# Patient Record
Sex: MALE | Race: WHITE | NOT HISPANIC OR LATINO | Employment: FULL TIME | URBAN - METROPOLITAN AREA
[De-identification: names, ages, dates, MRNs, and addresses within clinical notes are randomized per-mention and may not be internally consistent; named-entity substitution may affect disease eponyms.]

---

## 2017-07-01 ENCOUNTER — HOSPITAL ENCOUNTER (EMERGENCY)
Facility: HOSPITAL | Age: 42
Discharge: HOME | End: 2017-07-01
Attending: EMERGENCY MEDICINE | Admitting: EMERGENCY MEDICINE
Payer: COMMERCIAL

## 2017-07-01 VITALS
WEIGHT: 247.6 LBS | SYSTOLIC BLOOD PRESSURE: 155 MMHG | OXYGEN SATURATION: 97 % | BODY MASS INDEX: 33.54 KG/M2 | HEART RATE: 74 BPM | DIASTOLIC BLOOD PRESSURE: 92 MMHG | RESPIRATION RATE: 18 BRPM | TEMPERATURE: 97.8 F | HEIGHT: 72 IN

## 2017-07-01 DIAGNOSIS — M43.6 TORTICOLLIS: Primary | ICD-10-CM

## 2017-07-01 PROCEDURE — 99283 EMERGENCY DEPT VISIT LOW MDM: CPT

## 2017-07-01 RX ORDER — NAPROXEN 500 MG/1
500 TABLET ORAL 2 TIMES DAILY WITH MEALS
Qty: 20 TABLET | Refills: 0 | Status: SHIPPED | OUTPATIENT
Start: 2017-07-01 | End: 2019-09-11

## 2017-07-01 RX ORDER — DIAZEPAM 5 MG/1
5 TABLET ORAL EVERY 6 HOURS PRN
Qty: 20 TABLET | Refills: 0 | Status: SHIPPED | OUTPATIENT
Start: 2017-07-01 | End: 2019-09-11

## 2019-03-13 ENCOUNTER — HOSPITAL ENCOUNTER (EMERGENCY)
Facility: HOSPITAL | Age: 44
Discharge: HOME | End: 2019-03-13
Attending: EMERGENCY MEDICINE | Admitting: EMERGENCY MEDICINE
Payer: COMMERCIAL

## 2019-03-13 ENCOUNTER — APPOINTMENT (EMERGENCY)
Dept: RADIOLOGY | Facility: HOSPITAL | Age: 44
End: 2019-03-13
Payer: COMMERCIAL

## 2019-03-13 VITALS
BODY MASS INDEX: 33.22 KG/M2 | HEART RATE: 75 BPM | RESPIRATION RATE: 16 BRPM | OXYGEN SATURATION: 98 % | WEIGHT: 244.93 LBS | TEMPERATURE: 98 F | DIASTOLIC BLOOD PRESSURE: 102 MMHG | SYSTOLIC BLOOD PRESSURE: 142 MMHG

## 2019-03-13 DIAGNOSIS — M25.511 RIGHT SHOULDER PAIN: Primary | ICD-10-CM

## 2019-03-13 PROCEDURE — 99283 EMERGENCY DEPT VISIT LOW MDM: CPT

## 2019-03-13 PROCEDURE — 73030 X-RAY EXAM OF SHOULDER: CPT

## 2019-03-13 RX ORDER — NAPROXEN 250 MG/1
500 TABLET ORAL ONCE
Status: COMPLETED | OUTPATIENT
Start: 2019-03-13 | End: 2019-03-13

## 2019-03-13 RX ORDER — DIAZEPAM 5 MG/1
5 TABLET ORAL EVERY 12 HOURS PRN
Qty: 12 TABLET | Refills: 0 | Status: SHIPPED | OUTPATIENT
Start: 2019-03-13 | End: 2019-04-30

## 2019-03-13 RX ORDER — NAPROXEN 500 MG/1
500 TABLET ORAL 2 TIMES DAILY WITH MEALS
Qty: 20 TABLET | Refills: 0 | Status: SHIPPED | OUTPATIENT
Start: 2019-03-13 | End: 2019-04-30

## 2019-03-13 RX ORDER — PREDNISONE 20 MG/1
40 TABLET ORAL DAILY
Qty: 10 TABLET | Refills: 0 | Status: SHIPPED | OUTPATIENT
Start: 2019-03-13 | End: 2019-03-18

## 2019-03-13 RX ORDER — PREDNISONE 20 MG/1
40 TABLET ORAL ONCE
Status: COMPLETED | OUTPATIENT
Start: 2019-03-13 | End: 2019-03-13

## 2019-03-13 RX ADMIN — PREDNISONE 40 MG: 20 TABLET ORAL at 21:49

## 2019-03-13 RX ADMIN — NAPROXEN 500 MG: 250 TABLET ORAL at 21:49

## 2019-03-14 NOTE — DISCHARGE INSTRUCTIONS
Please return if you worsening or other concerning symptoms otherwise follow up with Orthopedics for re-evaluation as instructed as discussed

## 2019-03-14 NOTE — ED PROVIDER NOTES
History  Chief Complaint   Patient presents with    Shoulder Pain     Patient complaining of pain in right shoulder  Patient reports that he notices it more at night and with certain movements  26-year-old male right-hand dominant out past medical history here for evaluation of right shoulder pain  The patient states that for least the last 6 months without definite inciting incident or injury he has had pain in his right anterior lateral shoulder, it has been persistent since that time although getting progressively worse now to the point where he has difficulty sleeping its most severe in the morning  It is localized to the right anterior lateral shoulder it is worse with lying on that side movement specifically trying to lift his arm above his head and reach out words is better held in a position of comfort not moving it he has not taken anything for this she denies radiation into his chest neck or back or down his arm he has no exertional symptoms he has no weakness paresthesias or anesthesia no fevers chills warmth swelling other joint involved or other associated symptoms he has never previously been evaluated for it  No definite inciting factors or precipitating event  Complete review systems otherwise negative as noted          Prior to Admission Medications   Prescriptions Last Dose Informant Patient Reported? Taking?   diazepam (VALIUM) 5 mg tablet   No No   Sig: Take 1 tablet by mouth every 6 (six) hours as needed for muscle spasms   naproxen (NAPROSYN) 500 mg tablet   No No   Sig: Take 1 tablet by mouth 2 (two) times a day with meals Prn pain      Facility-Administered Medications: None       Past Medical History:   Diagnosis Date    GERD (gastroesophageal reflux disease)     Hypertension        Past Surgical History:   Procedure Laterality Date    KNEE SURGERY      torn ligament        History reviewed  No pertinent family history    I have reviewed and agree with the history as documented  Social History     Tobacco Use    Smoking status: Former Smoker    Smokeless tobacco: Never Used    Tobacco comment: quit almost 3 years ago  Substance Use Topics    Alcohol use: Yes     Comment: occasional    Drug use: No        Review of Systems   Constitutional: Negative for activity change, appetite change, chills and fever  Respiratory: Negative for cough and shortness of breath  Cardiovascular: Negative for chest pain and palpitations  Gastrointestinal: Negative for abdominal pain, nausea and vomiting  Musculoskeletal: Negative for arthralgias, back pain, joint swelling, myalgias, neck pain and neck stiffness  Right shoulder pain   Skin: Negative for color change, rash and wound  Neurological: Negative for dizziness, weakness, light-headedness, numbness and headaches  Hematological: Negative for adenopathy  Does not bruise/bleed easily  Psychiatric/Behavioral: Negative for agitation and behavioral problems  All other systems reviewed and are negative  Physical Exam  Physical Exam   Constitutional: He is oriented to person, place, and time  He appears well-developed and well-nourished  No distress  Very well pleasant no acute distress   HENT:   Head: Normocephalic and atraumatic  Eyes: Pupils are equal, round, and reactive to light  EOM are normal    Neck: Normal range of motion  Neck supple  No tracheal deviation present  No midline cervical or thoracic tenderness   Cardiovascular: Normal rate, regular rhythm and normal heart sounds  Exam reveals no gallop and no friction rub  No murmur heard  Pulmonary/Chest: Effort normal and breath sounds normal  He has no wheezes  He has no rales  Abdominal: Soft  Bowel sounds are normal  He exhibits no distension  There is no tenderness  There is no rebound and no guarding     Musculoskeletal:   Patient localizes discomfort to his right anterior lateral shoulder he is mildly tender in this area but there is no discrete bony tenderness of the scapula clavicle AC joint humeral head or proximal humerus he has no bony tenderness of the elbow forearm hand or wrist distal arms neurovascularly intact globally including ain PI and wrist flexion and extension he has 2+ symmetric pulses sensations intact in all 3 distributions or hands warm and well perfused and muscle strength is 5/5 globally distally there is no overlying warmth swelling or erythema forced external rotation and abduction reproduces symptoms exactly he has difficulty abducting his arm secondary to the pain   Neurological: He is alert and oriented to person, place, and time  No cranial nerve deficit  He exhibits normal muscle tone  Coordination normal    Skin: Skin is warm and dry  No rash noted  Psychiatric: He has a normal mood and affect  His behavior is normal    Nursing note and vitals reviewed        Vital Signs  ED Triage Vitals [03/13/19 1939]   Temperature Pulse Respirations Blood Pressure SpO2   98 °F (36 7 °C) 65 18 135/88 99 %      Temp Source Heart Rate Source Patient Position - Orthostatic VS BP Location FiO2 (%)   Oral Monitor Sitting Left arm --      Pain Score       9           Vitals:    03/13/19 1939 03/13/19 2226   BP: 135/88 (!) 142/102   Pulse: 65 75   Patient Position - Orthostatic VS: Sitting Sitting       qSOFA     Row Name 03/13/19 2226 03/13/19 2127 03/13/19 1939          Altered mental status GCS < 15  --  0  --      Respiratory Rate > / =22  0  --  0      Systolic BP < / =116  0  --  0      Q Sofa Score  0  0  0            Visual Acuity      ED Medications  Medications   naproxen (NAPROSYN) tablet 500 mg (500 mg Oral Given 3/13/19 2149)   predniSONE tablet 40 mg (40 mg Oral Given 3/13/19 2149)       Diagnostic Studies  Results Reviewed     None                 XR shoulder 2+ views RIGHT   ED Interpretation by Kasi Panchal DO (03/13 2229)   No acute abnormality      Final Result by Amy Brock MD (03/14 1004)      No acute osseous abnormality  Workstation performed: [id number]                    Procedures  Procedures       Phone Contacts  ED Phone Contact    ED Course                               MDM  Number of Diagnoses or Management Options  Right shoulder pain:   Diagnosis management comments: 26-year-old male right-hand dominant out past medical history with 6 months of atraumatic right shoulder pain progressive difficulty sleeping on that side does not radiate into the neck back chest or down his arm it is not exertional is strictly present with position changes and particularly external rotation and abduction comfortable in position of comfort no weakness paresthesias or anesthesia on exam here he is afebrile normal vital signs clinically well-appearing his exam is otherwise unremarkable x-ray reviewed without pathologic bony lesion calcific tendinitis significant arthritis or other acute findings history and physical exam is most consistent with rotator cuff tendinitis/pathology, although will treat symptomatically, refer Sports Medicine/Orthopedics with appropriate return precautions, patient agreeable plan      Disposition  Final diagnoses:   Right shoulder pain     Time reflects when diagnosis was documented in both MDM as applicable and the Disposition within this note     Time User Action Codes Description Comment    3/13/2019 10:29 PM Adrian Peralta Add [[id number] [id number]] Right shoulder pain       ED Disposition     ED Disposition Condition Date/Time Comment    Discharge Stable Wed Mar 13, 2019 10:29 PM [facility] discharge to home/self care              Follow-up Information     Follow up With Specialties Details Why Contact Info Additional Information    [facility] Emergency Department Emergency Medicine  If symptoms worsen [facility] [address] ED, [address] [place], [place], [address] [place] [address], [address] [place] [address] In 1 week  [address] [facility]  [phone]       [facility] Orthopedic Surgery In 1 week  [address] [place] [facility] [place], [address] [address] [place], [place], [address]          Discharge Medication List as of 3/13/2019 10:46 PM      START taking these medications    Details   !! diazepam (VALIUM) 5 mg tablet Take 1 tablet (5 mg total) by mouth every 12 (twelve) hours as needed for sleep or muscle spasms for up to 6 days, Starting Wed 3/13/2019, Until Tue 3/19/2019, Print      !! naproxen (NAPROSYN) 500 mg tablet Take 1 tablet (500 mg total) by mouth 2 (two) times a day with meals for 10 days, Starting Wed 3/13/2019, Until Sat 3/23/2019, Print      predniSONE 20 mg tablet Take 2 tablets (40 mg total) by mouth daily for 5 days, Starting Wed 3/13/2019, Until Mon 3/18/2019, Print       !! - Potential duplicate medications found  Please discuss with provider  CONTINUE these medications which have NOT CHANGED    Details   !! diazepam (VALIUM) 5 mg tablet Take 1 tablet by mouth every 6 (six) hours as needed for muscle spasms, Starting Sat 7/1/2017, Print      !! naproxen (NAPROSYN) 500 mg tablet Take 1 tablet by mouth 2 (two) times a day with meals Prn pain, Starting Sat 7/1/2017, Print       !! - Potential duplicate medications found  Please discuss with provider  No discharge procedures on file      ED Provider  Electronically Signed by           Kasi Panchal DO  03/15/19 0558

## 2019-04-30 VITALS
BODY MASS INDEX: 33.1 KG/M2 | WEIGHT: 244.4 LBS | HEART RATE: 80 BPM | HEIGHT: 72 IN | DIASTOLIC BLOOD PRESSURE: 82 MMHG | SYSTOLIC BLOOD PRESSURE: 127 MMHG

## 2019-04-30 DIAGNOSIS — M54.12 CERVICAL RADICULOPATHY AT C7: Primary | ICD-10-CM

## 2019-04-30 PROCEDURE — 99203 OFFICE O/P NEW LOW 30 MIN: CPT | Performed by: ORTHOPAEDIC SURGERY

## 2019-04-30 RX ORDER — OMEPRAZOLE 40 MG/1
40 CAPSULE, DELAYED RELEASE ORAL DAILY
COMMUNITY
Start: 2019-03-13

## 2019-04-30 RX ORDER — MELOXICAM 15 MG/1
15 TABLET ORAL DAILY
Qty: 30 TABLET | Refills: 0 | Status: SHIPPED | OUTPATIENT
Start: 2019-04-30 | End: 2019-09-11

## 2019-05-15 ENCOUNTER — HOSPITAL ENCOUNTER (OUTPATIENT)
Dept: MRI IMAGING | Facility: HOSPITAL | Age: 44
Discharge: HOME | End: 2019-05-15
Attending: ORTHOPAEDIC SURGERY
Payer: COMMERCIAL

## 2019-05-15 DIAGNOSIS — M54.12 CERVICAL RADICULOPATHY AT C7: ICD-10-CM

## 2019-05-15 PROCEDURE — 72141 MRI NECK SPINE W/O DYE: CPT

## 2019-05-29 ENCOUNTER — OFFICE VISIT (OUTPATIENT)
Dept: OBGYN CLINIC | Facility: CLINIC | Age: 44
End: 2019-05-29
Payer: COMMERCIAL

## 2019-05-29 VITALS
WEIGHT: 248.6 LBS | SYSTOLIC BLOOD PRESSURE: 139 MMHG | HEIGHT: 72 IN | HEART RATE: 82 BPM | BODY MASS INDEX: 33.67 KG/M2 | DIASTOLIC BLOOD PRESSURE: 81 MMHG

## 2019-05-29 DIAGNOSIS — M77.8 TENDINITIS OF RIGHT SHOULDER: Primary | ICD-10-CM

## 2019-05-29 PROCEDURE — 99213 OFFICE O/P EST LOW 20 MIN: CPT | Performed by: ORTHOPAEDIC SURGERY

## 2019-05-29 PROCEDURE — 20610 DRAIN/INJ JOINT/BURSA W/O US: CPT | Performed by: ORTHOPAEDIC SURGERY

## 2019-05-29 RX ADMIN — BUPIVACAINE HYDROCHLORIDE 2.5 ML: 7.5 INJECTION, SOLUTION EPIDURAL; RETROBULBAR at 18:15

## 2019-05-29 RX ADMIN — LIDOCAINE HYDROCHLORIDE 2 ML: 10 INJECTION, SOLUTION INFILTRATION; PERINEURAL at 18:15

## 2019-05-30 RX ORDER — BUPIVACAINE HYDROCHLORIDE 7.5 MG/ML
2.5 INJECTION, SOLUTION EPIDURAL; RETROBULBAR
Status: COMPLETED | OUTPATIENT
Start: 2019-05-29 | End: 2019-05-29

## 2019-05-30 RX ORDER — LIDOCAINE HYDROCHLORIDE 10 MG/ML
2 INJECTION, SOLUTION INFILTRATION; PERINEURAL
Status: COMPLETED | OUTPATIENT
Start: 2019-05-29 | End: 2019-05-29

## 2019-07-24 ENCOUNTER — OFFICE VISIT (OUTPATIENT)
Dept: OBGYN CLINIC | Facility: CLINIC | Age: 44
End: 2019-07-24
Payer: COMMERCIAL

## 2019-07-24 VITALS
HEART RATE: 69 BPM | DIASTOLIC BLOOD PRESSURE: 90 MMHG | WEIGHT: 247.6 LBS | HEIGHT: 72 IN | BODY MASS INDEX: 33.54 KG/M2 | SYSTOLIC BLOOD PRESSURE: 145 MMHG

## 2019-07-24 DIAGNOSIS — M77.8 TENDINITIS OF RIGHT SHOULDER: Primary | ICD-10-CM

## 2019-07-24 PROCEDURE — 99213 OFFICE O/P EST LOW 20 MIN: CPT | Performed by: PHYSICIAN ASSISTANT

## 2019-07-24 NOTE — PROGRESS NOTES
Chief Complaint   Patient presents with    Right Shoulder - Follow-up         Subjective   Patient here for follow-up right shoulder  Patient had cortisone injection last visit which lasted for about 48 hr   Pain returned to the right shoulder  Patient has been doing home exercise program with the   Shoulder feels better while doing the exercises but soon after pain returned  He has full range of motion but has difficulty with overhead activities  Pain with lifting items  Also the pain is over the anterior and posterior aspect of the shoulder  ROS:  Review of systems form reviewed July 24, 2019   General: no fever, no chills  Respiratory:  No coughing, shortness of breath or wheezing  Cardiovascular:  No chest pain, no palpitations  Musculoskeletal: see HPI and PE  SKIN:  No skin rash, no dry skin  Neurological:  No headaches, no confusion  Psychiatric:  No suicide thoughts, no anxiety, no depression  Review of all other systems is negative    Past Medical History:   Diagnosis Date    GERD (gastroesophageal reflux disease)     Hypertension        Current Outpatient Medications on File Prior to Visit   Medication Sig Dispense Refill    diazepam (VALIUM) 5 mg tablet Take 1 tablet by mouth every 6 (six) hours as needed for muscle spasms 20 tablet 0    meloxicam (MOBIC) 15 mg tablet Take 1 tablet (15 mg total) by mouth daily 30 tablet 0    naproxen (NAPROSYN) 500 mg tablet Take 1 tablet by mouth 2 (two) times a day with meals Prn pain 20 tablet 0    omeprazole (PRILOSEC) 40 MG capsule Take 40 mg by mouth daily       No current facility-administered medications on file prior to visit          No Known Allergies      Physical Exam:    Vitals:    07/24/19 1653   BP: 145/90   Pulse: 69   Weight: 112 kg (247 lb 9 6 oz)   Height: 6' (1 829 m)       General Appearance:  Alert, cooperative, no distress, appears stated age   Lungs:   respirations unlabored   Heart:  Normal heart rate noted   Abdomen: Soft, non-tender,  no masses   Extremities: Extremities normal, atraumatic, no cyanosis or edema   Pulses: 2+ and symmetric   Skin: Skin color, texture, turgor normal, no rashes or lesions   Neurologic: Normal         Ortho Exam  Examination of right shoulder skin intact with no visible deformities  Patient has full active range of motion all planes  Pain with resisted elevation  Positive empty can, positive John impingement sign  Negative speed's, negative drop-arm  Sensation is intact light touch    ASSESSMENT:    Hemantor Stephan was seen today for follow-up  Diagnoses and all orders for this visit:    Tendinitis of right shoulder  -     MRI shoulder right wo contrast; Future          PLAN:  Patient with clinical signs of right shoulder supraspinatus tear  He has failed conservative treatment of therapy, medications and injections  We will get MRI of the right shoulder to rule out rotator cuff tear  Patient will follow up after MRI to discuss MRI findings and treatment options based on those findings    MRI necessary for possible surgical planning

## 2019-08-01 ENCOUNTER — HOSPITAL ENCOUNTER (OUTPATIENT)
Dept: MRI IMAGING | Facility: HOSPITAL | Age: 44
Discharge: HOME | End: 2019-08-01
Payer: COMMERCIAL

## 2019-08-01 DIAGNOSIS — M77.8 TENDINITIS OF RIGHT SHOULDER: ICD-10-CM

## 2019-08-01 PROCEDURE — 73221 MRI JOINT UPR EXTREM W/O DYE: CPT

## 2019-09-11 ENCOUNTER — OFFICE VISIT (OUTPATIENT)
Dept: OBGYN CLINIC | Facility: CLINIC | Age: 44
End: 2019-09-11
Payer: COMMERCIAL

## 2019-09-11 VITALS
SYSTOLIC BLOOD PRESSURE: 127 MMHG | HEART RATE: 68 BPM | WEIGHT: 242.4 LBS | BODY MASS INDEX: 32.83 KG/M2 | DIASTOLIC BLOOD PRESSURE: 86 MMHG | HEIGHT: 72 IN

## 2019-09-11 DIAGNOSIS — M75.111 NONTRAUMATIC INCOMPLETE TEAR OF RIGHT ROTATOR CUFF: Primary | ICD-10-CM

## 2019-09-11 PROCEDURE — 99213 OFFICE O/P EST LOW 20 MIN: CPT | Performed by: ORTHOPAEDIC SURGERY

## 2019-09-11 NOTE — PROGRESS NOTES
SUBJECTIVE  Pt is a 37year old make here for a follow up to review MRI of the right shoulder  Pt has been having pain greater than 6 months that was only temporarily relieved by a CSI administered  Pt denies right shoulder injury  Pt states that he continues to have pain in his right shoulder that is worse in the morning and at bed time  Pt states that he is not currently taking anything for pain  ROS:   General: No fever, no chills, no weight loss, no weight gain  HEENT:  No loss of hearing, no nose bleeds, no sore throat  Eyes:  No eye pain, no red eyes, no visual disturbance  Respiratory:  No cough, no shortness of breath, no wheezing  Cardiovascular:  No chest pain, no palpitations, no edema  GI: No abdominal pain, no nausea, no vomiting  Endocrine: No frequent urination, no excessive thirst  Urinary:  No dysuria, no hematuria, no incontinence  Musculoskeletal: see HPI and PE  Skin:  No rash, no wounds  Neurological:  No dizziness, no headache, no numbness  Psychiatric:  No difficulty concentrating, no depression, no suicide thoughts, no anxiety  Review of all other systems is negative    PMH:  Past Medical History:   Diagnosis Date    GERD (gastroesophageal reflux disease)     Hypertension        PSH:  Past Surgical History:   Procedure Laterality Date    KNEE SURGERY Bilateral     torn ligament        Medications:  Current Outpatient Medications   Medication Sig Dispense Refill    omeprazole (PRILOSEC) 40 MG capsule Take 40 mg by mouth daily      meloxicam (MOBIC) 15 mg tablet Take 1 tablet (15 mg total) by mouth daily 30 tablet 0     No current facility-administered medications for this visit          Allergies:  No Known Allergies    Family History:  Family History   Problem Relation Age of Onset    No Known Problems Mother     SELINA disease Father        Social History:  Social History     Occupational History    Not on file   Tobacco Use    Smoking status: Former Smoker    Smokeless tobacco: Never Used    Tobacco comment: quit almost 3 years ago  Substance and Sexual Activity    Alcohol use: Yes     Comment: occasional    Drug use: No    Sexual activity: Not on file       Physical Exam:  General :  Alert, cooperative, no distress, appears stated age  Blood pressure 127/86, pulse 68, height 6' (1 829 m), weight 110 kg (242 lb 6 4 oz)  Head:  Normocephalic, without obvious abnormality, atraumatic   Eyes:  Conjunctiva/corneas clear, EOM's intact,   Ears: Both ears normal appearance, no hearing deficits  Nose: Nares normal, septum midline, no drainage    Neck: Supple,  trachea midline, no adenopathy, no tenderness, no mass   Back:   Symmetric, no curvature, ROM normal, no tenderness   Lungs:   Respirations unlabored   Chest Wall:  No tenderness or deformity   Extremities: Extremities normal, atraumatic, no cyanosis or edema      Pulses: 2+ and symmetric   Skin: Skin color, texture, turgor normal, no rashes or lesions      Neurologic: Normal           Right Shoulder Exam     Range of Motion   The patient has normal right shoulder ROM  Muscle Strength   Right shoulder normal muscle strength: Pain with resisted abduction  Abduction: 4/5   Internal rotation: 5/5   External rotation: 5/5     Tests   John test: positive  Impingement: negative    Comments:  5/5 wrist strength against resistance             Imaging Studies: The following imaging studies were reviewed in office today  My findings are noted  MRI of the right shoulder performed 8/1/19 shows partial thickness tear of the anterior leading edge in the supraspinatus, no tendon retraction or atrophy    Assessment  Encounter Diagnosis   Name Primary?  Nontraumatic incomplete tear of right rotator cuff Yes         Plan:  MRI of the right shoulder was discussed at length with the patient  Order was placed for physical therapy  Pt was prescribed 15 mg Mobic   Pt will follow up in the office in 6 weeks after finishing formal therapy for reevaluation         Scribe Attestation    I,:   Casimiro Nicole am acting as a scribe while in the presence of the attending physician :        I,:   Thomas rAevalo MD personally performed the services described in this documentation    as scribed in my presence :

## 2019-09-12 RX ORDER — MELOXICAM 15 MG/1
15 TABLET ORAL DAILY
Qty: 30 TABLET | Refills: 0 | Status: SHIPPED | OUTPATIENT
Start: 2019-09-12 | End: 2022-03-14

## 2019-09-23 ENCOUNTER — EVALUATION (OUTPATIENT)
Dept: PHYSICAL THERAPY | Facility: MEDICAL CENTER | Age: 44
End: 2019-09-23
Payer: COMMERCIAL

## 2019-09-23 DIAGNOSIS — M75.111 NONTRAUMATIC INCOMPLETE TEAR OF RIGHT ROTATOR CUFF: Primary | ICD-10-CM

## 2019-09-23 PROCEDURE — 97161 PT EVAL LOW COMPLEX 20 MIN: CPT | Performed by: PHYSICAL THERAPIST

## 2019-09-23 PROCEDURE — 97112 NEUROMUSCULAR REEDUCATION: CPT | Performed by: PHYSICAL THERAPIST

## 2019-09-23 PROCEDURE — 97110 THERAPEUTIC EXERCISES: CPT | Performed by: PHYSICAL THERAPIST

## 2019-09-23 PROCEDURE — G8990 OTHER PT/OT CURRENT STATUS: HCPCS | Performed by: PHYSICAL THERAPIST

## 2019-09-23 PROCEDURE — G8991 OTHER PT/OT GOAL STATUS: HCPCS | Performed by: PHYSICAL THERAPIST

## 2019-09-23 NOTE — PROGRESS NOTES
PT Evaluation     Today's date: 2019  Patient name: Cesar Melton  : 1975  MRN: [id number]  Referring provider: Ariella Blackman MD  Dx:   Encounter Diagnosis     ICD-10-CM    1  Nontraumatic incomplete tear of right rotator cuff M75 111 Ambulatory referral to Physical Therapy                  Assessment  Assessment details: Pt is an alert and oriented 38 yo male, referred to out-pt PT with dx of supraspinatus tear  Pt states onset of shoulder pain starting approx 4-6 mo ago with insidious onset when he got OOB  Pt states pain is in R post shoulder, and also sup shoulder  Pt has x-rays and MRI conformed supraspinatus tear  Pt has injection in post shoulder which only gave him 2-3 days of relief  Pt states he works out at [employer] and feels pain initially but the more he works out the better he feels  Then he states he wakes up the next day and pain has returned  Upon examination, pt demonstrates (+) pain and tissue tenderness, decreased R shoulder strength, (+) subscap and supraspinatus testing, and postural deficits  Pt will benefit from skilled PT to address the deficits listed above  Thank you for your referral   Impairments: abnormal or restricted ROM, activity intolerance, impaired physical strength, lacks appropriate home exercise program, pain with function and poor posture   Understanding of Dx/Px/POC: good   Prognosis: good    Goals  ST: Decrease pain by 25% in 4 weeks with all functional activities  2: Improved ROM by 25% in 4 weeks to assist with all functional activities  3: Improve strength by 1/2 grade in 4 weeks to assist with all functional activities  LT:  Decrease pain to 0-1/10 with all functional activities in 4-6 weeks  2: Improved ROM to WFL's in 4-6 weeks to assist with all functional activities  3:  Increase strength to WFL's in 4-6 weeks to assist with all functional activities    Plan  Patient would benefit from: skilled physical therapy  Planned modality interventions: cryotherapy  Planned therapy interventions: manual therapy, joint mobilization, neuromuscular re-education, patient education, postural training, therapeutic exercise, therapeutic activities, stretching, strengthening, therapeutic training and home exercise program  Frequency: 2x week  Duration in visits: 12  Duration in weeks: 8  Plan of Care beginning date: 2019  Plan of Care expiration date: 2019  Treatment plan discussed with: patient        Subjective Evaluation    History of Present Illness  Mechanism of injury: Function:  Pt works out at [facility], and states he has pain initially but subsides with upper body wt lifting  Pt states most pain with reaching behind his back, and resolves with reaching his arm overhead  Pt works as a salesman, 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  Pain  Current pain ratin  At best pain ratin  At worst pain rating: 10  Quality: dull ache, tight and squeezing  Relieving factors: heat and medications    Hand dominance: right      Diagnostic Tests  X-ray: normal  MRI studies: abnormal  Treatments  Current treatment: physical therapy  Patient Goals  Patient goals for therapy: decreased pain, return to sport/leisure activities, increased strength and increased motion          Objective     Observations     Additional Observation Details  Posture/observation:  Mild forward head posture, decreased cervical lordosis, b/l scap protraction in sitting    Palpation   Left   No palpable tenderness to the biceps, infraspinatus, supraspinatus, teres major and triceps  Tenderness of the rhomboids and serratus anterior       Neurological Testing     Sensation     Shoulder   Left Shoulder   Intact: light touch    Right Shoulder   Intact: light touch    Reflexes     Right   Biceps (C5/C6): normal (2+)  Brachioradialis (C6): normal (2+)  Triceps (C7): trace (1+)    Additional Neurological Details  Pt does report intermittent numbness into R tricep area and into R hand (all digits)  Active Range of Motion   Left Shoulder   Normal active range of motion    Strength/Myotome Testing     Left Shoulder     Planes of Motion   Flexion: 3+   Abduction: 3+   External rotation at 0°: 4-   Internal rotation at 0°: 4+     Isolated Muscles   Biceps: 5     Tests     Left Shoulder   Positive empty can and Hawkin's  Negative lift-off         Flowsheet Rows      Most Recent Value   PT/OT G-Codes   Current Score  57   Projected Score  75             Precautions: GERD, HTN      Manual  9/23            MFR R periscap region nv                                                                    Exercise Diary  9/23            Postural retraining BG            TB ext Black 5" x20            TB retract Black 5" x20            UBE retro nv                                                                                                                                                                                                                                Modalities  9/23            cp prn

## 2019-09-30 ENCOUNTER — OFFICE VISIT (OUTPATIENT)
Dept: PHYSICAL THERAPY | Facility: MEDICAL CENTER | Age: 44
End: 2019-09-30
Payer: COMMERCIAL

## 2019-09-30 DIAGNOSIS — M75.111 NONTRAUMATIC INCOMPLETE TEAR OF RIGHT ROTATOR CUFF: Primary | ICD-10-CM

## 2019-09-30 PROCEDURE — 97110 THERAPEUTIC EXERCISES: CPT | Performed by: PHYSICAL THERAPIST

## 2019-09-30 PROCEDURE — 97140 MANUAL THERAPY 1/> REGIONS: CPT | Performed by: PHYSICAL THERAPIST

## 2019-09-30 PROCEDURE — 97112 NEUROMUSCULAR REEDUCATION: CPT | Performed by: PHYSICAL THERAPIST

## 2019-09-30 NOTE — PROGRESS NOTES
Daily Note     Today's date: 2019  Patient name: Manas Garland  : 1975  MRN: [id number]  Referring provider: Santos Carvajal MD  Dx:   Encounter Diagnosis     ICD-10-CM    1  Nontraumatic incomplete tear of right rotator cuff M75 111                   Subjective: Pt states he was able to perform HEP 2x since IE without any increase in shoulder pain  Objective: See treatment diary below      Assessment: Tolerated treatment well  Patient exhibited good technique with therapeutic exercises and would benefit from continued PT  Pt reported feeling a good hurt with MFR to R shoulder blade region  Progressed TE as charted  Plan: Continue per plan of care        Precautions: GERD, HTN      Manual             MFR R periscap region nv x10'                                                                   Exercise Diary             Postural retraining BG            TB ext Black 5" x20 Black 5"x20           TB retract Black 5" x20 Black 5"x20           UBE retro nv x5'           Prone ext  2#           Probe abd  10"x10           S l ER  2#5"x20           S/l abd  2#                                                                                                                                                                           Modalities             cp prn            MHP  x10'

## 2019-10-08 ENCOUNTER — OFFICE VISIT (OUTPATIENT)
Dept: PHYSICAL THERAPY | Facility: MEDICAL CENTER | Age: 44
End: 2019-10-08
Payer: COMMERCIAL

## 2019-10-08 DIAGNOSIS — M75.111 NONTRAUMATIC INCOMPLETE TEAR OF RIGHT ROTATOR CUFF: Primary | ICD-10-CM

## 2019-10-08 PROCEDURE — 97140 MANUAL THERAPY 1/> REGIONS: CPT

## 2019-10-08 PROCEDURE — 97112 NEUROMUSCULAR REEDUCATION: CPT

## 2019-10-08 NOTE — PROGRESS NOTES
Daily Note     Today's date: 10/8/2019  Patient name: Priti Jones  : 1975  MRN: [id number]  Referring provider: Moe Sheth MD  Dx:   Encounter Diagnosis     ICD-10-CM    1  Nontraumatic incomplete tear of right rotator cuff M75 111         1:1 with PTA CR 6:00- 6:30  MH 6:30-6:40  Subjective: States that scapular pain has resolved since last session however is now experiencing right UT/LS discomfort  Objective: See treatment diary below      Assessment: Tolerated treatment well  Patient exhibited good technique with therapeutic exercises and would benefit from continued PT  Significant UT compensation requiring cues for improved scap depression with retraction  Challenged with proper recruitment of mid and low traps with progressed scap stabilization  STM to UT/LS today with improved mobility and decreased pain post        Plan: Continue per plan of care        Precautions: GERD, HTN      Manual   10/8          MFR R periscap region nv x10' STM  R UT/LS  CR  10 mins                                        Exercise Diary   10/8          Postural retraining BG            TB ext Black 5" x20 Black 5"x20 Black  5"x20          TB retract Black 5" x20 Black 5"x20 Black  5"x20          UBE retro nv x5' 5 mins          Prone ext  2# 2#  2x10          Prone abd  10"x10 10"x10          S l ER  2#5"x20 2#  5" 2x10          S/l abd  2# 2#  2x10                                                                                                                                                                          Modalities   10/8          cp prn            MHP  x10' 10 mins

## 2019-10-14 ENCOUNTER — OFFICE VISIT (OUTPATIENT)
Dept: PHYSICAL THERAPY | Facility: MEDICAL CENTER | Age: 44
End: 2019-10-14
Payer: COMMERCIAL

## 2019-10-14 DIAGNOSIS — M75.111 NONTRAUMATIC INCOMPLETE TEAR OF RIGHT ROTATOR CUFF: Primary | ICD-10-CM

## 2019-10-14 PROCEDURE — 97140 MANUAL THERAPY 1/> REGIONS: CPT | Performed by: PHYSICAL THERAPIST

## 2019-10-14 PROCEDURE — 97110 THERAPEUTIC EXERCISES: CPT | Performed by: PHYSICAL THERAPIST

## 2019-10-14 PROCEDURE — 97112 NEUROMUSCULAR REEDUCATION: CPT | Performed by: PHYSICAL THERAPIST

## 2019-10-14 NOTE — PROGRESS NOTES
Daily Note     Today's date: 10/14/2019  Patient name: Timothy Martins  : 1975  MRN: [id number]  Referring provider: Carrie Baca MD  Dx:   Encounter Diagnosis     ICD-10-CM    1  Nontraumatic incomplete tear of right rotator cuff M75 111                  Subjective: States that he feels relief of pain for a couple days post tx, however states pain returns  Pt states deep tissue work cont to offer biggest relief of pain at this time  Objective: See treatment diary below      Assessment: Tolerated treatment well  Patient exhibited good technique with therapeutic exercises and would benefit from continued PT  Initiated thoracic CPA mobs, without any reproduction of scapular pain sx's  Pt reports reproduction of pain with addition of snow angels on foam roll  Pt also states relief of pain with levator stretch  Plan: Continue per plan of care        Precautions: GERD, HTN      Manual  9/23 9/30 10/8 10/14         MFR R periscap region nv x10' STM  R UT/LS  CR  10 mins x12'                                       Exercise Diary  9/23 9/30 10/8 10/14         Postural retraining BG            TB ext Black 5" x20 Black 5"x20 Black  5"x20 Black 5"x20         TB retract Black 5" x20 Black 5"x20 Black  5"x20 Black 5"x20         UBE retro nv x5' 5 mins x5'         Prone ext  2# 2#  2x10 2#2x10         Prone abd  10"x10 10"x10 10"x10         S l ER  2#5"x20 2#  5" 2x10          S/l abd  2# 2#  2x10          Levator stretch    20"x4         Foam roll series    x10ea                                                                                                                                               Modalities  9/23 9/30 10/8 10/14         cp prn            MHP  x10' 10 mins x10'

## 2019-10-21 ENCOUNTER — OFFICE VISIT (OUTPATIENT)
Dept: PHYSICAL THERAPY | Facility: MEDICAL CENTER | Age: 44
End: 2019-10-21
Payer: COMMERCIAL

## 2019-10-21 DIAGNOSIS — M75.111 NONTRAUMATIC INCOMPLETE TEAR OF RIGHT ROTATOR CUFF: Primary | ICD-10-CM

## 2019-10-21 PROCEDURE — G8984 CARRY CURRENT STATUS: HCPCS | Performed by: PHYSICAL THERAPIST

## 2019-10-21 PROCEDURE — 97110 THERAPEUTIC EXERCISES: CPT | Performed by: PHYSICAL THERAPIST

## 2019-10-21 PROCEDURE — 97140 MANUAL THERAPY 1/> REGIONS: CPT | Performed by: PHYSICAL THERAPIST

## 2019-10-21 PROCEDURE — G8985 CARRY GOAL STATUS: HCPCS | Performed by: PHYSICAL THERAPIST

## 2019-10-21 PROCEDURE — 97112 NEUROMUSCULAR REEDUCATION: CPT | Performed by: PHYSICAL THERAPIST

## 2019-10-21 NOTE — PROGRESS NOTES
Daily Note     Today's date: 10/21/2019  Patient name: Anh Dias  : 1975  MRN: [id number]  Referring provider: Марина Juarez MD  Dx:   Encounter Diagnosis     ICD-10-CM    1  Nontraumatic incomplete tear of right rotator cuff M75 111                  Subjective: States that he cont to feel sup shoulder pain that radiated into R lat upper arm  Pt states pain mostly reproduced by abd ROM  Objective: See treatment diary below      Assessment: Tolerated treatment well  Patient exhibited good technique with therapeutic exercises and would benefit from continued PT  Plan: Continue per plan of care        Precautions: GERD, HTN      Manual  9/23 9/30 10/8 10/14 10/21        MFR R periscap region nv x10' STM  R UT/LS  CR  10 mins x12' x2'        Thoracic cpa mobs Grade III     x4'        R tammi rib mobs     x4'            Exercise Diary  9/23 9/30 10/8 10/14 10/21        Postural retraining BG            TB ext Black 5" x20 Black 5"x20 Black  5"x20 Black 5"x20 BTB 5"x20        TB retract Black 5" x20 Black 5"x20 Black  5"x20 Black 5"x20 BTB 5"x20        UBE retro nv x5' 5 mins x5' x4'        Prone ext  2# 2#  2x10 2#2x10 2# x20        Prone abd  10"x10 10"x10 10"x10 NP        S l ER  2#5"x20 2#  5" 2x10  2# 5"x20        S/l abd  2# 2#  2x10  2# x20        Levator stretch    20"x4         Foam roll series    x10ea x10ea                                                                                                                                              Modalities  9/23 9/30 10/8 10/14 10/21        cp prn            MHP  x10' 10 mins x10' x10'

## 2019-10-28 ENCOUNTER — EVALUATION (OUTPATIENT)
Dept: PHYSICAL THERAPY | Facility: MEDICAL CENTER | Age: 44
End: 2019-10-28
Payer: COMMERCIAL

## 2019-10-28 DIAGNOSIS — M75.111 NONTRAUMATIC INCOMPLETE TEAR OF RIGHT ROTATOR CUFF: Primary | ICD-10-CM

## 2019-10-28 PROCEDURE — 97140 MANUAL THERAPY 1/> REGIONS: CPT | Performed by: PHYSICAL THERAPIST

## 2019-10-28 PROCEDURE — 97110 THERAPEUTIC EXERCISES: CPT | Performed by: PHYSICAL THERAPIST

## 2019-10-28 NOTE — PROGRESS NOTES
PT Re-Evaluation     Today's date: 10/28/2019  Patient name: Jacklyn Vargas  : 1975  MRN: [id number]  Referring provider: Jeannette Brennan MD  Dx:   Encounter Diagnosis     ICD-10-CM    1  Nontraumatic incomplete tear of right rotator cuff M75 111                   Assessment  Assessment details: Pt has attended 6 visits of skilled PT over the past 5 weeks, and states his shoulder feels "miserable"  Pt states he has most pain upon waking in the morning, and states any lifting or reaching tasks  Pt states he lifted a can of tomato paste and had sig pain in his shoulder and dropped the can  Upon reassessment, pt demonstrates (+) pain and tissue tenderness, decreased R shoulder strength, (+) subscap and supraspinatus testing, and postural deficits  Please advise as to further POC of this pt  Thank you for your referral   Impairments: abnormal or restricted ROM, activity intolerance, impaired physical strength, pain with function and poor posture   Understanding of Dx/Px/POC: good   Prognosis: good    Goals  ST: Decrease pain by 25% in 4 weeks with all functional activities-not met as of 10/28  2: Improved ROM by 25% in 4 weeks to assist with all functional activities- not met as of 10/28  3: Improve strength by 1/2 grade in 4 weeks to assist with all functional activities-not met as of 10/28  LT:  Decrease pain to 0-1/10 with all functional activities in 4-6 weeks  2: Improved ROM to WFL's in 4-6 weeks to assist with all functional activities  3:  Increase strength to WFL's in 4-6 weeks to assist with all functional activities    Plan  Patient would benefit from: skilled physical therapy  Planned modality interventions: cryotherapy  Planned therapy interventions: manual therapy, joint mobilization, neuromuscular re-education, patient education, postural training, therapeutic exercise, therapeutic activities, stretching, strengthening, therapeutic training and home exercise program  Frequency: 2x week  Duration in visits: 12  Duration in weeks: 8  Plan of Care beginning date: 2019  Plan of Care expiration date: 2019  Treatment plan discussed with: patient        Subjective Evaluation    History of Present Illness  Mechanism of injury: Function:  Pt works out at [employer], and states he has pain initially but subsides with upper body wt lifting  Pt states most pain with reaching behind his back, and resolves with reaching his arm overhead  Pt works as a salesman, 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  Pain  Current pain ratin  At best pain ratin  At worst pain ratin  Quality: dull ache, tight and squeezing  Relieving factors: heat and medications    Hand dominance: right      Diagnostic Tests  X-ray: normal  MRI studies: abnormal  Treatments  Current treatment: physical therapy  Patient Goals  Patient goals for therapy: decreased pain, return to sport/leisure activities, increased strength and increased motion          Objective     Observations     Additional Observation Details  Posture/observation:  Mild forward head posture, decreased cervical lordosis, b/l scap protraction in sitting    Palpation   Left   No palpable tenderness to the biceps, infraspinatus, teres major and triceps  Tenderness of the rhomboids, serratus anterior and supraspinatus       Neurological Testing     Sensation     Shoulder   Left Shoulder   Intact: light touch    Right Shoulder   Intact: light touch    Reflexes     Right   Biceps (C5/C6): normal (2+)  Brachioradialis (C6): normal (2+)  Triceps (C7): trace (1+)    Additional Neurological Details  Pt does report intermittent sharp pain into R lat upper arm    Active Range of Motion   Left Shoulder   Normal active range of motion    Right Shoulder   Flexion: 150 degrees   Extension: 60 degrees   Abduction: 150 degrees     Strength/Myotome Testing     Left Shoulder     Planes of Motion   Internal rotation at 0°: 4+     Right Shoulder     Planes of Motion Flexion: 3+   Abduction: 3+   External rotation at 0°: 4-   Internal rotation at 0°: 4+     Tests     Left Shoulder   Negative lift-off  Right Shoulder   Positive empty can and Hawkin's  Negative lift-off and Speed's                Precautions: GERD, HTN      Manual  9/23 9/30 10/8 10/14 10/21             MFR R periscap region nv x10' STM  R UT/LS  CR  10 mins x12' x2'             Thoracic cpa mobs Grade III         x4'             R tammi rib mobs         x4'                   Exercise Diary  9/23 9/30 10/8 10/14 10/21  10/28           Postural retraining BG                     TB ext Black 5" x20 Black 5"x20 Black  5"x20 Black 5"x20 BTB 5"x20  BTB 5"x20           TB retract Black 5" x20 Black 5"x20 Black  5"x20 Black 5"x20 BTB 5"x20             UBE retro nv x5' 5 mins x5' x4'  x5'           Prone ext   2# 2#  2x10 2#2x10 2# x20             Prone abd   10"x10 10"x10 10"x10 NP             S l ER   2#5"x20 2#  5" 2x10   2# 5"x20             S/l abd   2# 2#  2x10   2# x20             Levator stretch       20"x4               Foam roll series       x10ea x10ea                                                                                                                                                                                                                                                                   Modalities  9/23 9/30 10/8 10/14 10/21             cp prn                     MHP   x10' 10 mins x10' x10'

## 2019-10-30 ENCOUNTER — OFFICE VISIT (OUTPATIENT)
Dept: OBGYN CLINIC | Facility: CLINIC | Age: 44
End: 2019-10-30
Payer: COMMERCIAL

## 2019-10-30 VITALS
BODY MASS INDEX: 32.97 KG/M2 | RESPIRATION RATE: 18 BRPM | WEIGHT: 243.4 LBS | HEART RATE: 84 BPM | HEIGHT: 72 IN | DIASTOLIC BLOOD PRESSURE: 75 MMHG | SYSTOLIC BLOOD PRESSURE: 127 MMHG

## 2019-10-30 DIAGNOSIS — M75.111 NONTRAUMATIC INCOMPLETE TEAR OF RIGHT ROTATOR CUFF: Primary | ICD-10-CM

## 2019-10-30 DIAGNOSIS — M77.8 TENDINITIS OF RIGHT SHOULDER: ICD-10-CM

## 2019-10-30 PROCEDURE — 99213 OFFICE O/P EST LOW 20 MIN: CPT | Performed by: ORTHOPAEDIC SURGERY

## 2019-10-30 NOTE — PROGRESS NOTES
Chief Complaint   Patient presents with    Right Shoulder - Pain, Follow-up         Subjective    Patient here following up right shoulder  Patient has had cortisone in the past with temporary improvement  He is attending physical therapy one time a week with some improvement  Patient does have full range of motion but still has weakness with lifting items  The pain he was having over the posterior aspect of his shoulder is now gone  Most of his pain is over the anterior and superior aspect of the shoulder  Denies any numbness or tingling upper extremity  ROS:  Review of systems form reviewed October 30, 2019   General: no fever, no chills  Respiratory:  No coughing, shortness of breath or wheezing  Cardiovascular:  No chest pain, no palpitations  Musculoskeletal: see HPI and PE  SKIN:  No skin rash, no dry skin  Neurological:  No headaches, no confusion  Psychiatric:  No suicide thoughts, no anxiety, no depression  Review of all other systems is negative    Past Medical History:   Diagnosis Date    GERD (gastroesophageal reflux disease)     Hypertension        Current Outpatient Medications on File Prior to Visit   Medication Sig Dispense Refill    meloxicam (MOBIC) 15 mg tablet Take 1 tablet (15 mg total) by mouth daily 30 tablet 0    omeprazole (PRILOSEC) 40 MG capsule Take 40 mg by mouth daily       No current facility-administered medications on file prior to visit          No Known Allergies      Physical Exam:    Vitals:    10/30/19 1004   BP: 127/75   Pulse: 84   Resp: 18   Weight: 110 kg (243 lb 6 4 oz)   Height: 6' (1 829 m)       General Appearance:  Alert, cooperative, no distress, appears stated age   Lungs:   respirations unlabored   Heart:  Normal heart rate noted   Abdomen:   Soft, non-tender,  no masses   Extremities: Extremities normal, atraumatic, no cyanosis or edema   Pulses: 2+ and symmetric   Skin: Skin color, texture, turgor normal, no rashes or lesions   Neurologic: Normal Ortho Exam   Right shoulder exam with skin intact no erythema noted  Patient with full active range of motion in all planes  Pain with resisted elevation  4/5 supraspinatus, 5/5 IR and 5/5 ER  Positive empty can, negative John impingement, negative Speed  Sensation is intact light touch    Imaging  MRI of the right shoulder was reviewed at last visit by doctor Garcia  MRI of the right shoulder shows partial-thickness tear of the anterior leading edge of the supraspinatus      ASSESSMENT:    Vani Simpson was seen today for pain and follow-up  Diagnoses and all orders for this visit:    Tendinitis of right shoulder    Nontraumatic incomplete tear of right rotator cuff  -     Ambulatory referral to Physical Therapy; Future          PLAN:  MRI evidence of partial-thickness tear of the anterior leading edge of the supraspinatus  Patient would like to hold off on surgery if possible at least until beginning of next year  He would like to continue with physical therapy    He will attend twice a week as well as do a home exercise program   Patient will follow up in 10 weeks if at that time no better we will discuss right shoulder arthroscopy with  [facility]

## 2019-11-06 ENCOUNTER — APPOINTMENT (OUTPATIENT)
Dept: PHYSICAL THERAPY | Facility: MEDICAL CENTER | Age: 44
End: 2019-11-06
Payer: COMMERCIAL

## 2019-11-11 ENCOUNTER — OFFICE VISIT (OUTPATIENT)
Dept: PHYSICAL THERAPY | Facility: MEDICAL CENTER | Age: 44
End: 2019-11-11
Payer: COMMERCIAL

## 2019-11-11 DIAGNOSIS — M75.111 NONTRAUMATIC INCOMPLETE TEAR OF RIGHT ROTATOR CUFF: Primary | ICD-10-CM

## 2019-11-11 PROCEDURE — 97110 THERAPEUTIC EXERCISES: CPT | Performed by: PHYSICAL THERAPIST

## 2019-11-11 PROCEDURE — 97112 NEUROMUSCULAR REEDUCATION: CPT | Performed by: PHYSICAL THERAPIST

## 2019-11-11 PROCEDURE — 97140 MANUAL THERAPY 1/> REGIONS: CPT | Performed by: PHYSICAL THERAPIST

## 2019-11-11 NOTE — PROGRESS NOTES
Daily Note     Today's date: 2019  Patient name: Ankit Mcdaniel  : 1975  MRN: [id number]  Referring provider: Marcelo Moeller MD  Dx:   Encounter Diagnosis     ICD-10-CM    1  Nontraumatic incomplete tear of right rotator cuff M75 111                   Subjective: Pt states he saw MD and wants to cont PT for another 2 weeks to become ind with HEP for strengthening and stretching to prepare him for surgery that will be performed after the New Year  Objective: See treatment diary below      Assessment: Tolerated treatment well  Patient exhibited good technique with therapeutic exercises and would benefit from continued PT  Mild muscle guarding present with PROM IR/ER, which pt reports pain with  Plan: Continue per plan of care  Progress treatment as tolerated         Precautions: GERD, HTN      Manual  9/23 9/30 10/8 10/14 10/21  11/11           MFR R periscap region nv x10' STM  R UT/LS  CR  10 mins x12' x2'             Thoracic cpa mobs Grade III         x4'             PROM R shouler to tolerance         x4'  x10'                 Exercise Diary  9/23 9/30 10/8 10/14 10/21  10/28  11/11         Postural retraining BG                     TB ext Black 5" x20 Black 5"x20 Black  5"x20 Black 5"x20 BTB 5"x20  BTB 5"x20           TB retract Black 5" x20 Black 5"x20 Black  5"x20 Black 5"x20 BTB 5"x20             UBE retro nv x5' 5 mins x5' x4'  x5'  x5'         Prone ext   2# 2#  2x10 2#2x10 2# x20    x20         Prone abd   10"x10 10"x10 10"x10 NP             S l ER   2#5"x20 2#  5" 2x10   2# 5"x20    5"x20         S/l abd   2# 2#  2x10   2# x20    x20         Levator stretch       20"x4               Foam roll series       x10ea x10ea              towel IR stretch             20"x4          prone retract              5"x20          wall slides             10"x10                                                                                                                                                                                       Modalities  9/23 9/30 10/8 10/14 10/21             cp prn                     MHP   x10' 10 mins x10' x10'

## 2019-11-13 ENCOUNTER — OFFICE VISIT (OUTPATIENT)
Dept: PHYSICAL THERAPY | Facility: MEDICAL CENTER | Age: 44
End: 2019-11-13
Payer: COMMERCIAL

## 2019-11-13 DIAGNOSIS — M75.111 NONTRAUMATIC INCOMPLETE TEAR OF RIGHT ROTATOR CUFF: Primary | ICD-10-CM

## 2019-11-13 PROCEDURE — 97140 MANUAL THERAPY 1/> REGIONS: CPT

## 2019-11-13 PROCEDURE — 97112 NEUROMUSCULAR REEDUCATION: CPT

## 2019-11-13 PROCEDURE — 97110 THERAPEUTIC EXERCISES: CPT

## 2019-11-13 NOTE — PROGRESS NOTES
Daily Note     Today's date: 2019  Patient name: Nataliya Mei  : 1975  MRN: [id number]  Referring provider: Basilio Owens MD  Dx:   Encounter Diagnosis     ICD-10-CM    1  Nontraumatic incomplete tear of right rotator cuff M75 111        Start Time:   Stop Time: 1810  Total time in clinic (min): 40 minutes    Subjective: Patient states, "It's been a good week  I have no pain today"  Objective: See treatment diary below    Assessment: Tolerated treatment well  Patient exhibited good technique with therapeutic exercises and would benefit from continued PT  Patient demonstrated full ROM in all cardinal planes with min irritation at end range  Good response to therapy thus far  Holding bands and weighted exercises secondary to increased pain in the past     Plan: Continue per plan of care  Progress treatment as tolerated         Precautions: GERD, HTN    Manual  9/23 9/30 10/8 10/14 10/21  11/11  11/13         MFR R periscap region nv x10' STM  R UT/LS  CR  10 mins x12' x2'             Thoracic cpa mobs Grade III         x4'             PROM R shouler to tolerance         x4'  x10'  8'            Exercise Diary  9/23 9/30 10/8 10/14 10/21  10/28  11/11  11/13       Postural retraining BG                     TB ext Black 5" x20 Black 5"x20 Black  5"x20 Black 5"x20 BTB 5"x20  BTB 5"x20           TB retract Black 5" x20 Black 5"x20 Black  5"x20 Black 5"x20 BTB 5"x20             UBE retro nv x5' 5 mins x5' x4'  x5'  x5'  5'       Prone ext   2# 2#  2x10 2#2x10 2# x20    x20  x20       Prone abd   10"x10 10"x10 10"x10 NP             S l ER   2#5"x20 2#  5" 2x10   2# 5"x20    5"x20  5"x20       S/l abd   2# 2#  2x10   2# x20    x20  x20       Levator stretch       20"x4               Foam roll series       x10ea x10ea             towel IR stretch             20"x4  20"x4        prone retract              5"x20  5"x20        wall slides             10"x10  10"x10                                                                                                                                                                                  Modalities  9/23 9/30 10/8 10/14 10/21             cp prn                     MHP   x10' 10 mins x10' x10'

## 2019-11-18 ENCOUNTER — OFFICE VISIT (OUTPATIENT)
Dept: PHYSICAL THERAPY | Facility: MEDICAL CENTER | Age: 44
End: 2019-11-18
Payer: COMMERCIAL

## 2019-11-18 DIAGNOSIS — M75.111 NONTRAUMATIC INCOMPLETE TEAR OF RIGHT ROTATOR CUFF: Primary | ICD-10-CM

## 2019-11-18 PROCEDURE — 97140 MANUAL THERAPY 1/> REGIONS: CPT | Performed by: PHYSICAL THERAPIST

## 2019-11-18 PROCEDURE — 97110 THERAPEUTIC EXERCISES: CPT | Performed by: PHYSICAL THERAPIST

## 2019-11-18 PROCEDURE — 97112 NEUROMUSCULAR REEDUCATION: CPT | Performed by: PHYSICAL THERAPIST

## 2019-11-18 NOTE — PROGRESS NOTES
Daily Note     Today's date: 2019  Patient name: Janene Hurst  : 1975  MRN: [id number]  Referring provider: Joseph Toribio MD  Dx:   Encounter Diagnosis     ICD-10-CM    1  Nontraumatic incomplete tear of right rotator cuff M75 111                   Subjective: Patient states he cont to feel better with activity and feels decreased pain with AROM ex  Objective: See treatment diary below    Assessment: Tolerated treatment well  Patient exhibited good technique with therapeutic exercises and would benefit from continued PT  Patient performed all ex in pain-free range, and cont to demonstrate improved AROM/PROM  Plan: Continue per plan of care  Progress treatment as tolerated         Precautions: GERD, HTN    Manual  9/23 9/30 10/8 10/14 10/21  11/11  11/13  11/18       MFR R periscap region nv x10' STM  R UT/LS  CR  10 mins x12' x2'             Thoracic cpa mobs Grade III         x4'             PROM R shouler to tolerance         x4'  x10'  8'  x8'          Exercise Diary  9/23 9/30 10/8 10/14 10/21  10/28  11/11  11/13  11/18     Postural retraining BG                     TB ext Black 5" x20 Black 5"x20 Black  5"x20 Black 5"x20 BTB 5"x20  BTB 5"x20           TB retract Black 5" x20 Black 5"x20 Black  5"x20 Black 5"x20 BTB 5"x20             UBE retro nv x5' 5 mins x5' x4'  x5'  x5'  5'  5'     Prone ext   2# 2#  2x10 2#2x10 2# x20    x20  x20  5"x20     Prone abd   10"x10 10"x10 10"x10 NP             S l ER   2#5"x20 2#  5" 2x10   2# 5"x20    5"x20  5"x20  5"x20     S/l abd   2# 2#  2x10   2# x20    x20  x20  x20     Levator stretch       20"x4               Foam roll series       x10ea x10ea             towel IR stretch             20"x4  20"x4  20"x4      prone retract              5"x20  5"x20  5"x20      wall slides             10"x10  10"x10  10"x10                                                                                                                                                                           Modalities  9/23 9/30 10/8 10/14 10/21             cp prn                     MHP   x10' 10 mins x10' x10'

## 2019-11-20 ENCOUNTER — OFFICE VISIT (OUTPATIENT)
Dept: PHYSICAL THERAPY | Facility: MEDICAL CENTER | Age: 44
End: 2019-11-20
Payer: COMMERCIAL

## 2019-11-20 DIAGNOSIS — M75.111 NONTRAUMATIC INCOMPLETE TEAR OF RIGHT ROTATOR CUFF: Primary | ICD-10-CM

## 2019-11-20 PROCEDURE — 97112 NEUROMUSCULAR REEDUCATION: CPT

## 2019-11-20 PROCEDURE — 97110 THERAPEUTIC EXERCISES: CPT

## 2019-11-20 PROCEDURE — 97140 MANUAL THERAPY 1/> REGIONS: CPT

## 2019-11-20 NOTE — PROGRESS NOTES
Daily Note     Today's date: 2019  Patient name: Torsten Lewis  : 1975  MRN: [id number]  Referring provider: Evin Harris MD  Dx:   Encounter Diagnosis     ICD-10-CM    1  Nontraumatic incomplete tear of right rotator cuff M75 111                   Subjective: Patient reports his shoulder is feeling good today, offers no new complaints  Objective: See treatment diary below  Precautions: GERD, HTN    Manual  9/23 9/30 10/8 10/14 10/21  11/11  11/13  11/18  11/20     z nv x10' STM  R UT/LS  CR  10 mins x12' x2'             Thoracic cpa mobs Grade III         x4'             PROM R shouler to tolerance         x4'  x10'  8'  x8'  HS x8'        Exercise Diary  9/23 9/30 10/8 10/14 10/21  10/28  11/11  11/13  11/18 11/20   Postural retraining BG                     TB ext Black 5" x20 Black 5"x20 Black  5"x20 Black 5"x20 BTB 5"x20  BTB 5"x20           TB retract Black 5" x20 Black 5"x20 Black  5"x20 Black 5"x20 BTB 5"x20             UBE retro nv x5' 5 mins x5' x4'  x5'  x5'  5'  5'  5'   Prone ext   2# 2#  2x10 2#2x10 2# x20    x20  x20  5"x20  5" x20   Prone abd   10"x10 10"x10 10"x10 NP             S l ER   2#5"x20 2#  5" 2x10   2# 5"x20    5"x20  5"x20  5"x20  5" x20   S/l abd   2# 2#  2x10   2# x20    x20  x20  x20  x20   Levator stretch       20"x4               Foam roll series       x10ea x10ea             towel IR stretch             20"x4  20"x4  20"x4  20"x4    prone retract              5"x20  5"x20  5"x20  5" x20    wall slides             10"x10  10"x10  10"x10  10"x10                                                                                                                                                                         Modalities  9/23 9/30 10/8 10/14 10/21             cp prn                     MHP   x10' 10 mins x10' x10'                     Assessment: Pt will be scheduling surgery and will f/u post op  Plan: Will f/u post op

## 2020-01-22 ENCOUNTER — OFFICE VISIT (OUTPATIENT)
Dept: OBGYN CLINIC | Facility: CLINIC | Age: 45
End: 2020-01-22
Payer: COMMERCIAL

## 2020-01-22 VITALS
WEIGHT: 248 LBS | DIASTOLIC BLOOD PRESSURE: 82 MMHG | HEART RATE: 80 BPM | HEIGHT: 72 IN | SYSTOLIC BLOOD PRESSURE: 121 MMHG | BODY MASS INDEX: 33.59 KG/M2

## 2020-01-22 DIAGNOSIS — M75.111 NONTRAUMATIC INCOMPLETE TEAR OF RIGHT ROTATOR CUFF: Primary | ICD-10-CM

## 2020-01-22 PROCEDURE — 20610 DRAIN/INJ JOINT/BURSA W/O US: CPT | Performed by: PHYSICIAN ASSISTANT

## 2020-01-22 PROCEDURE — 99213 OFFICE O/P EST LOW 20 MIN: CPT | Performed by: PHYSICIAN ASSISTANT

## 2020-01-22 RX ADMIN — LIDOCAINE HYDROCHLORIDE 2 ML: 10 INJECTION, SOLUTION INFILTRATION; PERINEURAL at 18:16

## 2020-01-22 RX ADMIN — BUPIVACAINE HYDROCHLORIDE 2 ML: 2.5 INJECTION, SOLUTION INFILTRATION; PERINEURAL at 18:16

## 2020-01-22 RX ADMIN — TRIAMCINOLONE ACETONIDE 80 MG: 40 INJECTION, SUSPENSION INTRA-ARTICULAR; INTRAMUSCULAR at 18:16

## 2020-01-22 NOTE — PROGRESS NOTES
Chief Complaint   Patient presents with    Right Shoulder - Follow-up         Subjective  patient enters today follow-up for his right shoulder  Patient has improved  Has been doing home exercise program   He completed physical therapy and then transferred to a home exercise program   He is able to reach above his head with no difficulty  Has some pain at times with abduction  Today is some tenderness over the trapezius and the [facility] joint but overall feeling much better  Patient asking for repeat cortisone injection       ROS:  Review of systems form reviewed January 22, 2020   General: no fever, no chills  Respiratory:  No coughing, shortness of breath or wheezing  Cardiovascular:  No chest pain, no palpitations  Musculoskeletal: see HPI and PE  SKIN:  No skin rash, no dry skin  Neurological:  No headaches, no confusion  Psychiatric:  No suicide thoughts, no anxiety, no depression  Review of all other systems is negative    Past Medical History:   Diagnosis Date    GERD (gastroesophageal reflux disease)     Hypertension        Current Outpatient Medications on File Prior to Visit   Medication Sig Dispense Refill    omeprazole (PRILOSEC) 40 MG capsule Take 40 mg by mouth daily      meloxicam (MOBIC) 15 mg tablet Take 1 tablet (15 mg total) by mouth daily (Patient not taking: Reported on 1/22/2020) 30 tablet 0     No current facility-administered medications on file prior to visit          No Known Allergies      Physical Exam:    Vitals:    01/22/20 1803   BP: 121/82   Pulse: 80   Weight: 112 kg (248 lb)   Height: 6' (1 829 m)       General Appearance:  Alert, cooperative, no distress, appears stated age   Lungs:   respirations unlabored   Heart:  Normal heart rate noted   Abdomen:   Soft, non-tender,  no masses   Extremities: Extremities normal, atraumatic, no cyanosis or edema   Pulses: 2+ and symmetric   Skin: Skin color, texture, turgor normal, no rashes or lesions   Neurologic: Normal         Ortho Exam  Examination right shoulder skin intact no erythema  Patient has full active range of motion in all planes  5/5 elevation, 5/5 internal rotation 5/5 external rotation  Sensation is intact to light touch right upper extremity  Tenderness with palpation over the anterior aspect of the shoulder       Imaging  MRI of the right shoulder was reviewed at last visit by doctor Garcia  MRI of the right shoulder shows partial-thickness tear of the anterior leading edge of the supraspinatus      ASSESSMENT:    Rashi Degroot was seen today for follow-up  Diagnoses and all orders for this visit:    Nontraumatic incomplete tear of right rotator cuff    Other orders  -     Large joint arthrocentesis          PLAN:  MRI evidence of partial thickness tear anterior leading edge of supraspinatus  Patient has been improving with his home exercise program   Trying to hold off on surgery and asking for repeat injection today  Went over the risks involved which included but not limited to increased pain, bleeding and infection  He understood these risks and gave verbal consent to have right shoulder injected  Right subacromial space was injected with cortisone no complications noted  Will use ice and injection site for 20 minutes at a time and no strenuous activity for the next 24-48 hours  Patient understands he may repeat these every 3-4 months if needed    Will follow up with us as needed      Large joint arthrocentesis: R subacromial bursa  Date/Time: 1/22/2020 6:16 PM  Consent given by: patient  Site marked: site marked  Timeout: Immediately prior to procedure a time out was called to verify the correct patient, procedure, equipment, support staff and site/side marked as required   Supporting Documentation  Indications: pain   Procedure Details  Location: shoulder - R subacromial bursa  Preparation: Patient was prepped and draped in the usual sterile fashion  Needle gauge: 21   Ultrasound guidance: no  Approach: posterior  Medications administered: 2 mL bupivacaine 0 25 %; 2 mL lidocaine 1 %; 80 mg triamcinolone acetonide 40 mg/mL    Patient tolerance: patient tolerated the procedure well with no immediate complications  Dressing:  Sterile dressing applied          Scribe Attestation    I,:   Jennie Gee PA-C am acting as a scribe while in the presence of the attending physician :        I,:   Jackie Sandhoff, MD personally performed the services described in this documentation    as scribed in my presence :

## 2020-01-23 RX ORDER — BUPIVACAINE HYDROCHLORIDE 2.5 MG/ML
2 INJECTION, SOLUTION INFILTRATION; PERINEURAL
Status: COMPLETED | OUTPATIENT
Start: 2020-01-22 | End: 2020-01-22

## 2020-01-23 RX ORDER — LIDOCAINE HYDROCHLORIDE 10 MG/ML
2 INJECTION, SOLUTION INFILTRATION; PERINEURAL
Status: COMPLETED | OUTPATIENT
Start: 2020-01-22 | End: 2020-01-22

## 2020-01-23 RX ORDER — TRIAMCINOLONE ACETONIDE 40 MG/ML
80 INJECTION, SUSPENSION INTRA-ARTICULAR; INTRAMUSCULAR
Status: COMPLETED | OUTPATIENT
Start: 2020-01-22 | End: 2020-01-22

## 2020-08-05 ENCOUNTER — EVALUATION (OUTPATIENT)
Dept: PHYSICAL THERAPY | Age: 45
End: 2020-08-05
Payer: COMMERCIAL

## 2020-08-05 DIAGNOSIS — M25.511 CHRONIC RIGHT SHOULDER PAIN: Primary | ICD-10-CM

## 2020-08-05 DIAGNOSIS — G89.29 CHRONIC RIGHT SHOULDER PAIN: Primary | ICD-10-CM

## 2020-08-05 PROCEDURE — 97161 PT EVAL LOW COMPLEX 20 MIN: CPT | Performed by: PHYSICAL THERAPIST

## 2020-08-05 PROCEDURE — 97140 MANUAL THERAPY 1/> REGIONS: CPT | Performed by: PHYSICAL THERAPIST

## 2020-08-05 PROCEDURE — 97110 THERAPEUTIC EXERCISES: CPT | Performed by: PHYSICAL THERAPIST

## 2020-08-05 NOTE — PROGRESS NOTES
PT Evaluation     Today's date: 2020  Patient name: Lupe Rob  : 1975  MRN: [id number]  Referring provider: Corina Reinoso PT  Dx:   Encounter Diagnosis     ICD-10-CM    1  Chronic right shoulder pain  M25 511     G89 29        Start Time: 630  Stop Time: 715  Total time in clinic (min): 45 minutes    Assessment  Assessment details: Lupe Rob is a 40 y o  male who presents via Direct Access to physical therapy with right shoulder pain, decreased right UE strength which is affecting proper scapulohumeral rhythm, decreased right shoulder A/PROM and decreased posterior shoulder joint mobility  Due to these impairments, Patient has difficulty performing a/iadls, recreational activities and work-related activities  Patient's clinical presentation is consistent with the physical therapy diagnosis of right shoulder pain  Patient would benefit from skilled physical therapy to address the aforementioned impairments, improve his level of function and to improve his overall quality of life  Impairments: abnormal or restricted ROM, activity intolerance, impaired physical strength, lacks appropriate home exercise program, pain with function, poor posture  and poor body mechanics  Functional limitations: difficulty reaching overhead & behindUnderstanding of Dx/Px/POC: good   Prognosis: good    Goals  ST-3 WEEKS  1  Decrease pain by 2 points on VAS at its worst   2   Increase right shoulder AROM by > 20 in FF, ABD  3   Independent HEP  LT-6 WEEKS  1  Patient to be independent with all iadls and be able to perform with ease  2  Increase functional activities for leisure and home activities to previous LOF  3  Full AROM/PROM    4  Strength >4/5   5  Normal scapulohumeral rhythm without substitution and hiking of shoulder    Plan  Patient would benefit from: skilled physical therapy  Planned therapy interventions: flexibility, functional ROM exercises, home exercise program, joint mobilization, manual therapy, neuromuscular re-education, patient education, postural training, strengthening, stretching, therapeutic activities and therapeutic exercise  Frequency: 2x week  Duration in weeks: 6  Treatment plan discussed with: patient        Subjective Evaluation    History of Present Illness  Mechanism of injury: Pt evaluated via direct access  Pt has a history of right shoulder pain for > 1 year  He received injections and prior physical therapy and had some good relief and was able to get back to exercise in the gym  He now complains of increased pain since he has unable to exercise with covid19  He does have a RTC tear and is trying to avoid surgery  He c/o stiffness and difficulty moving first thing in the morning  Pain  Current pain ratin  At best pain ratin  At worst pain ratin  Location: right shoulder, traps  Quality: dull ache, sharp and tight    Hand dominance: right    Treatments  Previous treatment: physical therapy and injection treatment  Patient Goals  Patient goals for therapy: decreased pain, increased motion, increased strength and return to sport/leisure activities          Objective     Postural Observations  Seated posture: fair  Standing posture: fair        Palpation     Right   Trigger point to upper trapezius       Cervical/Thoracic Screen   Cervical range of motion within normal limits  Thoracic range of motion within normal limits    Neurological Testing     Additional Neurological Details  No nuero    Active Range of Motion   Left Shoulder   Normal active range of motion    Right Shoulder   Flexion: 90 degrees   Abduction: 90 degrees     Additional Active Range of Motion Details  Apleys ER WFL  Apleys IR mid lumbar    Passive Range of Motion   Left Shoulder   Flexion: 170 degrees   Abduction: 165 degrees   External rotation 90°: 90 degrees   Internal rotation 90°: 46 degrees     Right Shoulder   Flexion: 140 degrees   Abduction: 128 degrees   External rotation 90°: 90 degrees   Internal rotation 90°: 28 degrees     Strength/Myotome Testing     Left Shoulder   Normal muscle strength    Right Shoulder     Planes of Motion   Flexion: 3   Abduction: 3   External rotation at 0°: 3+   Internal rotation at 0°: 4     Isolated Muscles   Lower trapezius: 4-   Middle trapezius: 4-   Rhomboids: 4-     Tests     Right Shoulder   Positive empty can         Flowsheet Rows      Most Recent Value   PT/OT G-Codes   Current Score  59   Projected Score  72             Precautions: standard      Manuals 8/5            Right shoulder, upper traps, posterior mob 15'                                                   TherEx             SA press up 30x            Scab stab circles CW/CCW 30x ea            prone IYT NV            Tband RTC ER/IR/ext 30x ea blue            TB row Blue 30x                                                                                                                    HEP 5'                                      Ther Activity                                       Gait Training                                       Modalities                                        HEP- RTC tband exs, row, sleeper stretch, shoulder blade pinches, upper trap stretch

## 2020-08-10 ENCOUNTER — OFFICE VISIT (OUTPATIENT)
Dept: PHYSICAL THERAPY | Age: 45
End: 2020-08-10
Payer: COMMERCIAL

## 2020-08-10 DIAGNOSIS — G89.29 CHRONIC RIGHT SHOULDER PAIN: Primary | ICD-10-CM

## 2020-08-10 DIAGNOSIS — M25.511 CHRONIC RIGHT SHOULDER PAIN: Primary | ICD-10-CM

## 2020-08-10 PROCEDURE — 97110 THERAPEUTIC EXERCISES: CPT | Performed by: PHYSICAL THERAPIST

## 2020-08-10 PROCEDURE — 97140 MANUAL THERAPY 1/> REGIONS: CPT | Performed by: PHYSICAL THERAPIST

## 2020-08-10 NOTE — PROGRESS NOTES
Daily Note     Today's date: 8/10/2020  Patient name: Matt Singh  : 1975  MRN: [id number]  Referring provider: Price Hoang PT  Dx:   Encounter Diagnosis     ICD-10-CM    1  Chronic right shoulder pain  M25 511     G89 29        Start Time: 645  Stop Time: 730  Total time in clinic (min): 45 minutes    Subjective: Pt reports he had increased pain the day after PT and had difficulty moving his arm  He took meloxicam with relief and has one additional episode of increased pain since  Objective: See treatment diary below  PROM improved compared to IE  Assessment: Tolerated treatment well  Patient had functional AROm at the conclusion of the session with decreased pain post-session  Plan: Continue per plan of care        Precautions: standard      Manuals 8/5 8/10           Right shoulder, upper traps, posterior mob 15' 15'                                                  TherEx             SA press up 30x 30           Scab stab circles CW/CCW 30x ea 30xea           Prone row  30x           prone IYT NV 30x ea           Tband RTC ER/IR/ext 30x ea blue 30x           TB row Blue 30x 30x           UBE  6'                                                                                                      HEP 5'                                      Ther Activity                                       Gait Training                                       Modalities

## 2020-08-12 ENCOUNTER — OFFICE VISIT (OUTPATIENT)
Dept: PHYSICAL THERAPY | Age: 45
End: 2020-08-12
Payer: COMMERCIAL

## 2020-08-12 DIAGNOSIS — G89.29 CHRONIC RIGHT SHOULDER PAIN: Primary | ICD-10-CM

## 2020-08-12 DIAGNOSIS — M25.511 CHRONIC RIGHT SHOULDER PAIN: Primary | ICD-10-CM

## 2020-08-12 PROCEDURE — 97140 MANUAL THERAPY 1/> REGIONS: CPT

## 2020-08-12 PROCEDURE — 97110 THERAPEUTIC EXERCISES: CPT

## 2020-08-12 NOTE — PROGRESS NOTES
Daily Note     Today's date: 2020  Patient name: Jenniffer Holloway  : 1975  MRN: [id number]  Referring provider: Rowan Connelly, PT  Dx:   Encounter Diagnosis     ICD-10-CM    1  Chronic right shoulder pain  M25 511     G89 29        Start Time:   Stop Time:   Total time in clinic (min): 45 minutes    Subjective: Patient reports 2/10 right shoulder pain today  Notes his pain increases with cold temperatures ie: ac, ice etc        Objective: See treatment diary below      Assessment: Tolerated treatment well,some discomfort with end rang ABD,   added weight to his mat program with good tolerance and no additional pain, notes feeling better after session  Patient demonstrated fatigue post treatment, exhibited good technique with therapeutic exercises and would benefit from continued PT      Plan: Continue per plan of care        Precautions: standard      Manuals 8/5 8/10 8/12          Right shoulder, upper traps, posterior mob 15' 15' 15'                                                 TherEx             SA press up 30x 30 30x red ball          Scab stab circles CW/CCW 30x ea 30xea 30x  Red ball          Prone row  30x 2#/30          prone IYT NV 30x ea 30x ea          Tband RTC ER/IR/ext 30x ea blue 30x 30x          TB row Blue 30x 30x 30x black          UBE  6' 6'                                                                                                     HEP 5'                                      Ther Activity                                       Gait Training                                       Modalities

## 2020-08-17 ENCOUNTER — OFFICE VISIT (OUTPATIENT)
Dept: PHYSICAL THERAPY | Age: 45
End: 2020-08-17
Payer: COMMERCIAL

## 2020-08-17 DIAGNOSIS — M25.511 CHRONIC RIGHT SHOULDER PAIN: Primary | ICD-10-CM

## 2020-08-17 DIAGNOSIS — G89.29 CHRONIC RIGHT SHOULDER PAIN: Primary | ICD-10-CM

## 2020-08-17 PROCEDURE — 97140 MANUAL THERAPY 1/> REGIONS: CPT | Performed by: PHYSICAL THERAPIST

## 2020-08-17 PROCEDURE — 97110 THERAPEUTIC EXERCISES: CPT | Performed by: PHYSICAL THERAPIST

## 2020-08-17 NOTE — PROGRESS NOTES
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  Objective: See treatment diary below      Assessment: Tolerated treatment well  Patient has increased PROM with decreased IR tightness  Progressed exercises to include bicep/tricep strengthening  Added body blade for scap stability  Plan: Continue per plan of care        Precautions: standard      Manuals 8/5 8/10 8/12 8/17         Right shoulder, upper traps, posterior mob 15' 15' 15' 15' prone scap                                                TherEx             SA press up 30x 30 30x red ball 30x yellow         Scab stab circles CW/CCW 30x ea 30xea 30x  Red ball 30x         Prone row  30x 2#/30 2#/ 30         prone IYT NV 30x ea 30x ea 2#/ 30xea         Tband RTC ER/IR/ext 30x ea blue 30x 30x 30xea         TB row Blue 30x 30x 30x black 30#/ 30         UBE  6' 6' 6'         bodyblade    30"x2         bicep    40#/ 30         tricep    50#/ 30                                                             HEP 5'                                      Ther Activity                                       Gait Training                                       Modalities

## 2020-08-19 ENCOUNTER — OFFICE VISIT (OUTPATIENT)
Dept: PHYSICAL THERAPY | Age: 45
End: 2020-08-19
Payer: COMMERCIAL

## 2020-08-19 DIAGNOSIS — G89.29 CHRONIC RIGHT SHOULDER PAIN: Primary | ICD-10-CM

## 2020-08-19 DIAGNOSIS — M25.511 CHRONIC RIGHT SHOULDER PAIN: Primary | ICD-10-CM

## 2020-08-19 PROCEDURE — 97110 THERAPEUTIC EXERCISES: CPT

## 2020-08-19 PROCEDURE — 97140 MANUAL THERAPY 1/> REGIONS: CPT

## 2020-08-19 NOTE — PROGRESS NOTES
Daily Note     Today's date: 2020  Patient name: Erna Watt  : 1975  MRN: [id number]  Referring provider: Sofia Krueger, PT  Dx:   Encounter Diagnosis     ICD-10-CM    1  Chronic right shoulder pain  M25 511     G89 29        Start Time: 1800  Stop Time: 1900  Total time in clinic (min): 60 minutes    Subjective: Patient reports increased soreness this morning but is better as the day goes  C/o 2-3/10 right shoulder pain today  Objective: See treatment diary below      Assessment: Tolerated treatment well, increased weights with program with good tolerance, some pain and discomfort with end range FF and ABD, IR improving  Patient exhibited good technique with therapeutic exercises and would benefit from continued PT      Plan: Continue per plan of care        Precautions: standard      Manuals 8/5 8/10 8/12 8/17 8/19        Right shoulder, upper traps, posterior mob 15' 15' 15' 15' prone scap 15'                                               TherEx             SA press up 30x 30 30x red ball 30x yellow 30x        Scab stab circles CW/CCW 30x ea 30xea 30x  Red ball 30x 30x        Prone row  30x 2#/30 2#/ 30 2/30        prone IYT NV 30x ea 30x ea 2#/ 30xea 2/30 ea        Tband RTC ER/IR/ext 30x ea blue 30x 30x 30xea 30x ea        TB row Blue 30x 30x 30x black 30#/ 30 40/30        UBE  6' 6' 6' 6'        bodyblade    30"x2 30"x3        bicep    40#/ 30 45/30        tricep    50#/ 30 55/30                                                            HEP 5'                                      Ther Activity                                       Gait Training                                       Modalities

## 2020-08-24 ENCOUNTER — OFFICE VISIT (OUTPATIENT)
Dept: PHYSICAL THERAPY | Age: 45
End: 2020-08-24
Payer: COMMERCIAL

## 2020-08-24 DIAGNOSIS — G89.29 CHRONIC RIGHT SHOULDER PAIN: Primary | ICD-10-CM

## 2020-08-24 DIAGNOSIS — M25.511 CHRONIC RIGHT SHOULDER PAIN: Primary | ICD-10-CM

## 2020-08-24 PROCEDURE — 97140 MANUAL THERAPY 1/> REGIONS: CPT | Performed by: PHYSICAL THERAPIST

## 2020-08-24 PROCEDURE — 97110 THERAPEUTIC EXERCISES: CPT | Performed by: PHYSICAL THERAPIST

## 2020-08-24 NOTE — PROGRESS NOTES
Daily Note     Today's date: 2020  Patient name: Connie De Los Santos  : 1975  MRN: [id number]  Referring provider: Akbar Valerio, PT  Dx:   Encounter Diagnosis     ICD-10-CM    1  Chronic right shoulder pain  M25 511     G89 29        Start Time:   Stop Time:   Total time in clinic (min): 55 minutes    Subjective: Pt c/o stiffness  Objective: See treatment diary below      Assessment: Tolerated treatment well  Patient felt good relief post-session after thoracic mob perfromed  Good PROM with significant increased in IR  Pt able to increase all PREs to functional  and increase weight with no c/o pain  Plan: Continue per plan of care        Precautions: standard      Manuals 8/5 8/10 8/12 8/17 8/19 8/24       Right shoulder, upper traps, posterior mob 15' 15' 15' 15' prone scap 15' 15' t-mob                                              TherEx             SA press up 30x 30 30x red ball 30x yellow 30x 30x       Scab stab circles CW/CCW 30x ea 30xea 30x  Red ball 30x 30x 30x ea       Prone row  30x 2#/30 2#/ 30 2/30 2#/ 30       prone IYT NV 30x ea 30x ea 2#/ 30xea 2/30 ea 2#/ 30       Tband RTC ER/IR/ext 30x ea blue 30x 30x 30xea 30x ea IR 15#  ER 10# 30xFT       FT ext      25#/ 30       TB row Blue 30x 30x 30x black 30#/ 30 40/30 70# cybex       UBE  6' 6' 6' 6' 6'       bodyblade    30"x2 30"x3 30x3 ea fwd/  @side       bicep    40#/ 30 45/30 55#/ 30       tricep    50#/ 30 55/30 55#/ 30                                                           HEP 5'                                      Ther Activity                                       Gait Training                                       Modalities

## 2020-08-26 ENCOUNTER — OFFICE VISIT (OUTPATIENT)
Dept: PHYSICAL THERAPY | Age: 45
End: 2020-08-26
Payer: COMMERCIAL

## 2020-08-26 DIAGNOSIS — G89.29 CHRONIC RIGHT SHOULDER PAIN: Primary | ICD-10-CM

## 2020-08-26 DIAGNOSIS — M25.511 CHRONIC RIGHT SHOULDER PAIN: Primary | ICD-10-CM

## 2020-08-26 PROCEDURE — 97110 THERAPEUTIC EXERCISES: CPT | Performed by: PHYSICAL THERAPIST

## 2020-08-26 PROCEDURE — 97140 MANUAL THERAPY 1/> REGIONS: CPT | Performed by: PHYSICAL THERAPIST

## 2020-08-26 NOTE — PROGRESS NOTES
Daily Note     Today's date: 2020  Patient name: Loan Montelongo  : 1975  MRN: [id number]  Referring provider: Moiz Celis, PT  Dx:   Encounter Diagnosis     ICD-10-CM    1  Chronic right shoulder pain  M25 511     G89 29        Start Time: 1800  Stop Time: 1900  Total time in clinic (min): 60 minutes    Subjective: Patient reports he is doing better, able to lift his arm, still pain in right shoulder with certain movements  Patient reports he is going to the gym and doing UE exercises also  Objective: See treatment diary below      Assessment: Tolerated treatment well  Patient would benefit from continued PT Patient knowledgeable with exercises  Less tenderness today  Plan: Continue per plan of care        Precautions: standard      Manuals 8/5 8/10 8/12 8/17 8/19 8/24 8/26      Right shoulder, upper traps, posterior mob 15' 15' 15' 15' prone scap 15' 15' t-mob 15'                                             TherEx             SA press up 30x 30 30x red ball 30x yellow 30x 30x 30x yellow      Scab stab circles CW/CCW 30x ea 30xea 30x  Red ball 30x 30x 30x ea 30x ea      Prone row  30x 2#/30 2#/ 30 2/30 2#/ 30 2# 30x      prone IYT NV 30x ea 30x ea 2#/ 30xea 2/30 ea 2#/ 30 2# 30x      Tband RTC ER/IR/ext 30x ea blue 30x 30x 30xea 30x ea IR 15#  ER 10# 30xFT IR20# ER15#30xea      FT ext      25#/ 30 25# 30x      TB row Blue 30x 30x 30x black 30#/ 30 40/30 70# cybex 80# 30x      UBE  6' 6' 6' 6' 6' 6'      bodyblade    30"x2 30"x3 30x3 ea fwd/  @side 30" 3x      bicep    40#/ 30 45/30 55#/ 30 55# 30x      tricep    50#/ 30 55/30 55#/ 30 65# 30x      FFw/ER w/ball       green 30x                                             HEP 5'                                      Ther Activity                                       Gait Training                                       Modalities

## 2020-08-31 ENCOUNTER — EVALUATION (OUTPATIENT)
Dept: PHYSICAL THERAPY | Age: 45
End: 2020-08-31
Payer: COMMERCIAL

## 2020-08-31 DIAGNOSIS — M25.511 CHRONIC RIGHT SHOULDER PAIN: Primary | ICD-10-CM

## 2020-08-31 DIAGNOSIS — G89.29 CHRONIC RIGHT SHOULDER PAIN: Primary | ICD-10-CM

## 2020-08-31 PROCEDURE — 97110 THERAPEUTIC EXERCISES: CPT | Performed by: PHYSICAL THERAPIST

## 2020-08-31 PROCEDURE — 97140 MANUAL THERAPY 1/> REGIONS: CPT | Performed by: PHYSICAL THERAPIST

## 2020-08-31 NOTE — PROGRESS NOTES
PT Re-Evaluation     Today's date: 2020  Patient name: Kirstin Anna  : 1975  MRN: [id number]  Referring provider: David Quesada PT  Dx:   Encounter Diagnosis     ICD-10-CM    1  Chronic right shoulder pain  M25 511     G89 29        Start Time:   Stop Time:   Total time in clinic (min): 60 minutes    Assessment  Assessment details: Kirstin Anna has had 8 sessions via direct access for right shoulder pain  To date he has significant pain reduction, increased A/PROM and increased strength  His functional has improved  He will continue PT for 1 visit for HEP instruction and be discharge at that time  Impairments: abnormal or restricted ROM, activity intolerance, impaired physical strength, lacks appropriate home exercise program, pain with function, poor posture  and poor body mechanics  Functional limitations: difficulty reaching overhead & behindUnderstanding of Dx/Px/POC: good   Prognosis: good    Goals  ST-3 WEEKS  1  Decrease pain by 2 points on VAS at its worst  AChieved  2  Increase right shoulder AROM by > 20 in FF, ABD  AChieved  3  Independent HEP  AChieved    LT-6 WEEKS  1  Patient to be independent with all iadls and be able to perform with ease  Achieved  2  Increase functional activities for leisure and home activities to previous LOF  ACHieved  3  Full AROM/PROM  AChieved  4  Strength >4/5  AChieved  5   Normal scapulohumeral rhythm without substitution and hiking of shoulder AChieved    Plan  Patient would benefit from: skilled physical therapy  Planned therapy interventions: flexibility, functional ROM exercises, home exercise program, joint mobilization, manual therapy, neuromuscular re-education, patient education, postural training, strengthening, stretching, therapeutic activities and therapeutic exercise  Frequency: 2x week  Duration in visits: 1  Treatment plan discussed with: patient        Subjective    Objective     Postural Observations  Seated posture: fair  Standing posture: fair        Palpation     Right   Trigger point to upper trapezius  Cervical/Thoracic Screen   Cervical range of motion within normal limits  Thoracic range of motion within normal limits    Neurological Testing     Additional Neurological Details  No nuero    Active Range of Motion   Left Shoulder   Normal active range of motion    Right Shoulder   Flexion: WFL  Abduction: WFL    Additional Active Range of Motion Details  Apleys ER WFL  Apleys IR mid lumbar    Passive Range of Motion   Left Shoulder   Flexion: 170 degrees   Abduction: 165 degrees   External rotation 90°: 90 degrees   Internal rotation 90°: 46 degrees     Right Shoulder   Flexion: 170 (improved from 140) degrees   Abduction: 170 (improved from 128) degrees   External rotation 90°: 100 degrees   Internal rotation 90°: 68 (improved from 28) degrees     Strength/Myotome Testing     Left Shoulder   Normal muscle strength    Right Shoulder     Planes of Motion   Flexion: 4+   Abduction: 4+   External rotation at 0°: 4+   Internal rotation at 0°: 5     Isolated Muscles   Lower trapezius: 4+   Middle trapezius: 4+   Rhomboids: 4+     Tests     Right Shoulder   Positive empty can         Flowsheet Rows      Most Recent Value   PT/OT G-Codes   Current Score  78   Projected Score  72             Precautions: standard      Manuals 8/5 8/10 8/12 8/17 8/19 8/24 8/26 8/31     Right shoulder, upper traps, posterior mob 15' 15' 15' 15' prone scap 15' 15' t-mob 15' 15'                                            TherEx             SA press up 30x 30 30x red ball 30x yellow 30x 30x 30x yellow 30x     Scab stab circles CW/CCW 30x ea 30xea 30x  Red ball 30x 30x 30x ea 30x ea 30x     Prone row  30x 2#/30 2#/ 30 2/30 2#/ 30 2# 30x 3#/ 30     prone IYT NV 30x ea 30x ea 2#/ 30xea 2/30 ea 2#/ 30 2# 30x 3#/ 30     FF         30x yellow     Tband RTC ER/IR/ext 30x ea blue 30x 30x 30xea 30x ea IR 15#  ER 10# 30xFT IR20# ER15#30xea 30x ea     FT ext 25#/ 30 25# 30x 30x ea     TB row Blue 30x 30x 30x black 30#/ 30 40/30 70# cybex 80# 30x 30x     UBE  6' 6' 6' 6' 6' 6' 6'     bodyblade    30"x2 30"x3 30x3 ea fwd/  @side 30" 3x 30"x3 ea     bicep    40#/ 30 45/30 55#/ 30 55# 30x 55#/ 30     tricep    50#/ 30 55/30 55#/ 30 65# 30x 65#/ 30     FFw/ER w/ball       green 30x 30x with foam roll                                            HEP 5'                                      Ther Activity                                       Gait Training                                       Modalities

## 2021-01-15 ENCOUNTER — HOSPITAL ENCOUNTER (EMERGENCY)
Facility: HOSPITAL | Age: 46
Discharge: HOME | End: 2021-01-15
Attending: EMERGENCY MEDICINE | Admitting: EMERGENCY MEDICINE
Payer: COMMERCIAL

## 2021-01-15 ENCOUNTER — APPOINTMENT (EMERGENCY)
Dept: CT IMAGING | Facility: HOSPITAL | Age: 46
End: 2021-01-15
Payer: COMMERCIAL

## 2021-01-15 VITALS
DIASTOLIC BLOOD PRESSURE: 75 MMHG | BODY MASS INDEX: 34.94 KG/M2 | OXYGEN SATURATION: 96 % | RESPIRATION RATE: 18 BRPM | SYSTOLIC BLOOD PRESSURE: 132 MMHG | HEIGHT: 72 IN | HEART RATE: 87 BPM | TEMPERATURE: 97.9 F | WEIGHT: 257.94 LBS

## 2021-01-15 DIAGNOSIS — R06.02 SHORTNESS OF BREATH: ICD-10-CM

## 2021-01-15 DIAGNOSIS — R05.9 COUGH: Primary | ICD-10-CM

## 2021-01-15 DIAGNOSIS — R91.1 LUNG NODULE: ICD-10-CM

## 2021-01-15 DIAGNOSIS — R06.2 WHEEZING: ICD-10-CM

## 2021-01-15 LAB
ANION GAP SERPL CALCULATED.3IONS-SCNC: 8 MMOL/L (ref 4–13)
BASE EX.OXY STD BLDV CALC-SCNC: 71 % (ref 60–80)
BASE EXCESS BLDV CALC-SCNC: -0.5 MMOL/L
BASOPHILS # BLD AUTO: 0.11 THOUSANDS/ΜL (ref 0–0.1)
BASOPHILS NFR BLD AUTO: 1 % (ref 0–1)
BUN SERPL-MCNC: 14 MG/DL (ref 5–25)
CALCIUM SERPL-MCNC: 8.8 MG/DL (ref 8.3–10.1)
CHLORIDE SERPL-SCNC: 107 MMOL/L (ref 100–108)
CO2 SERPL-SCNC: 27 MMOL/L (ref 21–32)
CREAT SERPL-MCNC: 1.04 MG/DL (ref 0.6–1.3)
EOSINOPHIL # BLD AUTO: 2.06 THOUSAND/ΜL (ref 0–0.61)
EOSINOPHIL NFR BLD AUTO: 18 % (ref 0–6)
ERYTHROCYTE [DISTWIDTH] IN BLOOD BY AUTOMATED COUNT: 13.6 % (ref 11.6–15.1)
GFR SERPL CREATININE-BSD FRML MDRD: 86 ML/MIN/1.73SQ M
GLUCOSE SERPL-MCNC: 93 MG/DL (ref 65–140)
HCO3 BLDV-SCNC: 26.1 MMOL/L (ref 24–30)
HCT VFR BLD AUTO: 39.8 % (ref 36.5–49.3)
HGB BLD-MCNC: 12.9 G/DL (ref 12–17)
IMM GRANULOCYTES # BLD AUTO: 0.03 THOUSAND/UL (ref 0–0.2)
IMM GRANULOCYTES NFR BLD AUTO: 0 % (ref 0–2)
LYMPHOCYTES # BLD AUTO: 2.84 THOUSANDS/ΜL (ref 0.6–4.47)
LYMPHOCYTES NFR BLD AUTO: 25 % (ref 14–44)
MCH RBC QN AUTO: 29.9 PG (ref 26.8–34.3)
MCHC RBC AUTO-ENTMCNC: 32.4 G/DL (ref 31.4–37.4)
MCV RBC AUTO: 92 FL (ref 82–98)
MONOCYTES # BLD AUTO: 0.77 THOUSAND/ΜL (ref 0.17–1.22)
MONOCYTES NFR BLD AUTO: 7 % (ref 4–12)
NEUTROPHILS # BLD AUTO: 5.79 THOUSANDS/ΜL (ref 1.85–7.62)
NEUTS SEG NFR BLD AUTO: 49 % (ref 43–75)
NRBC BLD AUTO-RTO: 0 /100 WBCS
O2 CT BLDV-SCNC: 13.7 ML/DL
PCO2 BLDV: 50.5 MM HG (ref 42–50)
PH BLDV: 7.33 [PH] (ref 7.3–7.4)
PLATELET # BLD AUTO: 222 THOUSANDS/UL (ref 149–390)
PMV BLD AUTO: 11.2 FL (ref 8.9–12.7)
PO2 BLDV: 38.7 MM HG (ref 35–45)
POTASSIUM SERPL-SCNC: 4.2 MMOL/L (ref 3.5–5.3)
RBC # BLD AUTO: 4.31 MILLION/UL (ref 3.88–5.62)
SODIUM SERPL-SCNC: 142 MMOL/L (ref 136–145)
WBC # BLD AUTO: 11.6 THOUSAND/UL (ref 4.31–10.16)

## 2021-01-15 PROCEDURE — 36415 COLL VENOUS BLD VENIPUNCTURE: CPT | Performed by: PHYSICIAN ASSISTANT

## 2021-01-15 PROCEDURE — 99284 EMERGENCY DEPT VISIT MOD MDM: CPT

## 2021-01-15 PROCEDURE — 85025 COMPLETE CBC W/AUTO DIFF WBC: CPT | Performed by: PHYSICIAN ASSISTANT

## 2021-01-15 PROCEDURE — G1004 CDSM NDSC: HCPCS

## 2021-01-15 PROCEDURE — 71260 CT THORAX DX C+: CPT

## 2021-01-15 PROCEDURE — 96374 THER/PROPH/DIAG INJ IV PUSH: CPT

## 2021-01-15 PROCEDURE — 82805 BLOOD GASES W/O2 SATURATION: CPT | Performed by: PHYSICIAN ASSISTANT

## 2021-01-15 PROCEDURE — 99284 EMERGENCY DEPT VISIT MOD MDM: CPT | Performed by: PHYSICIAN ASSISTANT

## 2021-01-15 PROCEDURE — 80048 BASIC METABOLIC PNL TOTAL CA: CPT | Performed by: PHYSICIAN ASSISTANT

## 2021-01-15 PROCEDURE — 94640 AIRWAY INHALATION TREATMENT: CPT

## 2021-01-15 RX ORDER — ALBUTEROL SULFATE 2.5 MG/3ML
2.5 SOLUTION RESPIRATORY (INHALATION) EVERY 6 HOURS PRN
Qty: 75 ML | Refills: 0 | Status: SHIPPED | OUTPATIENT
Start: 2021-01-15 | End: 2021-01-22

## 2021-01-15 RX ORDER — SODIUM CHLORIDE FOR INHALATION 0.9 %
3 VIAL, NEBULIZER (ML) INHALATION ONCE
Status: COMPLETED | OUTPATIENT
Start: 2021-01-15 | End: 2021-01-15

## 2021-01-15 RX ORDER — METHYLPREDNISOLONE SODIUM SUCCINATE 125 MG/2ML
125 INJECTION, POWDER, LYOPHILIZED, FOR SOLUTION INTRAMUSCULAR; INTRAVENOUS ONCE
Status: COMPLETED | OUTPATIENT
Start: 2021-01-15 | End: 2021-01-15

## 2021-01-15 RX ORDER — IPRATROPIUM BROMIDE AND ALBUTEROL SULFATE 2.5; .5 MG/3ML; MG/3ML
3 SOLUTION RESPIRATORY (INHALATION) ONCE
Status: COMPLETED | OUTPATIENT
Start: 2021-01-15 | End: 2021-01-15

## 2021-01-15 RX ADMIN — ISODIUM CHLORIDE 3 ML: 0.03 SOLUTION RESPIRATORY (INHALATION) at 05:30

## 2021-01-15 RX ADMIN — IOHEXOL 100 ML: 350 INJECTION, SOLUTION INTRAVENOUS at 06:08

## 2021-01-15 RX ADMIN — METHYLPREDNISOLONE SODIUM SUCCINATE 125 MG: 125 INJECTION, POWDER, FOR SOLUTION INTRAMUSCULAR; INTRAVENOUS at 05:27

## 2021-01-15 RX ADMIN — IPRATROPIUM BROMIDE AND ALBUTEROL SULFATE 3 ML: 2.5; .5 SOLUTION RESPIRATORY (INHALATION) at 05:30

## 2021-01-15 NOTE — ED NOTES
Ambulatory pulse ox before TX was steady 95 -96% did drop to 92% but jumped back up      Laurita Powers RN  01/15/21 6312

## 2021-01-15 NOTE — DISCHARGE INSTRUCTIONS
The following findings require follow up:  Radiographic finding   Findinmm lung nodule   Follow up required: repeat CT scan of the chest in 12 months   Follow up should be done in 1 year

## 2021-01-15 NOTE — ED NOTES
Patient transported to [facility], [address] [place] [address]  01/15/21 0816

## 2021-01-15 NOTE — ED PROVIDER NOTES
History  Chief Complaint   Patient presents with    Cough     Pt reports cough "for two years" States he has been to see pulm and cardiology; but feels like it is now worsening so came for eval  Occ SOB with coughing  70-year-old male with past medical history significant for thyroid disease and GERD who presents to the emergency department for complaint of persistent intermittent productive cough, wheezing, sore throat, and shortness of breath x2 years  Patient reports worsening symptoms in the past week, prompting this ED evaluation  He has been evaluated by pulmonology and ENT, most recently saw pulmonology on 12/29/2020, see note for details of encounter and plan  In summary, symptoms are believed to be related to combination of GERD, seasonal allergic rhinitis, and possibly other allergic component  Patient reports he has had PFT testing performed, as well as other testing which has all come back normal   He has never had a CT scan of the chest   He states, "this is the first time any doctor is hearing my wheezing, usually I have it sporadically at home but never when I've gone to the doctor for them to hear "  He states there is no pattern to the onset of symptoms, no obvious trigger, "no telling when it is all of a sudden going to get bad, it just happens "  He is using albuterol inhaler up to five times a day, with minimal relief; also using saline mist, nasacort, PPI  Next follow up is scheduled for 02/05/2021  He denies accompanying chest pain, difficulty or painful swallowing, leg swelling, abdominal pain or distention, unintentional weight loss, near syncope or syncope  There is no history of asthma or COPD  He is a former tobacco smoker, quit in 2014  Prior to Admission Medications   Prescriptions Last Dose Informant Patient Reported?  Taking?   meloxicam (MOBIC) 15 mg tablet  Self No No   Sig: Take 1 tablet (15 mg total) by mouth daily   Patient not taking: Reported on 1/22/2020 omeprazole (PRILOSEC) 40 MG capsule  Self Yes No   Sig: Take 40 mg by mouth daily      Facility-Administered Medications: None       Past Medical History:   Diagnosis Date    GERD (gastroesophageal reflux disease)     Hypertension        Past Surgical History:   Procedure Laterality Date    KNEE SURGERY Bilateral     torn ligament        Family History   Problem Relation Age of Onset    No Known Problems Mother     SELINA disease Father      I have reviewed and agree with the history as documented  E-Cigarette/Vaping     E-Cigarette/Vaping Substances     Social History     Tobacco Use    Smoking status: Former Smoker    Smokeless tobacco: Never Used    Tobacco comment: quit almost 3 years ago  Substance Use Topics    Alcohol use: Yes     Frequency: 2-4 times a month     Drinks per session: 1 or 2     Comment: occasional    Drug use: No       Review of Systems   Constitutional: Negative for appetite change, chills, fatigue, fever and unexpected weight change  HENT: Positive for postnasal drip and sore throat  Negative for congestion, facial swelling, rhinorrhea, sinus pressure, sinus pain, trouble swallowing and voice change  Respiratory: Positive for cough, shortness of breath and wheezing  Negative for choking, chest tightness and stridor  Cardiovascular: Negative for chest pain, palpitations and leg swelling  Gastrointestinal: Negative for abdominal distention, abdominal pain, diarrhea, nausea and vomiting  Musculoskeletal: Negative for arthralgias, joint swelling, neck pain and neck stiffness  Skin: Negative for color change and rash  Neurological: Negative for dizziness, syncope, weakness, light-headedness and headaches  Hematological: Negative for adenopathy  All other systems reviewed and are negative  Physical Exam  Physical Exam  Vitals signs reviewed  Constitutional:       General: He is awake  He is not in acute distress  Appearance: Normal appearance   He is well-developed  He is not toxic-appearing  HENT:      Head: Normocephalic and atraumatic  Mouth/Throat:      Lips: Pink  Mouth: Mucous membranes are moist       Pharynx: Oropharynx is clear  Uvula midline  Eyes:      Extraocular Movements: Extraocular movements intact  Conjunctiva/sclera: Conjunctivae normal       Pupils: Pupils are equal, round, and reactive to light  Neck:      Musculoskeletal: Normal range of motion and neck supple  Cardiovascular:      Rate and Rhythm: Normal rate and regular rhythm  Pulses: Normal pulses  Heart sounds: Normal heart sounds  Pulmonary:      Effort: Pulmonary effort is normal  Tachypnea present  No accessory muscle usage, prolonged expiration, respiratory distress or retractions  Breath sounds: No stridor, decreased air movement or transmitted upper airway sounds  Examination of the right-upper field reveals wheezing  Examination of the left-upper field reveals wheezing  Examination of the right-middle field reveals wheezing  Examination of the left-middle field reveals wheezing  Examination of the right-lower field reveals wheezing  Examination of the left-lower field reveals wheezing  Wheezing present  No decreased breath sounds, rhonchi or rales  Abdominal:      General: Bowel sounds are normal  There is no distension  Palpations: Abdomen is soft  Tenderness: There is no abdominal tenderness  Musculoskeletal: Normal range of motion  Skin:     General: Skin is warm  Capillary Refill: Capillary refill takes less than 2 seconds  Findings: No erythema, lesion or rash  Neurological:      Mental Status: He is alert and oriented to person, place, and time           Vital Signs  ED Triage Vitals [01/15/21 0449]   Temperature Pulse Respirations Blood Pressure SpO2   97 9 °F (36 6 °C) 90 19 169/88 97 %      Temp Source Heart Rate Source Patient Position - Orthostatic VS BP Location FiO2 (%)   Oral Monitor Lying Right arm --      Pain Score       5           Vitals:    01/15/21 0449 01/15/21 0500 01/15/21 0630   BP: 169/88 169/88 132/75   Pulse: 90 92 87   Patient Position - Orthostatic VS: Lying Lying Lying         Visual Acuity      ED Medications  Medications   ipratropium-albuterol (DUO-NEB) 0 5-2 5 mg/3 mL inhalation solution 3 mL (3 mL Nebulization Given 1/15/21 0530)   sodium chloride 0 9 % inhalation solution 3 mL (3 mL Nebulization Given 1/15/21 0530)   methylPREDNISolone sodium succinate (Solu-MEDROL) injection 125 mg (125 mg Intravenous Given 1/15/21 0527)   iohexol (OMNIPAQUE) 350 MG/ML injection (SINGLE-DOSE) 100 mL (100 mL Intravenous Given 1/15/21 0608)       Diagnostic Studies  Results Reviewed     Procedure Component Value Units Date/Time    Basic metabolic panel [[id number]] Collected: 01/15/21 0526    Lab Status: Final result Specimen: Blood from Arm, Right Updated: 01/15/21 0547     Sodium 142 mmol/L      Potassium 4 2 mmol/L      Chloride 107 mmol/L      CO2 27 mmol/L      ANION GAP 8 mmol/L      BUN 14 mg/dL      Creatinine 1 04 mg/dL      Glucose 93 mg/dL      Calcium 8 8 mg/dL      eGFR 86 ml/min/1 73sq m     Narrative:      Shan guidelines for Chronic Kidney Disease (CKD):     Stage 1 with normal or high GFR (GFR > 90 mL/min/1 73 square meters)    Stage 2 Mild CKD (GFR = 60-89 mL/min/1 73 square meters)    Stage 3A Moderate CKD (GFR = 45-59 mL/min/1 73 square meters)    Stage 3B Moderate CKD (GFR = 30-44 mL/min/1 73 square meters)    Stage 4 Severe CKD (GFR = 15-29 mL/min/1 73 square meters)    Stage 5 End Stage CKD (GFR <15 mL/min/1 73 square meters)  Note: GFR calculation is accurate only with a steady state creatinine    CBC and differential [[id number]]  (Abnormal) Collected: 01/15/21 0526    Lab Status: Final result Specimen: Blood from Arm, Right Updated: 01/15/21 0537     WBC 11 60 Thousand/uL      RBC 4 31 Million/uL      Hemoglobin 12 9 g/dL      Hematocrit 39 8 % MCV 92 fL      MCH 29 9 pg      MCHC 32 4 g/dL      RDW 13 6 %      MPV 11 2 fL      Platelets 519 Thousands/uL      nRBC 0 /100 WBCs      Neutrophils Relative 49 %      Immat GRANS % 0 %      Lymphocytes Relative 25 %      Monocytes Relative 7 %      Eosinophils Relative 18 %      Basophils Relative 1 %      Neutrophils Absolute 5 79 Thousands/µL      Immature Grans Absolute 0 03 Thousand/uL      Lymphocytes Absolute 2 84 Thousands/µL      Monocytes Absolute 0 77 Thousand/µL      Eosinophils Absolute 2 06 Thousand/µL      Basophils Absolute 0 11 Thousands/µL     Blood gas, venous [[id number]]  (Abnormal) Collected: 01/15/21 0526    Lab Status: Final result Specimen: Blood from Arm, Right Updated: 01/15/21 0537     pH, Williams 7 331     pCO2, Williams 50 5 mm Hg      pO2, Williams 38 7 mm Hg      HCO3, Williams 26 1 mmol/L      Base Excess, Williams -0 5 mmol/L      O2 Content, Williams 13 7 ml/dL      O2 HGB, VENOUS 71 0 %                  CT chest with contrast   Final Result by Reuben Reyes MD (01/15 9131)      No acute intrathoracic abnormality  No infiltrate or pleural effusion  2 mm right upper lobe pulmonary nodule  Based on current Fleischner Society 2017 Guidelines on incidental pulmonary nodule, no routine follow-up is needed if the patient is considered low risk for lung cancer  If the patient is considered high risk for    lung cancer, 12 month follow-up non-contrast chest CT is recommended  Workstation performed: [id number]                    Procedures  Procedures         ED Course  ED Course as of Severo 15 0646   Cecillia Picking Severo 15, 2021   1952 CT showing lung nodule, which will require 12 month follow-up noncontrast CT scan of the chest, given risk factors  Labs showing eosinophilia and basophilia  Discussed with the patient that this usually supports an allergic response  Consider allergic bronchospasm/asthma, allergic pneumonitis  Will prescribe neb solution  Advised antihistamine daily   Will hold off on steroids at this point, given unclear origin  F/u with pulmonologist, as previously scheduled, as well as PCP  SBIRT 20yo+      Most Recent Value   SBIRT (24 yo +)   In order to provide better care to our patients, we are screening all of our patients for alcohol and drug use  Would it be okay to ask you these screening questions? Yes Filed at: 01/15/2021 0450   Initial Alcohol Screen: US AUDIT-C    1  How often do you have a drink containing alcohol? 2 Filed at: 01/15/2021 0450   2  How many drinks containing alcohol do you have on a typical day you are drinking? 1 Filed at: 01/15/2021 0450   3a  Male UNDER 65: How often do you have five or more drinks on one occasion? 0 Filed at: 01/15/2021 0450   3b  FEMALE Any Age, or MALE 65+: How often do you have 4 or more drinks on one occassion? 0 Filed at: 01/15/2021 0450   Audit-C Score  3 Filed at: 01/15/2021 8880   GREER: How many times in the past year have you    Used an illegal drug or used a prescription medication for non-medical reasons? Never Filed at: 01/15/2021 0450                    MDM  Number of Diagnoses or Management Options  Cough:   Lung nodule:   Shortness of breath:   Wheezing:   Diagnosis management comments: On exam, nontoxic appearance, afebrile, BP elevated but vitals otherwise unremarkable, audible wheezing, mild inspiratory and expiratory wheezing throughout all lung fields on auscultation, no other adventitious lung sounds, harsh nonproductive cough that is sporadic, mild tachypnea when patient has coughing fit which resolves once coughing stops, no retractions or accessory muscle use, no cyanosis, no leg swelling or abd distention, remainder of exam unremarkable as above  History:  2 years of cough, wheezing, postnasal drip and sore throat which is sporadic, worsening randomly, no trigger    Consider asthma, bronchospasm, other restrictive lung disease, other obstructive lung disease, pneumonitis, GERD related, allergic related  Plan:  Had a discussion with patient regarding goals of care at this ED visit  Patient will unlikely leave ER with definitive diagnosis  This will require continued f/u with pulmonology and completion of their plan  I have observed no hypoxia at rest, will check ambulatory pulse ox and VBG  After sitting at rest for a few minutes without coughing, patient's wheezing sounds minimal   After talking or coughing, wheezing returns  Wheezing is heard with and without stethoscope  Patient does not appear clinically fluid overloaded  Pulmonology is suggesting an allergic and GERD component, which is likely  Will check CBC for any basophilia, eosinophilia, neutrophilia, or lymphocytosis  He has had recent CXR which was normal   Will obtain CT chest to better assess lung structures for any inflammation, scarring, malignancy  Will administer dose of steroid and neb treatment and reassess breathing  Patient believes he was never on steroids however chart review shows prednisone prescribed for 5 days at initial pulm visit; may consider medrol dose pack today  Patient may also benefit from seeing an allergist/immunologist         Amount and/or Complexity of Data Reviewed  Clinical lab tests: reviewed and ordered  Tests in the radiology section of CPT®: ordered and reviewed  Tests in the medicine section of CPT®: ordered and reviewed  Discussion of test results with the performing providers: yes  Decide to obtain previous medical records or to obtain history from someone other than the patient: yes  Obtain history from someone other than the patient: yes  Review and summarize past medical records: yes  Discuss the patient with other providers: yes  Independent visualization of images, tracings, or specimens: yes    Patient Progress  Patient progress: improved (See ED course note for dispo and plan  I reviewed and discussed all lab and imaging findings with the patient at bedside  I discussed emergency department return parameters  I answered any and all questions the patient had regarding emergency department course of evaluation and treatment  The patient verbalized understanding of and agreement with plan   )      Disposition  Final diagnoses:   Cough   Shortness of breath   Wheezing   Lung nodule     Time reflects when diagnosis was documented in both MDM as applicable and the Disposition within this note     Time User Action Codes Description Comment    1/15/2021  6:36 AM Nikole Balloon Add [R05] Cough     1/15/2021  6:37 AM Nikole Balloon Add [R06 02] Shortness of breath     1/15/2021  6:37 AM Nikole Balloon Add [R06 2] Wheezing     1/15/2021  6:37 AM Nikole Balloon Add [R91 1] Lung nodule       ED Disposition     ED Disposition Condition Date/Time Comment    Discharge Stable Fri Severo 15, 2021  6:36 AM Tari Kumar discharge to home/self care  Follow-up Information     Follow up With Specialties Details Why Contact Info Additional [address] [facility] Emergency Medicine Go to  If symptoms worsen [address] [facility] [id number] [facility], [address] [place] [address], [address] [place] [address], MD Pulmonary Disease  Follow-up, as scheduled [address] [facility] Chacho Barrosia  [address] [place] [address] [id number] [phone]             Patient's Medications   Discharge Prescriptions    ALBUTEROL (2 5 MG/3 ML) 0 083 % NEBULIZER SOLUTION    Take 1 vial (2 5 mg total) by nebulization every 6 (six) hours as needed for wheezing or shortness of breath for up to 7 days       Start Date: 1/15/2021 End Date: 1/22/2021       Order Dose: 2 5 mg       Quantity: 75 mL    Refills: 0     Outpatient Discharge Orders   Nebulizer       PDMP Review     None          ED Provider  Electronically Signed by           Calli Norman PA-C  01/15/21 0632

## 2021-10-09 ENCOUNTER — OFFICE VISIT (OUTPATIENT)
Dept: URGENT CARE | Facility: MEDICAL CENTER | Age: 46
End: 2021-10-09
Payer: COMMERCIAL

## 2021-10-09 VITALS
BODY MASS INDEX: 32.78 KG/M2 | TEMPERATURE: 97.4 F | SYSTOLIC BLOOD PRESSURE: 133 MMHG | HEIGHT: 72 IN | DIASTOLIC BLOOD PRESSURE: 89 MMHG | HEART RATE: 72 BPM | WEIGHT: 242 LBS | OXYGEN SATURATION: 96 % | RESPIRATION RATE: 18 BRPM

## 2021-10-09 DIAGNOSIS — M79.642 PAIN OF LEFT HAND: Primary | ICD-10-CM

## 2021-10-09 PROCEDURE — 99213 OFFICE O/P EST LOW 20 MIN: CPT | Performed by: PHYSICIAN ASSISTANT

## 2021-10-09 PROCEDURE — S9088 SERVICES PROVIDED IN URGENT: HCPCS | Performed by: PHYSICIAN ASSISTANT

## 2021-10-09 RX ORDER — LEVOTHYROXINE SODIUM 0.03 MG/1
25 TABLET ORAL DAILY
COMMUNITY
Start: 2020-10-16 | End: 2022-03-14

## 2021-11-01 ENCOUNTER — OFFICE VISIT (OUTPATIENT)
Dept: URGENT CARE | Facility: MEDICAL CENTER | Age: 46
End: 2021-11-01
Payer: COMMERCIAL

## 2021-11-01 ENCOUNTER — APPOINTMENT (OUTPATIENT)
Dept: RADIOLOGY | Facility: MEDICAL CENTER | Age: 46
End: 2021-11-01
Payer: COMMERCIAL

## 2021-11-01 VITALS
HEART RATE: 82 BPM | SYSTOLIC BLOOD PRESSURE: 136 MMHG | DIASTOLIC BLOOD PRESSURE: 84 MMHG | OXYGEN SATURATION: 97 % | HEIGHT: 72 IN | BODY MASS INDEX: 33.59 KG/M2 | RESPIRATION RATE: 18 BRPM | TEMPERATURE: 97.7 F | WEIGHT: 248 LBS

## 2021-11-01 DIAGNOSIS — M25.561 ACUTE PAIN OF RIGHT KNEE: Primary | ICD-10-CM

## 2021-11-01 DIAGNOSIS — M25.561 ACUTE PAIN OF RIGHT KNEE: ICD-10-CM

## 2021-11-01 PROCEDURE — S9088 SERVICES PROVIDED IN URGENT: HCPCS | Performed by: PHYSICIAN ASSISTANT

## 2021-11-01 PROCEDURE — 73562 X-RAY EXAM OF KNEE 3: CPT

## 2021-11-01 PROCEDURE — 99213 OFFICE O/P EST LOW 20 MIN: CPT | Performed by: PHYSICIAN ASSISTANT

## 2021-12-31 ENCOUNTER — APPOINTMENT (OUTPATIENT)
Dept: RADIOLOGY | Facility: MEDICAL CENTER | Age: 46
End: 2021-12-31
Payer: COMMERCIAL

## 2021-12-31 ENCOUNTER — OFFICE VISIT (OUTPATIENT)
Dept: URGENT CARE | Facility: MEDICAL CENTER | Age: 46
End: 2021-12-31
Payer: COMMERCIAL

## 2021-12-31 VITALS
RESPIRATION RATE: 18 BRPM | HEART RATE: 100 BPM | BODY MASS INDEX: 32.64 KG/M2 | WEIGHT: 241 LBS | DIASTOLIC BLOOD PRESSURE: 97 MMHG | OXYGEN SATURATION: 96 % | SYSTOLIC BLOOD PRESSURE: 142 MMHG | TEMPERATURE: 97.9 F | HEIGHT: 72 IN

## 2021-12-31 DIAGNOSIS — M25.562 ACUTE PAIN OF BOTH KNEES: ICD-10-CM

## 2021-12-31 DIAGNOSIS — M25.561 ACUTE PAIN OF BOTH KNEES: Primary | ICD-10-CM

## 2021-12-31 DIAGNOSIS — M70.42 PREPATELLAR BURSITIS OF LEFT KNEE: ICD-10-CM

## 2021-12-31 DIAGNOSIS — S80.02XA CONTUSION OF LEFT KNEE, INITIAL ENCOUNTER: ICD-10-CM

## 2021-12-31 DIAGNOSIS — M25.561 ACUTE PAIN OF BOTH KNEES: ICD-10-CM

## 2021-12-31 DIAGNOSIS — M25.562 ACUTE PAIN OF BOTH KNEES: Primary | ICD-10-CM

## 2021-12-31 DIAGNOSIS — S80.01XA CONTUSION OF RIGHT KNEE, INITIAL ENCOUNTER: ICD-10-CM

## 2021-12-31 PROCEDURE — S9088 SERVICES PROVIDED IN URGENT: HCPCS | Performed by: PHYSICIAN ASSISTANT

## 2021-12-31 PROCEDURE — 99213 OFFICE O/P EST LOW 20 MIN: CPT | Performed by: PHYSICIAN ASSISTANT

## 2021-12-31 PROCEDURE — 73564 X-RAY EXAM KNEE 4 OR MORE: CPT

## 2022-01-15 ENCOUNTER — OFFICE VISIT (OUTPATIENT)
Dept: OBGYN CLINIC | Facility: CLINIC | Age: 47
End: 2022-01-15
Payer: COMMERCIAL

## 2022-01-15 VITALS
DIASTOLIC BLOOD PRESSURE: 68 MMHG | HEART RATE: 95 BPM | WEIGHT: 241 LBS | BODY MASS INDEX: 32.64 KG/M2 | SYSTOLIC BLOOD PRESSURE: 110 MMHG | HEIGHT: 72 IN

## 2022-01-15 DIAGNOSIS — S80.01XA CONTUSION OF RIGHT KNEE, INITIAL ENCOUNTER: ICD-10-CM

## 2022-01-15 DIAGNOSIS — S80.02XA CONTUSION OF LEFT KNEE, INITIAL ENCOUNTER: ICD-10-CM

## 2022-01-15 DIAGNOSIS — M25.561 ACUTE PAIN OF RIGHT KNEE: Primary | ICD-10-CM

## 2022-01-15 DIAGNOSIS — M23.91 ACUTE INTERNAL DERANGEMENT OF RIGHT KNEE: ICD-10-CM

## 2022-01-15 DIAGNOSIS — M70.42 PREPATELLAR BURSITIS OF LEFT KNEE: ICD-10-CM

## 2022-01-15 PROCEDURE — 99214 OFFICE O/P EST MOD 30 MIN: CPT | Performed by: PHYSICIAN ASSISTANT

## 2022-01-15 PROCEDURE — 20610 DRAIN/INJ JOINT/BURSA W/O US: CPT | Performed by: PHYSICIAN ASSISTANT

## 2022-01-15 RX ORDER — BUPIVACAINE HYDROCHLORIDE 2.5 MG/ML
2 INJECTION, SOLUTION INFILTRATION; PERINEURAL
Status: COMPLETED | OUTPATIENT
Start: 2022-01-15 | End: 2022-01-15

## 2022-01-15 RX ORDER — METHYLPREDNISOLONE ACETATE 40 MG/ML
2 INJECTION, SUSPENSION INTRA-ARTICULAR; INTRALESIONAL; INTRAMUSCULAR; SOFT TISSUE
Status: COMPLETED | OUTPATIENT
Start: 2022-01-15 | End: 2022-01-15

## 2022-01-15 RX ADMIN — BUPIVACAINE HYDROCHLORIDE 2 ML: 2.5 INJECTION, SOLUTION INFILTRATION; PERINEURAL at 09:45

## 2022-01-15 RX ADMIN — METHYLPREDNISOLONE ACETATE 2 ML: 40 INJECTION, SUSPENSION INTRA-ARTICULAR; INTRALESIONAL; INTRAMUSCULAR; SOFT TISSUE at 09:45

## 2022-01-15 NOTE — PROGRESS NOTES
Orthopaedic Surgery - Office Note  Marceil Landau (58 y o  male)   : 1975   MRN: [id number]  Encounter Date: 1/15/2022    Chief Complaint   Patient presents with    Left Knee - Pain    Right Knee - Pain         Assessment/Plan  Diagnoses and all orders for this visit:    Acute pain of right knee  -     Ambulatory Referral to Physical Therapy; Future    Contusion of left knee, initial encounter  -     Ambulatory referral to Orthopedic Surgery  -     Ambulatory Referral to Physical Therapy; Future    Prepatellar bursitis of left knee  -     Ambulatory referral to Orthopedic Surgery  -     Ambulatory Referral to Physical Therapy; Future    Acute internal derangement of right knee  -     Ambulatory Referral to Physical Therapy; Future    Other orders  -     Large joint arthrocentesis    Regarding the patient's left knee prepatellar bursitis was reviewed with the patient in the office today  He was advised to avoid kneeling or direct pressure to the prepatellar bursa  Was educated on watching for signs of infection in advised to return immediately if they occur  He was advised that there is a chance this will reoccur special if he kneels or hits the front of the knee  If it does significantly swell back up he may return for an appointment for aspiration and cortisone injection  He was advised normally we would try 3 aspiration and injections prior to considering a prepatellar bursectomy  Regarding  Patient's right knee  He was advised he likely has a bone contusion on the medial aspect of the knee  A medial meniscus tear cannot be ruled out but symptomatically he does not appear to have 1  He was advised he has mild underlying osteoarthritis which may have also been flared up on the contusion  Treatment options were reviewed and patient elects to proceed with a right knee cortisone injection today in combination with starting physical therapy for the right and left knee        He will return in 2-3 weeks at which time if he is not improved with the cortisone injection oral Mobic physical therapy icing and home exercise program we could consider an MRI of the right knee to evaluate for medial meniscus tear and surgical planning  All questions concerns were answered in the office today  Return 2-3 weeks with ortho/sports medicine  History of Present Illness  This is a new patient with right and left knee pain  He was injured in late December while roller skating he hit a brick wall  He was seen on 12/31/2021 and had x-rays taken which were negative for fracture but he was noted to have a left knee prepatellar bursitis at that time  He denies any fever or chills since that time  Patient has been using Mobic without GI upset  Patient does have a history of right and left knee surgery in the past   Patient believes the right knee had surgery in the 1990s  He states the left knee had surgery more recently  He is unsure of what type of surgery was but thinks it was a meniscus surgery  Regarding the left knee  The pain is primarily in the front in the prepatellar bursa region  He states the swelling has gone down  He denies any current swelling fever chills or significant pain  He reports if he bumps the front of the knee it is still little bit sore  Regarding the right knee  Patient describes medial knee pain that comes and goes  He relates episodes of having to put a pillow between his knees to be able to sleep due to the pain in the medial aspect  He describes it as both an ache and sharp pain  He at this time denies mechanical symptoms such as locking or clicking  He is unsure of whether there was a twisting injury at the time of hitting the wall  No swelling is reported in the knee  Patient is employed as a [employer] 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  He does not have to do a lot of kneeling and 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      Review of Systems  Pertinent items are noted in HPI  All other systems were reviewed and are negative  Physical Exam  /68   Pulse 95   Ht 6' (1 829 m)   Wt 109 kg (241 lb)   BMI 32 69 kg/m²   Cons: Appears well  No apparent distress  Psych: Alert  Oriented x3  Mood and affect normal   Eyes: PERRLA, EOMI  Resp: Normal effort  No audible wheezing or stridor  CV: Palpable pulse  No discernable arrhythmia  Lymph:  No palpable cervical, axillary, or inguinal lymphadenopathy  Skin: Warm  No palpable masses  No visible lesions  Neuro: Normal muscle tone  Normal and symmetric DTR's  Patient's left knee has no skin breakdown lesions or signs of infection  He is mildly tender to palpation at the prepatellar bursa which is not significantly swollen or infected  He has no intra-articular effusion  He has full extension and flexes to 130°  Ligamentously he is stable to valgus and varus stressing  There is no medial or  Lateral joint line tenderness  He has a negative medial and lateral Amelia's  He has no instability Lachman's or posterior drawer  Quad and hamstring strength are 5- out of 5  Patella is tracking midline  There is no evidence of a quad rupture or patella tendon injury  Patient's left knee has well-healed arthroscopic surgical incisions  Patient's right knee has no skin breakdown lesions or signs of infection  He is tender to palpation on the medial joint line as well as the medial tibial plateau  He has no instability to valgus or varus stressing  There is no intra-articular effusion  He has a negative medial and lateral Amelia's  He is nontender on the lateral joint line  He has no instability to Lachman's or posterior drawer  Quad and hamstring strength are 4+ out of 5  Patella is tracking midline  There is no evidence of quad rupture or patella tendon injury  Gait is within normal limits  I am unable to appreciate any surgical scars on the right knee  Studies Reviewed  Study Result    Narrative & Impression   RIGHT KNEE     INDICATION:   M25 561: Pain in right knee  M25 562: Pain in left knee      COMPARISON:  11/01/2021     VIEWS:  XR KNEE 4+ VW RIGHT INJURY   Images: 5     FINDINGS:     There is no acute fracture or dislocation      There is no joint effusion      No significant degenerative changes      No lytic or blastic osseous lesion      Minimal soft tissue swelling anterior to the knee joint      IMPRESSION:     No acute osseous abnormality            Workstation performed: [id number]        Study Result    Narrative & Impression   LEFT KNEE     INDICATION:   M25 561: Pain in right knee  M25 562: Pain in left knee      COMPARISON:  None     VIEWS:  XR KNEE 4+ VW LEFT INJURY   Images: 5     FINDINGS:     There is no acute fracture or dislocation      There is a small joint effusion      No significant degenerative changes      No lytic or blastic osseous lesion      Prepatellar soft tissue swelling      IMPRESSION:     Prepatellar soft tissue swelling  Correlate for contusion and/or prepatellar bursitis      Workstation performed: [id number]     X-rays of the right and left knee were independently reviewed by myself as well as the reports  Urgent care notes from 12/31/2021 were reviewed by myself in the office today  Large joint arthrocentesis: R knee  Universal Protocol:  Consent: Verbal consent obtained  Risks and benefits: risks, benefits and alternatives were discussed  Consent given by: patient  Time out: Immediately prior to procedure a "time out" was called to verify the correct patient, procedure, equipment, support staff and site/side marked as required    Patient understanding: patient states understanding of the procedure being performed  Patient consent: the patient's understanding of the procedure matches consent given  Procedure consent: procedure consent matches procedure scheduled  Relevant documents: relevant documents present and verified  Test results: test results available and properly labeled  Radiology Images displayed and confirmed  If images not available, report reviewed: imaging studies available  Patient identity confirmed: verbally with patient    Supporting Documentation  Indications: pain   Procedure Details  Location: knee - R knee  Needle size: 22 G  Ultrasound guidance: no  Approach: anterolateral  Medications administered: 2 mL bupivacaine 0 25 %; 2 mL methylPREDNISolone acetate 40 mg/mL           Medical, Surgical, Family, and Social History  The patient's medical history, family history, and social history, were reviewed and updated as appropriate  Past Medical History:   Diagnosis Date    GERD (gastroesophageal reflux disease)     Hypertension        Past Surgical History:   Procedure Laterality Date    KNEE SURGERY Bilateral     torn ligament        Family History   Problem Relation Age of Onset    No Known Problems Mother     SELINA disease Father        Social History     Occupational History    Not on file   Tobacco Use    Smoking status: Former Smoker    Smokeless tobacco: Never Used    Tobacco comment: quit almost 3 years ago      Substance and Sexual Activity    Alcohol use: Yes     Comment: occasional    Drug use: No    Sexual activity: Not on file       No Known Allergies      Current Outpatient Medications:     levothyroxine 25 mcg tablet, Take 25 mcg by mouth daily, Disp: , Rfl:     meloxicam (MOBIC) 15 mg tablet, Take 1 tablet (15 mg total) by mouth daily (Patient not taking: Reported on 1/22/2020), Disp: 30 tablet, Rfl: 0    omeprazole (PRILOSEC) 40 MG capsule, Take 40 mg by mouth daily, Disp: , Rfl:       [facility], PA-C

## 2022-01-21 ENCOUNTER — TELEPHONE (OUTPATIENT)
Dept: OBGYN CLINIC | Facility: OTHER | Age: 47
End: 2022-01-21

## 2022-01-21 NOTE — TELEPHONE ENCOUNTER
Left message regarding 01/28/22 appt  Dr Lori Edwards will not be available at the time that patient is scheduled for  Advised to call office back to reschedule at a different time and date

## 2022-01-24 ENCOUNTER — EVALUATION (OUTPATIENT)
Dept: PHYSICAL THERAPY | Age: 47
End: 2022-01-24
Payer: COMMERCIAL

## 2022-01-24 DIAGNOSIS — M23.91 ACUTE INTERNAL DERANGEMENT OF RIGHT KNEE: ICD-10-CM

## 2022-01-24 DIAGNOSIS — S80.02XA CONTUSION OF LEFT KNEE, INITIAL ENCOUNTER: Primary | ICD-10-CM

## 2022-01-24 DIAGNOSIS — M25.561 ACUTE PAIN OF RIGHT KNEE: ICD-10-CM

## 2022-01-24 DIAGNOSIS — M70.42 PREPATELLAR BURSITIS OF LEFT KNEE: ICD-10-CM

## 2022-01-24 PROCEDURE — 97110 THERAPEUTIC EXERCISES: CPT | Performed by: PHYSICAL THERAPIST

## 2022-01-24 PROCEDURE — 97161 PT EVAL LOW COMPLEX 20 MIN: CPT | Performed by: PHYSICAL THERAPIST

## 2022-01-24 NOTE — PROGRESS NOTES
PT Evaluation     Today's date: 2022  Patient name: Kirstin Anna  : 1975  MRN: [id number]  Referring provider: BLAIR Okeefe*  Dx:   Encounter Diagnosis     ICD-10-CM    1  Contusion of left knee, initial encounter  S80  02XA Ambulatory Referral to Physical Therapy   2  Prepatellar bursitis of left knee  M70 42 Ambulatory Referral to Physical Therapy   3  Acute pain of right knee  M25 561 Ambulatory Referral to Physical Therapy   4  Acute internal derangement of right knee  M23 91 Ambulatory Referral to Physical Therapy       Start Time: 730  Stop Time: 823  Total time in clinic (min): 53 minutes    Assessment  Assessment details: Patient is a 55 y o  male who presents with the following impairments: decreased end range  ROM and  LE strength  Due to these impairments patient has difficulty performing limited participation in ADL/IADLs, limited participation in social and recreational activities and limited tolerance for functional tasks within the home and community, including kneeling difficulty and interrupted sleep    Impairments: abnormal gait, abnormal or restricted ROM, impaired physical strength, lacks appropriate home exercise program, pain with function and weight-bearing intolerance  Understanding of Dx/Px/POC: good   Prognosis: good    Goals  STG 2-3 weeks  1  Decrease pain by 2-3 points on a visual pain scale  2  Increase ROM by 5 degrees in all deficient planes  3  Increase strength by 1/2 muscle grade B LE    LTG 4-6 weeks  1  [place] in home exercise program, ongoing throughout POC  2  Independent ambulation on all surfaces for functional distances within the community without antalgia  3  Increased kneeling tolerance  4  Sleep without pain increase      Plan  Patient would benefit from: skilled physical therapy  Planned modality interventions: thermotherapy: hydrocollator packs, unattended electrical stimulation and cryotherapy  Planned therapy interventions: therapeutic activities, therapeutic exercise, functional ROM exercises, manual therapy, joint mobilization, gait training and neuromuscular re-education  Frequency: 2  Duration in weeks: 6  Plan of Care beginning date: 2022  Plan of Care expiration date: 3/25/2022  Treatment plan discussed with: patient        Subjective Evaluation    History of Present Illness  Date of onset: 2021  Mechanism of injury: Involved in a roller skating accident late December when his knees hit a brick wall  Left knee since injury has swelled up twice  Notes right knee medial knee pain that is now more constant since cortisone injection one week ago  Was attending gym 2 times per week prior to injury  Quality of life: good    Pain  Current pain ratin  At best pain ratin  At worst pain ratin  Location: medial pain right knee; left kneefeels bruised  Alleviating factors: compression sleeve on right increases pain  Exacerbated by: kneeling  Progression: improved    Social Support    Employment status: working  Patient Goals  Patient goals for therapy: decreased pain  Patient goal: back into gym and on [employer]        Objective     Tenderness   Left Knee   Tenderness in the medial joint line and patellar tendon  Right Knee   Tenderness in the medial joint line       Neurological Testing     Sensation     Knee   Left Knee   Intact: light touch    Right Knee   Intact: light touch     Active Range of Motion   Left Knee   Flexion: 133 degrees     Right Knee   Flexion: 128 degrees     Passive Range of Motion   Left Knee   Normal passive range of motion  Flexion: WFL    Right Knee   Normal passive range of motion  Flexion: [facility]    Strength/Myotome Testing     Left Knee   Flexion: 4  Extension: 4  Quadriceps contraction: good    Right Knee   Flexion: 4  Extension: 4  Quadriceps contraction: good    Swelling     Left Knee Girth Measurement (cm)   Joint line: 42 cm    Right Knee Girth Measurement (cm)   Joint line: 40 5 cm    Functional Assessment        Comments  Kneeling left knee  Compression right knee  Interrupted sleep      Flowsheet Rows      Most Recent Value   PT/OT G-Codes    Current Score 73   Projected Score 82             Precautions: HTN, previous knee surgeries      Manuals 1/24/ 2022                                                                Neuro Re-Ed                                                                                           Ther Ex             Hamstring stretch 3'  PT performed            SLR 3#/30            Bridge with ball  30x                                      slant 30"/4x  L3            bike 5'                         Hip ABD 30#/  30x            Hip ADD 30#/  30x            Leg Press 115#/  30x                                                                             Ther Activity                                       Gait Training                                       Modalities

## 2022-01-26 ENCOUNTER — OFFICE VISIT (OUTPATIENT)
Dept: PHYSICAL THERAPY | Age: 47
End: 2022-01-26
Payer: COMMERCIAL

## 2022-01-26 DIAGNOSIS — S80.02XA CONTUSION OF LEFT KNEE, INITIAL ENCOUNTER: Primary | ICD-10-CM

## 2022-01-26 DIAGNOSIS — M23.91 ACUTE INTERNAL DERANGEMENT OF RIGHT KNEE: ICD-10-CM

## 2022-01-26 DIAGNOSIS — M70.42 PREPATELLAR BURSITIS OF LEFT KNEE: ICD-10-CM

## 2022-01-26 DIAGNOSIS — M25.561 ACUTE PAIN OF RIGHT KNEE: ICD-10-CM

## 2022-01-26 PROCEDURE — 97110 THERAPEUTIC EXERCISES: CPT

## 2022-01-26 NOTE — PROGRESS NOTES
Daily Note     Today's date: 2022  Patient name: Iggy Price  : 1975  MRN: [id number]  Referring provider: BLAIR Gu*  Dx:   Encounter Diagnosis     ICD-10-CM    1  Contusion of left knee, initial encounter  S80  02XA    2  Prepatellar bursitis of left knee  M70 42    3  Acute pain of right knee  M25 561    4  Acute internal derangement of right knee  M23 91        Total time in clinic (min): 45 minutes    Subjective: Patient reports 1/10 R knee pain, Left feels good today, notes increased soreness after his initial visit  Objective: See treatment diary below      Assessment: Tolerated treatment fairly well, Patient required verbal and tactile cueing throughout prescribed exercises for proper execution and dosage  Some tenderness bl knees maycol medial joint line  Legs felt fatigued after session  Patient exhibited good technique with therapeutic exercises and would benefit from continued PT      Plan: Continue per plan of care        Precautions: HTN, previous knee surgeries      Manuals                                                                Neuro Re-Ed                                                                                           Ther Ex             Hamstring stretch 3'  PT performed 5'           SLR 3#/30 3#/30           Bridge with ball  30x 30x             Stand hip abd/ext  2#/30x                        slant 30"/4x  L3 4x           bike 5' 5 min                        Hip ABD 30#/  30x 35/30           Hip ADD 30#/  30x 35/30           Leg Press 115#/  30x 115/30                                                                            Ther Activity                                       Gait Training                                       Modalities

## 2022-01-28 NOTE — PROGRESS NOTES
Daily Note     Today's date: 2022  Patient name: Ashley Kong  : 1975  MRN: [id number]  Referring provider: BLAIR Mendoza*  Dx:   Encounter Diagnosis     ICD-10-CM    1  Contusion of left knee, initial encounter  S80  02XA    2  Prepatellar bursitis of left knee  M70 42    3  Acute pain of right knee  M25 561    4  Acute internal derangement of right knee  M23 91                   Subjective: ***      Objective: See treatment diary below      Assessment: Tolerated treatment {Tolerated treatment :1198957969}   Patient {assessment:3837299638}      Plan: {PLAN:1018341486}     Precautions: HTN, previous knee surgeries      Manuals                                                                Neuro Re-Ed                                                                                           Ther Ex             Hamstring stretch 3'  PT performed 5'           SLR 3#/30 3#/30           Bridge with ball  30x 30x             Stand hip abd/ext  2#/30x                        slant 30"/4x  L3 4x           bike 5' 5 min                        Hip ABD 30#/  30x 35/30           Hip ADD 30#/  30x 35/30           Leg Press 115#/  30x 115/30                                                                            Ther Activity                                       Gait Training                                       Modalities

## 2022-01-31 ENCOUNTER — TELEPHONE (OUTPATIENT)
Dept: OBGYN CLINIC | Facility: HOSPITAL | Age: 47
End: 2022-01-31

## 2022-01-31 ENCOUNTER — APPOINTMENT (OUTPATIENT)
Dept: PHYSICAL THERAPY | Age: 47
End: 2022-01-31
Payer: COMMERCIAL

## 2022-01-31 NOTE — TELEPHONE ENCOUNTER
I returned a call to the patient and reviewed his symptoms  He reports initial relief of pain from the injection but now is getting continued sharp pain several times throughout the day  Advised him he can hold on formal physical therapy but should continue the home exercise program   He will keep is February 8th follow-up appointment at which time he will have a repeat evaluation and further consideration will be given towards an MRI as conservative treatments of an injection and therapy have failed to relieve his symptoms    All questions have been answered he will call the therapy department to cancel the rest of his schedules

## 2022-01-31 NOTE — TELEPHONE ENCOUNTER
Patient saw Rajinder Quan and referred him to Dr Alonzo Lucas   His appt is 2/8  He is calling to state that after having the cortisone shot in the right knee and doing PT, his knee is worse than before  Should he continue PT?     Callback #[phone]

## 2022-02-08 ENCOUNTER — OFFICE VISIT (OUTPATIENT)
Dept: OBGYN CLINIC | Facility: OTHER | Age: 47
End: 2022-02-08
Payer: COMMERCIAL

## 2022-02-08 VITALS
DIASTOLIC BLOOD PRESSURE: 88 MMHG | SYSTOLIC BLOOD PRESSURE: 134 MMHG | BODY MASS INDEX: 32.51 KG/M2 | HEART RATE: 82 BPM | WEIGHT: 240 LBS | HEIGHT: 72 IN

## 2022-02-08 DIAGNOSIS — M25.562 ACUTE PAIN OF LEFT KNEE: ICD-10-CM

## 2022-02-08 DIAGNOSIS — R26.89 ANTALGIC GAIT: ICD-10-CM

## 2022-02-08 DIAGNOSIS — S89.91XA RIGHT KNEE INJURY, INITIAL ENCOUNTER: Primary | ICD-10-CM

## 2022-02-08 PROCEDURE — 99214 OFFICE O/P EST MOD 30 MIN: CPT | Performed by: ORTHOPAEDIC SURGERY

## 2022-02-08 RX ORDER — LIDOCAINE 50 MG/G
OINTMENT TOPICAL AS NEEDED
Qty: 35.44 G | Refills: 2 | Status: SHIPPED | OUTPATIENT
Start: 2022-02-08

## 2022-02-08 NOTE — PROGRESS NOTES
Chief Complaint: ***    HPI:    Loan Montelongo is a *** year old {Male Female Bisexual:16911} who presents today for {New/followup:02708}    Athlete: {YES-DESCRIBE/NO:42739}    Injury related: {Yes/No With Yes Description:86247}    If not injury related:{desc; hpi onset:66053}    Description of symptoms: ***    Summary of treatment to-date: {prev rx:944256}    {Imaging Review Statement:3809924489}    There is no problem list on file for this patient  Current Outpatient Medications on File Prior to Visit   Medication Sig Dispense Refill    omeprazole (PRILOSEC) 40 MG capsule Take 40 mg by mouth daily      levothyroxine 25 mcg tablet Take 25 mcg by mouth daily      meloxicam (MOBIC) 15 mg tablet Take 1 tablet (15 mg total) by mouth daily (Patient not taking: Reported on 2020) 30 tablet 0     No current facility-administered medications on file prior to visit  No Known Allergies     Tobacco Use: Medium Risk    Smoking Tobacco Use: Former Smoker    Smokeless Tobacco Use: Never Used        Social Determinants of Health     Tobacco Use: Medium Risk    Smoking Tobacco Use: Former Smoker    Smokeless Tobacco Use: Never Used   Alcohol Use: Not on file   Financial Resource Strain: Not on file   Food Insecurity: Not on file   Transportation Needs: Not on file   Physical Activity: Not on file   Stress: Not on file   Social Connections: Not on file   Intimate Partner Violence: Not on file   Depression: Not on file   Housing Stability: Not on file               Review of Systems     Body mass index is 32 55 kg/m²  Physical Exam     Ortho Exam:    Body part: {right/left/bilateral:95095} ***    Inspection: ***    Palpation: ***    Range of motion: ***    Special Tests: ***    Distal Neurovascular Status: Intact, {Yes/No with explanation/N/A:02392}    Procedures       Assessment:    No diagnosis found       Plan:    ***    {VB BMI Counselin}    Follow-Up:    ***        Portions of the record may have been created with voice recognition software  Occasional wrong word or "sound alike" substitutions may have occurred due to the inherent limitations of voice recognition software  Please review the chart carefully and recognize, using context, where substitutions/typographical errors may have occurred

## 2022-02-08 NOTE — PROGRESS NOTES
Assessment:       1  Right knee injury, initial encounter    2  Antalgic gait    3  Acute pain of left knee          Plan:        I explained my current clinical findings and reviewed radiographic findings with Carlos Enrique Given today  With regards to his left knee his symptoms are gradually improving and explained to him that he likely developed a hemorrhagic prepatellar bursitis  His suggested to wear a knee pad if needed and try to avoid kneeling activities  He may apply topical lidocaine ointment to his left knee for discomfort as needed  This was prescribed on today's office visit  With regards to his right knee pain, I do have suspicion for either a medial meniscus tear or possible medial femoral condyle contusion/occult fracture  Hence, I will request an MRI of the right knee for further evaluation in this regard  He is suggested to avoid doing any high impact activities from his right knee at this time  I will follow up with him after the right knee MRI  Subjective:     Patient ID: Kymberly Ochoa is a 55 y o  male  Chief Complaint:    GILDARDO  Kvng Floyd is a 54-year-old male who presents today for evaluation of bilateral knee pain  He reports that symptoms initially started abruptly on 12/18/2021  Patient was reportedly roller skating and accidentally had a direct impact to both his knees on non padded concrete wall  Thereafter, he noticed pain and swelling which was localized to the left anterior knee  Additionally, he also noticed pain in the right knee which was more on the anteromedial aspect  Prior to this incident, the patient denies having any significant problems with either knees  Post injury, the patient was seen by orthopedic PA Matthieu Baker on 1/15/2022  He was noted to have left knee prepatellar bursitis  He was also suspected to have a potential right knee medial meniscus tear and received right knee intra-articular cortisone injection    Thereafter, he was referred for physical therapy rehabilitation  Today, the patient reports that his right knee pain has actually worsened following the intra-articular cortisone injection and physical therapy and is having difficulty with weight-bearing and ambulation  He denies any warmth or redness of the right knee and denies any true locking episodes of the right knee  With regards to his left knee, the swelling has improved and the pain is now minimal primary localized to the anterior knee and is made worse with direct pressure and kneeling  He denies any mechanical symptoms of the left knee or any instability  Social History     Occupational History    Not on file   Tobacco Use    Smoking status: Former Smoker    Smokeless tobacco: Never Used    Tobacco comment: quit almost 3 years ago  Substance and Sexual Activity    Alcohol use: Yes     Comment: occasional    Drug use: No    Sexual activity: Not on file      Review of Systems   Constitutional: Negative  HENT: Negative  Eyes: Negative  Respiratory: Negative  Cardiovascular: Negative  Gastrointestinal: Negative  Endocrine: Negative  Genitourinary: Negative  Skin: Negative  Allergic/Immunologic: Negative  Neurological: Negative  Psychiatric/Behavioral: Negative  Objective:     Right Knee Exam     Tenderness   The patient is experiencing tenderness in the medial joint line (Tender over the medial femoral condyle)  Range of Motion   Extension: normal   Flexion: 110     Tests   Amelia:  Lateral - negative  Varus: negative Valgus: negative  Lachman:  Anterior - negative    Posterior - negative  Drawer:  Anterior - negative    Posterior - negative    Other   Erythema: absent  Sensation: normal  Pulse: present  Swelling: mild    Comments:  Mild right knee effusion  Equivocal medial Amelia's with pain  Left Knee Exam     Tenderness   Left knee tenderness location: Tenderness in the prepatellar area      Range of Motion   Extension: normal   Flexion: normal     Tests   Amelia:  Medial - negative Lateral - negative  Varus: negative Valgus: negative  Lachman:  Anterior - negative    Posterior - negative  Drawer:  Anterior - negative     Posterior - negative  Patellar apprehension: negative    Other   Erythema: absent  Sensation: normal  Pulse: present  Swelling: none  Effusion: no effusion present          Observations   Left Knee   Negative for effusion  Physical Exam  Vitals and nursing note reviewed  Constitutional:       Appearance: He is well-developed  HENT:      Head: Normocephalic and atraumatic  Eyes:      Conjunctiva/sclera: Conjunctivae normal    Cardiovascular:      Rate and Rhythm: Normal rate and regular rhythm  Pulmonary:      Effort: Pulmonary effort is normal  No respiratory distress  Musculoskeletal:      Right knee:      Instability Tests: Lateral Amelia test negative  Left knee: No effusion  Instability Tests: Medial Amelia test negative and lateral Amelia test negative  Skin:     General: Skin is warm  Findings: No erythema  Neurological:      Mental Status: He is alert and oriented to person, place, and time  Psychiatric:         Behavior: Behavior normal          Thought Content: Thought content normal          Judgment: Judgment normal            I have personally reviewed pertinent films in PACS and my interpretation is Plain radiograph of the left knee was reviewed from 12/31/2021  This reveals a prepatellar soft tissue swelling indicative of prepatellar bursitis  No acute osseous injury of the left knee is noted  Mild medial compartment arthritic change noted  Plain radiograph of the right knee from 12/31/2021 was reviewed  This does not reveal any acute osseous injury  Minimal medial compartment degenerative change noted  Sony Hunter

## 2022-03-02 ENCOUNTER — HOSPITAL ENCOUNTER (OUTPATIENT)
Dept: MRI IMAGING | Facility: HOSPITAL | Age: 47
Discharge: HOME | End: 2022-03-02
Attending: ORTHOPAEDIC SURGERY
Payer: COMMERCIAL

## 2022-03-02 DIAGNOSIS — R26.89 ANTALGIC GAIT: ICD-10-CM

## 2022-03-02 DIAGNOSIS — S89.91XA RIGHT KNEE INJURY, INITIAL ENCOUNTER: ICD-10-CM

## 2022-03-02 PROCEDURE — 73721 MRI JNT OF LWR EXTRE W/O DYE: CPT

## 2022-03-02 PROCEDURE — G1004 CDSM NDSC: HCPCS

## 2022-03-07 NOTE — PROGRESS NOTES
Chief Complaint: follow up left knee    HPI:    Aisha Garland is a 55year old Male who presents today for follow up of left knee pain  He was last seen and examined on 2/8/22 for this  He sustained injury on 12/18/22 when he had an accidental fall with direct impact to his knees  There was concern for medial meniscus injury thus an MRI was ordered for further evalaution    Description of symptoms:  Patient continues to experience significant right knee pain which is primarily on the medial aspect  It is sharp in nature  He does report intermittent clicking and popping but has denied any true locking episodes at this time  Denies any new injury  He has not improved with physical therapy and intra-articular cortisone injection  I have personally reviewed pertinent films in PACS and my interpretation is MRI of the right knee from 3/2/2022 reveals a complex parrot-beak tear of the medial meniscus involving the body and the posterior horn as well as mild medial compartment osteoarthritis and grade 1 MCL sprain       There is no problem list on file for this patient  Current Outpatient Medications on File Prior to Visit   Medication Sig Dispense Refill    levothyroxine 25 mcg tablet Take 25 mcg by mouth daily      lidocaine (XYLOCAINE) 5 % ointment Apply topically as needed for mild pain 35 44 g 2    meloxicam (MOBIC) 15 mg tablet Take 1 tablet (15 mg total) by mouth daily (Patient not taking: Reported on 1/22/2020) 30 tablet 0    omeprazole (PRILOSEC) 40 MG capsule Take 40 mg by mouth daily       No current facility-administered medications on file prior to visit          No Known Allergies     Tobacco Use: Medium Risk    Smoking Tobacco Use: Former Smoker    Smokeless Tobacco Use: Never Used        Social Determinants of Health     Tobacco Use: Medium Risk    Smoking Tobacco Use: Former Smoker    Smokeless Tobacco Use: Never Used   Alcohol Use: Not on file   Financial Resource Strain: Not on file Food Insecurity: Not on file   Transportation Needs: Not on file   Physical Activity: Not on file   Stress: Not on file   Social Connections: Not on file   Intimate Partner Violence: Not on file   Depression: Not on file   Housing Stability: Not on file               Review of Systems   Constitutional: Negative for chills and fever  HENT: Negative for ear pain and sore throat  Eyes: Negative for pain and visual disturbance  Respiratory: Negative for cough and shortness of breath  Cardiovascular: Negative for chest pain and palpitations  Gastrointestinal: Negative for abdominal pain and vomiting  Genitourinary: Negative for dysuria and hematuria  Musculoskeletal: Negative for arthralgias and back pain  Skin: Negative for color change and rash  Neurological: Negative for seizures and syncope  All other systems reviewed and are negative  There is no height or weight on file to calculate BMI  Physical Exam  Vitals and nursing note reviewed  Constitutional:       Appearance: He is well-developed  HENT:      Head: Normocephalic and atraumatic  Eyes:      Conjunctiva/sclera: Conjunctivae normal    Cardiovascular:      Rate and Rhythm: Normal rate and regular rhythm  Heart sounds: No murmur heard  Pulmonary:      Effort: Pulmonary effort is normal  No respiratory distress  Breath sounds: Normal breath sounds  Abdominal:      Palpations: Abdomen is soft  Tenderness: There is no abdominal tenderness  Musculoskeletal:      Cervical back: Neck supple  Skin:     General: Skin is warm and dry  Neurological:      Mental Status: He is alert  Ortho Exam:    Body part: left knee    Inspection:  No effusion  No deformity  Palpation:  Tender to palpation over the medial joint line  Range of motion:  Full range of right knee motion with some discomfort in terminal flexion  Special Tests:  Positive medial Amelia's  Negative lateral Amelia's    Mild discomfort without laxity with the valgus stress testing  Negative varus stress test   Negative anterior and posterior drawer test   Negative Lachman  Distal Neurovascular Status: Intact, Yes    Procedures       Assessment:     Diagnosis ICD-10-CM Associated Orders   1  Other tear of medial meniscus, current injury, right knee, initial encounter  S83 [id number] Ambulatory Referral to Orthopedic Surgery        Plan:    I explained my current clinical findings and reviewed right knee MRI findings with Kasi Khan today  Unfortunately, he has not had much benefit from conservative management including intra-articular cortisone injection and physical therapy for his right knee pain  His MRI is consistent with a complex medial meniscus tear in the presence of underlying medial compartment osteoarthritis  At this time I will refer him to my orthopedic colleague for consideration of potential knee arthroscopy and meniscectomy  In the interim, the patient is suggested to continue with right knee range of motion exercises  Portions of the record may have been created with voice recognition software  Occasional wrong word or "sound alike" substitutions may have occurred due to the inherent limitations of voice recognition software  Please review the chart carefully and recognize, using context, where substitutions/typographical errors may have occurred

## 2022-03-08 ENCOUNTER — OFFICE VISIT (OUTPATIENT)
Dept: OBGYN CLINIC | Facility: OTHER | Age: 47
End: 2022-03-08
Payer: COMMERCIAL

## 2022-03-08 VITALS
DIASTOLIC BLOOD PRESSURE: 82 MMHG | BODY MASS INDEX: 32.53 KG/M2 | HEART RATE: 89 BPM | WEIGHT: 240.2 LBS | HEIGHT: 72 IN | SYSTOLIC BLOOD PRESSURE: 123 MMHG

## 2022-03-08 DIAGNOSIS — S83.241A OTHER TEAR OF MEDIAL MENISCUS, CURRENT INJURY, RIGHT KNEE, INITIAL ENCOUNTER: Primary | ICD-10-CM

## 2022-03-08 PROCEDURE — 99213 OFFICE O/P EST LOW 20 MIN: CPT | Performed by: ORTHOPAEDIC SURGERY

## 2022-03-12 ENCOUNTER — APPOINTMENT (OUTPATIENT)
Dept: RADIOLOGY | Facility: MEDICAL CENTER | Age: 47
End: 2022-03-12
Attending: PHYSICIAN ASSISTANT
Payer: COMMERCIAL

## 2022-03-12 ENCOUNTER — OFFICE VISIT (OUTPATIENT)
Dept: URGENT CARE | Facility: MEDICAL CENTER | Age: 47
End: 2022-03-12
Payer: COMMERCIAL

## 2022-03-12 VITALS
HEART RATE: 105 BPM | OXYGEN SATURATION: 100 % | BODY MASS INDEX: 32.51 KG/M2 | RESPIRATION RATE: 20 BRPM | HEIGHT: 72 IN | WEIGHT: 240 LBS | TEMPERATURE: 97.9 F

## 2022-03-12 DIAGNOSIS — B34.9 ACUTE VIRAL SYNDROME: ICD-10-CM

## 2022-03-12 DIAGNOSIS — R05.3 PERSISTENT COUGH: Primary | ICD-10-CM

## 2022-03-12 DIAGNOSIS — R05.3 PERSISTENT COUGH: ICD-10-CM

## 2022-03-12 PROCEDURE — S9088 SERVICES PROVIDED IN URGENT: HCPCS | Performed by: PHYSICIAN ASSISTANT

## 2022-03-12 PROCEDURE — 71046 X-RAY EXAM CHEST 2 VIEWS: CPT

## 2022-03-12 PROCEDURE — 87636 SARSCOV2 & INF A&B AMP PRB: CPT | Performed by: PHYSICIAN ASSISTANT

## 2022-03-12 PROCEDURE — 99213 OFFICE O/P EST LOW 20 MIN: CPT | Performed by: PHYSICIAN ASSISTANT

## 2022-03-12 RX ORDER — ALBUTEROL SULFATE 90 UG/1
2 AEROSOL, METERED RESPIRATORY (INHALATION) EVERY 6 HOURS PRN
Qty: 18 G | Refills: 0 | Status: SHIPPED | OUTPATIENT
Start: 2022-03-12 | End: 2022-03-19

## 2022-03-12 NOTE — PROGRESS NOTES
[facility]        NAME: Jenniffer Holloway is a 55 y o  male  : 1975    MRN: [id number]  DATE: 2022  TIME: 1:54 PM    Assessment and Plan   Persistent cough [R05 3]  1  Persistent cough  XR chest pa & lateral    Covid19 and INFLUENZA A/B PCR    Covid19 and INFLUENZA A/B PCR    albuterol (Ventolin HFA) 90 mcg/act inhaler    CANCELED: Covid19 and INFLUENZA A/B PCR    CANCELED: XR chest pa & lateral    CANCELED: Covid19 and INFLUENZA A/B PCR    CANCELED: Covid/Flu-Office Collect   2  Acute viral syndrome  Covid19 and INFLUENZA A/B PCR    Covid19 and INFLUENZA A/B PCR    albuterol (Ventolin HFA) 90 mcg/act inhaler    CANCELED: Covid19 and INFLUENZA A/B PCR    CANCELED: XR chest pa & lateral    CANCELED: Covid19 and INFLUENZA A/B PCR         Patient Instructions     1  Motrin for fever/discomfort  2  Increase fluids  3  Use Ventolin 2 puffs every 8 hours until cough free  4  Follow up with PCP in 3-5 days if symptoms persist       Chief Complaint     Chief Complaint   Patient presents with    Cough     cough, headache, fatigue, congestion, shortness of breath x1 week; unvaccinated          History of Present Illness       Curtis Reyna is a 31-year-old male presents with a one-week history of shortness of breath, cough, fatigue and left-sided lower chest discomfort  Patient reports symptoms started with nasal discharge in fatigue followed by increasing cough that worsened over the past several days  Patient reports his cough is nonproductive, he has had some shortness of breath left-sided lower chest discomfort  Patient denies any fever, chills, body aches, vomiting or diarrhea since the onset of his symptoms  He has not vaccine for COVID-19 or influenza  Cough  Associated symptoms include postnasal drip, rhinorrhea and shortness of breath  Pertinent negatives include no chest pain  Review of Systems   Review of Systems   Constitutional: Negative      HENT: Positive for congestion, postnasal drip and rhinorrhea  Respiratory: Positive for cough and shortness of breath  Cardiovascular: Negative for chest pain  Gastrointestinal: Negative  Current Medications       Current Outpatient Medications:     omeprazole (PRILOSEC) 40 MG capsule, Take 40 mg by mouth daily, Disp: , Rfl:     albuterol (Ventolin HFA) 90 mcg/act inhaler, Inhale 2 puffs every 6 (six) hours as needed for shortness of breath for up to 7 days, Disp: 18 g, Rfl: 0    levothyroxine 25 mcg tablet, Take 25 mcg by mouth daily, Disp: , Rfl:     lidocaine (XYLOCAINE) 5 % ointment, Apply topically as needed for mild pain, Disp: 35 44 g, Rfl: 2    meloxicam (MOBIC) 15 mg tablet, Take 1 tablet (15 mg total) by mouth daily (Patient not taking: Reported on 1/22/2020), Disp: 30 tablet, Rfl: 0    Current Allergies     Allergies as of 03/12/2022    (No Known Allergies)            The following portions of the patient's history were reviewed and updated as appropriate: allergies, current medications, past family history, past medical history, past social history, past surgical history and problem list      Past Medical History:   Diagnosis Date    GERD (gastroesophageal reflux disease)     Hypertension        Past Surgical History:   Procedure Laterality Date    KNEE SURGERY Bilateral     torn ligament        Family History   Problem Relation Age of Onset    No Known Problems Mother     SELINA disease Father          Medications have been verified  Objective   Pulse 105   Temp 97 9 °F (36 6 °C)   Resp 20   Ht 6' (1 829 m)   Wt 109 kg (240 lb)   SpO2 100%   BMI 32 55 kg/m²   No LMP for male patient  Physical Exam     Physical Exam  Constitutional:       General: He is not in acute distress  Appearance: Normal appearance  He is not ill-appearing  HENT:      Head: Normocephalic and atraumatic        Right Ear: Tympanic membrane and ear canal normal       Left Ear: Tympanic membrane and ear canal normal  Nose: Congestion and rhinorrhea present  Rhinorrhea is clear  Mouth/Throat:      Lips: Pink  Pharynx: Oropharynx is clear  Cardiovascular:      Rate and Rhythm: Normal rate and regular rhythm  Heart sounds: Normal heart sounds, S1 normal and S2 normal  No murmur heard  Pulmonary:      Breath sounds: Normal air entry  Examination of the left-lower field reveals rhonchi and rales  Rhonchi and rales present  Comments: There is some scattered crackles and rales in the left lower base  No wheezing  Neurological:      Mental Status: He is alert  Your chest x-ray results with patient, no visible consolidations, final results to be confirmed by radiology

## 2022-03-12 NOTE — PROGRESS NOTES
[facility]        NAME: Ashley Ward is a 55 y o  male  : 1975    MRN: [id number]  DATE: 2022  TIME: 1:59 PM    Assessment and Plan   Persistent cough [R05 3]  1  Persistent cough  XR chest pa & lateral    Covid19 and INFLUENZA A/B PCR    Covid19 and INFLUENZA A/B PCR    albuterol (Ventolin HFA) 90 mcg/act inhaler    CANCELED: Covid19 and INFLUENZA A/B PCR    CANCELED: XR chest pa & lateral    CANCELED: Covid19 and INFLUENZA A/B PCR    CANCELED: Covid/Flu-Office Collect   2  Acute viral syndrome  Covid19 and INFLUENZA A/B PCR    Covid19 and INFLUENZA A/B PCR    albuterol (Ventolin HFA) 90 mcg/act inhaler    CANCELED: Covid19 and INFLUENZA A/B PCR    CANCELED: XR chest pa & lateral    CANCELED: Covid19 and INFLUENZA A/B PCR         Patient Instructions     1  Motrin for fever/discomfort  2  Increase fluids  3  Use Ventolin 2 puffs every 8 hours until cough free  4  Follow up with PCP in 3-5 days if symptoms persist       Chief Complaint     Chief Complaint   Patient presents with    Cough     cough, headache, fatigue, congestion, shortness of breath x1 week; unvaccinated          History of Present Illness       Saúl Ellsworth is a 63-year-old male presents with a one-week history of shortness of breath, cough, fatigue and left-sided lower chest discomfort  Patient reports symptoms started with nasal discharge in fatigue followed by increasing cough that worsened over the past several days  Patient reports his cough is nonproductive, he has had some shortness of breath left-sided lower chest discomfort  Patient denies any fever, chills, body aches, vomiting or diarrhea since the onset of his symptoms  He has not vaccine for COVID-19 or influenza  Cough  Associated symptoms include postnasal drip, rhinorrhea and shortness of breath  Pertinent negatives include no chest pain  Review of Systems   Review of Systems   Constitutional: Negative      HENT: Positive for congestion, postnasal drip and rhinorrhea  Respiratory: Positive for cough and shortness of breath  Cardiovascular: Negative for chest pain  Gastrointestinal: Negative  Current Medications       Current Outpatient Medications:     omeprazole (PRILOSEC) 40 MG capsule, Take 40 mg by mouth daily, Disp: , Rfl:     albuterol (Ventolin HFA) 90 mcg/act inhaler, Inhale 2 puffs every 6 (six) hours as needed for shortness of breath for up to 7 days, Disp: 18 g, Rfl: 0    levothyroxine 25 mcg tablet, Take 25 mcg by mouth daily, Disp: , Rfl:     lidocaine (XYLOCAINE) 5 % ointment, Apply topically as needed for mild pain, Disp: 35 44 g, Rfl: 2    meloxicam (MOBIC) 15 mg tablet, Take 1 tablet (15 mg total) by mouth daily (Patient not taking: Reported on 1/22/2020), Disp: 30 tablet, Rfl: 0    Current Allergies     Allergies as of 03/12/2022    (No Known Allergies)            The following portions of the patient's history were reviewed and updated as appropriate: allergies, current medications, past family history, past medical history, past social history, past surgical history and problem list      Past Medical History:   Diagnosis Date    GERD (gastroesophageal reflux disease)     Hypertension        Past Surgical History:   Procedure Laterality Date    KNEE SURGERY Bilateral     torn ligament        Family History   Problem Relation Age of Onset    No Known Problems Mother     SELINA disease Father          Medications have been verified  Objective   Pulse 105   Temp 97 9 °F (36 6 °C)   Resp 20   Ht 6' (1 829 m)   Wt 109 kg (240 lb)   SpO2 100%   BMI 32 55 kg/m²   No LMP for male patient  Physical Exam     Physical Exam  Constitutional:       General: He is not in acute distress  Appearance: Normal appearance  He is not ill-appearing  HENT:      Head: Normocephalic and atraumatic        Right Ear: Tympanic membrane and ear canal normal       Left Ear: Tympanic membrane and ear canal normal  Nose: Congestion and rhinorrhea present  Rhinorrhea is clear  Mouth/Throat:      Lips: Pink  Pharynx: Oropharynx is clear  Cardiovascular:      Rate and Rhythm: Normal rate and regular rhythm  Heart sounds: Normal heart sounds, S1 normal and S2 normal  No murmur heard  Pulmonary:      Breath sounds: Normal air entry  Examination of the left-lower field reveals rhonchi and rales  Rhonchi and rales present  Comments: There is some scattered crackles and rales in the left lower base  No wheezing  Neurological:      Mental Status: He is alert  Your chest x-ray results with patient, no visible consolidations, final results to be confirmed by radiology

## 2022-03-12 NOTE — PATIENT INSTRUCTIONS
1  Motrin for fever/discomfort  2  Increase fluids  3  Use Ventolin 2 puffs every 8 hours until cough free  4   Follow up with PCP in 3-5 days if symptoms persist

## 2022-03-13 ENCOUNTER — TELEPHONE (OUTPATIENT)
Dept: URGENT CARE | Facility: MEDICAL CENTER | Age: 47
End: 2022-03-13

## 2022-03-13 LAB
FLUAV RNA RESP QL NAA+PROBE: POSITIVE
FLUBV RNA RESP QL NAA+PROBE: NEGATIVE
SARS-COV-2 RNA RESP QL NAA+PROBE: NEGATIVE

## 2022-03-13 NOTE — TELEPHONE ENCOUNTER
Message on patient's cellphone number notifying of positive results for influenza A  Advised patient follow-up with any increasing or worsening symptoms

## 2022-03-14 ENCOUNTER — OFFICE VISIT (OUTPATIENT)
Dept: OBGYN CLINIC | Facility: CLINIC | Age: 47
End: 2022-03-14
Payer: COMMERCIAL

## 2022-03-14 VITALS
SYSTOLIC BLOOD PRESSURE: 134 MMHG | HEIGHT: 72 IN | BODY MASS INDEX: 33.35 KG/M2 | WEIGHT: 246.2 LBS | HEART RATE: 92 BPM | DIASTOLIC BLOOD PRESSURE: 87 MMHG

## 2022-03-14 DIAGNOSIS — S83.231A COMPLEX TEAR OF MEDIAL MENISCUS OF RIGHT KNEE AS CURRENT INJURY, INITIAL ENCOUNTER: Primary | ICD-10-CM

## 2022-03-14 PROCEDURE — 99213 OFFICE O/P EST LOW 20 MIN: CPT | Performed by: ORTHOPAEDIC SURGERY

## 2022-03-14 RX ORDER — DOXYCYCLINE HYCLATE 100 MG/1
CAPSULE ORAL
COMMUNITY
Start: 2022-03-12 | End: 2022-04-01

## 2022-03-14 NOTE — PROGRESS NOTES
Patient Name:  Ashley Kong  MRN:  [id number]    [address] [place], [place] [address]way     1  Complex tear of medial meniscus of right knee as current injury, initial encounter        55 y o  male with Right knee medial meniscus tear  X-rays and MRI reviewed in office today with patient  In depth conversation had with patient regarding nonoperative vs surgical management of Right knee medial meniscus tear  Nonoperative management consisting of getting back to normal duties at work, possible outpatient PT, topical/oral analgesics as needed for pain relief  If pain persists despite nonoperative management, can consider surgical management consisting of Right knee arthroscopic partial medial menisectomy, all associated procedures  At this time, patient nonpainful and unable to elicit pain during physical exam  Overall, patient is mildly improved from recent bedrest from cold  Patient to hold off on surgical management at this time  Advised patient to get back into normal duties at work and see if symptoms persist  Can consider surgery at that time if persistent swelling, pain, decreased range of motion, pain with ambulation or certain movements  Verbalized understanding of the above and will follow up in office in 4 weeks for reevaluation but should call the office if symptoms worsen in the interim  Chief Complaint     Right knee pain    History of the Present Illness     Ashley Kong is a 55 y o  male with Right knee pain s/p fall into a wall while roller blading in attempt to avoid another person  He states Left knee started to swell up after injury, but all symptoms have resolved  He noted Right knee pain afterwards with swelling and medial knee pain  Patient did seek out medical care with Dr Joey Rebolledo where corticosteroid injection was performed on 01/15/2022; advised patient to follow up in office with Dr Luna Mondragon  MRI was ordered at that follow up demonstrating Right medial meniscus tear   Today, patient reports pain improved secondary to being "in bed all day since last Friday" with a cold  He denies continued pain, swelling, locking of the Right knee since relaxing  Review of Systems     Review of Systems   Constitutional: Negative for chills and fever  HENT: Negative for ear pain and sore throat  Eyes: Negative for pain and visual disturbance  Respiratory: Negative for cough and shortness of breath  Cardiovascular: Negative for chest pain and palpitations  Gastrointestinal: Negative for abdominal pain and vomiting  Genitourinary: Negative for dysuria and hematuria  Musculoskeletal: Negative for arthralgias and back pain  Skin: Negative for color change and rash  Neurological: Negative for seizures and syncope  All other systems reviewed and are negative  Physical Exam     /87   Pulse 92   Ht 6' (1 829 m)   Wt 112 kg (246 lb 3 2 oz)   BMI 33 39 kg/m²     Right Knee  Range of motion from 0 to 125  There is no crepitus with range of motion  There is no effusion  There is tenderness over the medial joint line  There is 5/5 quadriceps strength and preserved tone  The patient is able to perform a straight leg raise  negative patellar grind test   Anterior drawer tests is negative  negative Lachman Test    Posterior drawer test is   negative   Varus stress testing reveals no pain or laxity at 0 and 30 degrees   Valgus stress testing reveals no pain or laxity at 0 and 30 degrees  Amelia's testing is negative   Negative thessaly  Mild dynamic knee valgus with mini body squat  The patient is neurovascular intact distally  Eyes:  Anicteric sclerae  Neck:  Supple  Lungs:  Normal respiratory effort  Cardiovascular:  Capillary refill is less than 2 seconds  Skin:  Intact without erythema  Neurologic:  Sensation grossly intact to light touch  Psychiatric:  Mood and affect are appropriate      Data Review     I have personally reviewed pertinent films in PACS, and my interpretation follows:    MRI performed of Right knee demonstrates complex posteriod medial meniscus tear without flipped fragment appreciated  X-rays taken demonstrates well maintained joint spaces  No acute fracture or dislocation  Past Medical History:   Diagnosis Date    GERD (gastroesophageal reflux disease)     Hypertension        Past Surgical History:   Procedure Laterality Date    KNEE SURGERY Bilateral     torn ligament        No Known Allergies    Current Outpatient Medications on File Prior to Visit   Medication Sig Dispense Refill    albuterol (Ventolin HFA) 90 mcg/act inhaler Inhale 2 puffs every 6 (six) hours as needed for shortness of breath for up to 7 days 18 g 0    doxycycline hyclate (VIBRAMYCIN) 100 mg capsule       lidocaine (XYLOCAINE) 5 % ointment Apply topically as needed for mild pain 35 44 g 2    omeprazole (PRILOSEC) 40 MG capsule Take 40 mg by mouth daily      [DISCONTINUED] levothyroxine 25 mcg tablet Take 25 mcg by mouth daily      [DISCONTINUED] meloxicam (MOBIC) 15 mg tablet Take 1 tablet (15 mg total) by mouth daily (Patient not taking: Reported on 1/22/2020) 30 tablet 0     No current facility-administered medications on file prior to visit  Social History     Tobacco Use    Smoking status: Former Smoker    Smokeless tobacco: Never Used    Tobacco comment: quit almost 3 years ago      Vaping Use    Vaping Use: Never used   Substance Use Topics    Alcohol use: Yes     Comment: occasional    Drug use: No       Family History   Problem Relation Age of Onset    No Known Problems Mother     SELINA disease Father              Procedures Performed     Procedures  None       Alysha Asif PA-C

## 2022-03-24 ENCOUNTER — HOSPITAL ENCOUNTER (OUTPATIENT)
Dept: RADIOLOGY | Facility: HOSPITAL | Age: 47
Discharge: HOME | End: 2022-03-24
Payer: COMMERCIAL

## 2022-03-24 ENCOUNTER — OFFICE VISIT (OUTPATIENT)
Dept: LAB | Facility: HOSPITAL | Age: 47
End: 2022-03-24
Payer: COMMERCIAL

## 2022-03-24 ENCOUNTER — APPOINTMENT (OUTPATIENT)
Dept: LAB | Facility: HOSPITAL | Age: 47
End: 2022-03-24
Payer: COMMERCIAL

## 2022-03-24 ENCOUNTER — OFFICE VISIT (OUTPATIENT)
Dept: OBGYN CLINIC | Facility: CLINIC | Age: 47
End: 2022-03-24
Payer: COMMERCIAL

## 2022-03-24 VITALS
HEIGHT: 72 IN | HEART RATE: 98 BPM | DIASTOLIC BLOOD PRESSURE: 84 MMHG | BODY MASS INDEX: 33.08 KG/M2 | SYSTOLIC BLOOD PRESSURE: 139 MMHG | WEIGHT: 244.2 LBS

## 2022-03-24 DIAGNOSIS — S83.231A COMPLEX TEAR OF MEDIAL MENISCUS OF RIGHT KNEE AS CURRENT INJURY, INITIAL ENCOUNTER: ICD-10-CM

## 2022-03-24 DIAGNOSIS — S83.231A COMPLEX TEAR OF MEDIAL MENISCUS OF RIGHT KNEE AS CURRENT INJURY, INITIAL ENCOUNTER: Primary | ICD-10-CM

## 2022-03-24 DIAGNOSIS — Z01.818 PRE-OP TESTING: Primary | ICD-10-CM

## 2022-03-24 DIAGNOSIS — Z01.818 PRE-OP TESTING: ICD-10-CM

## 2022-03-24 LAB
ALBUMIN SERPL BCP-MCNC: 3.9 G/DL (ref 3.5–5)
ALP SERPL-CCNC: 55 U/L (ref 46–116)
ALT SERPL W P-5'-P-CCNC: 50 U/L (ref 12–78)
ANION GAP SERPL CALCULATED.3IONS-SCNC: 8 MMOL/L (ref 4–13)
AST SERPL W P-5'-P-CCNC: 28 U/L (ref 5–45)
BASOPHILS # BLD AUTO: 0.03 THOUSANDS/ΜL (ref 0–0.1)
BASOPHILS NFR BLD AUTO: 1 % (ref 0–1)
BILIRUB SERPL-MCNC: 0.6 MG/DL (ref 0.2–1)
BUN SERPL-MCNC: 18 MG/DL (ref 5–25)
CALCIUM SERPL-MCNC: 8.8 MG/DL (ref 8.3–10.1)
CHLORIDE SERPL-SCNC: 106 MMOL/L (ref 100–108)
CO2 SERPL-SCNC: 28 MMOL/L (ref 21–32)
CREAT SERPL-MCNC: 0.94 MG/DL (ref 0.6–1.3)
EOSINOPHIL # BLD AUTO: 0.1 THOUSAND/ΜL (ref 0–0.61)
EOSINOPHIL NFR BLD AUTO: 2 % (ref 0–6)
ERYTHROCYTE [DISTWIDTH] IN BLOOD BY AUTOMATED COUNT: 13.6 % (ref 11.6–15.1)
GFR SERPL CREATININE-BSD FRML MDRD: 96 ML/MIN/1.73SQ M
GLUCOSE P FAST SERPL-MCNC: 97 MG/DL (ref 65–99)
HCT VFR BLD AUTO: 40.5 % (ref 36.5–49.3)
HGB BLD-MCNC: 13 G/DL (ref 12–17)
IMM GRANULOCYTES # BLD AUTO: 0.02 THOUSAND/UL (ref 0–0.2)
IMM GRANULOCYTES NFR BLD AUTO: 0 % (ref 0–2)
LYMPHOCYTES # BLD AUTO: 2.14 THOUSANDS/ΜL (ref 0.6–4.47)
LYMPHOCYTES NFR BLD AUTO: 33 % (ref 14–44)
MCH RBC QN AUTO: 29.3 PG (ref 26.8–34.3)
MCHC RBC AUTO-ENTMCNC: 32.1 G/DL (ref 31.4–37.4)
MCV RBC AUTO: 91 FL (ref 82–98)
MONOCYTES # BLD AUTO: 0.49 THOUSAND/ΜL (ref 0.17–1.22)
MONOCYTES NFR BLD AUTO: 7 % (ref 4–12)
NEUTROPHILS # BLD AUTO: 3.8 THOUSANDS/ΜL (ref 1.85–7.62)
NEUTS SEG NFR BLD AUTO: 57 % (ref 43–75)
NRBC BLD AUTO-RTO: 0 /100 WBCS
PLATELET # BLD AUTO: 243 THOUSANDS/UL (ref 149–390)
PMV BLD AUTO: 11.4 FL (ref 8.9–12.7)
POTASSIUM SERPL-SCNC: 4 MMOL/L (ref 3.5–5.3)
PROT SERPL-MCNC: 7.9 G/DL (ref 6.4–8.2)
RBC # BLD AUTO: 4.43 MILLION/UL (ref 3.88–5.62)
SODIUM SERPL-SCNC: 142 MMOL/L (ref 136–145)
WBC # BLD AUTO: 6.58 THOUSAND/UL (ref 4.31–10.16)

## 2022-03-24 PROCEDURE — 36415 COLL VENOUS BLD VENIPUNCTURE: CPT

## 2022-03-24 PROCEDURE — 93005 ELECTROCARDIOGRAM TRACING: CPT

## 2022-03-24 PROCEDURE — 80053 COMPREHEN METABOLIC PANEL: CPT

## 2022-03-24 PROCEDURE — 85025 COMPLETE CBC W/AUTO DIFF WBC: CPT

## 2022-03-24 PROCEDURE — 99214 OFFICE O/P EST MOD 30 MIN: CPT | Performed by: ORTHOPAEDIC SURGERY

## 2022-03-24 PROCEDURE — 71046 X-RAY EXAM CHEST 2 VIEWS: CPT

## 2022-03-24 RX ORDER — CHLORHEXIDINE GLUCONATE 0.12 MG/ML
15 RINSE ORAL ONCE
Status: CANCELLED | OUTPATIENT
Start: 2022-03-24 | End: 2022-03-24

## 2022-03-24 RX ORDER — CHLORHEXIDINE GLUCONATE 4 G/100ML
SOLUTION TOPICAL DAILY PRN
Status: CANCELLED | OUTPATIENT
Start: 2022-03-24

## 2022-03-24 NOTE — PROGRESS NOTES
Patient Name:  Tera Keita  MRN:  [id number]    [address] [place], [place] [address]way     1  Complex tear of medial meniscus of right knee as current injury, initial encounter  -     [facility]; Future  -     Comprehensive metabolic panel; Future  -     CBC and differential; Future  -     EKG 12 lead; Future  -     XR chest pa & lateral; Future; Expected date: 03/24/2022  -     Case request operating room: ARTHROSCOPY KNEE- Right Knee partial medial menisectomy, all associated procedures; Standing  -     Case request operating room: ARTHROSCOPY KNEE- Right Knee partial medial menisectomy, all associated procedures        55 y o  male with Right knee medial meniscus tear  Again discussed nonoperative vs surgical management of Right knee medial meniscus tear and patient would like to move forward with surgical intervention  He has tried injection therapy, physician guided home exercise program, outpatient PT, and oral analgesics without pain relief  Risks of surgical intervention discussed with patient including continued pain, infection, injury to surroundin structures, need for additional procedure, stiffness, failure of repair if indicated  Patient  Verbalized understanding of the above and detailed consent obtained  Patient will go to OR likely 04/01/2022 for Right knee arthroscopic partial medial menisectomy versus repair, all associated procedures  History of the Present Illness   Tera Keita is a 55 y o  male with Right knee meniscus tear  Today, patient reports mild exacerbation of pain since last appointment  Continues to locate medial knee pain with increased function throughout the day  He notes pain with deep knee flexion and increased standing while at work  He would like to move forward with surgical intervention  Review of Systems     Review of Systems   Constitutional: Negative for chills and fever  HENT: Negative for ear pain and sore throat      Eyes: Negative for pain and visual disturbance  Respiratory: Negative for cough and shortness of breath  Cardiovascular: Negative for chest pain and palpitations  Gastrointestinal: Negative for abdominal pain and vomiting  Genitourinary: Negative for dysuria and hematuria  Musculoskeletal: Negative for arthralgias and back pain  Skin: Negative for color change and rash  Neurological: Negative for seizures and syncope  All other systems reviewed and are negative  Physical Exam     /84   Pulse 98   Ht 6' (1 829 m)   Wt 111 kg (244 lb 3 2 oz)   BMI 33 12 kg/m²     Right Knee  Range of motion from 0 to 125 with pain at end range flexion  There is trace effusion  There is tenderness over the medial and posteromedial joint line  The patient is ableperform a straight leg raise  Varus stress testing reveals no pain or laxity at 0 and 30 degrees   Valgus stress testing reveals no pain or laxity at 0 and 30 degrees  Amelia test positive medially  The patient is neurovascular intact distally  Data Review     I have personally reviewed pertinent films in PACS, and my interpretation follows  MRI of right knee reveals complex tear of medial meniscus with parrot-beak component involving body and posterior horn  Social History     Tobacco Use    Smoking status: Former Smoker    Smokeless tobacco: Never Used    Tobacco comment: quit almost 3 years ago      Vaping Use    Vaping Use: Never used   Substance Use Topics    Alcohol use: Yes     Comment: occasional    Drug use: No           Procedures  None     Juany Plata,

## 2022-03-24 NOTE — H&P (VIEW-ONLY)
Patient Name:  Dominique Godwin  MRN:  [id number]    [address] [place], [place] [address]way     1  Complex tear of medial meniscus of right knee as current injury, initial encounter  -     Ambulatory referral to [facility]; Future  -     Comprehensive metabolic panel; Future  -     CBC and differential; Future  -     EKG 12 lead; Future  -     XR chest pa & lateral; Future; Expected date: 03/24/2022  -     Case request operating room: ARTHROSCOPY KNEE- Right Knee partial medial menisectomy, all associated procedures; Standing  -     Case request operating room: ARTHROSCOPY KNEE- Right Knee partial medial menisectomy, all associated procedures        55 y o  male with Right knee medial meniscus tear  Again discussed nonoperative vs surgical management of Right knee medial meniscus tear and patient would like to move forward with surgical intervention  He has tried injection therapy, physician guided home exercise program, outpatient PT, and oral analgesics without pain relief  Risks of surgical intervention discussed with patient including continued pain, infection, injury to surroundin structures, need for additional procedure, stiffness, failure of repair if indicated  Patient  Verbalized understanding of the above and detailed consent obtained  Patient will go to OR likely 04/01/2022 for Right knee arthroscopic partial medial menisectomy versus repair, all associated procedures  History of the Present Illness   Dominique Godwin is a 55 y o  male with Right knee meniscus tear  Today, patient reports mild exacerbation of pain since last appointment  Continues to locate medial knee pain with increased function throughout the day  He notes pain with deep knee flexion and increased standing while at work  He would like to move forward with surgical intervention  Review of Systems     Review of Systems   Constitutional: Negative for chills and fever  HENT: Negative for ear pain and sore throat      Eyes: Negative for pain and visual disturbance  Respiratory: Negative for cough and shortness of breath  Cardiovascular: Negative for chest pain and palpitations  Gastrointestinal: Negative for abdominal pain and vomiting  Genitourinary: Negative for dysuria and hematuria  Musculoskeletal: Negative for arthralgias and back pain  Skin: Negative for color change and rash  Neurological: Negative for seizures and syncope  All other systems reviewed and are negative  Physical Exam     /84   Pulse 98   Ht 6' (1 829 m)   Wt 111 kg (244 lb 3 2 oz)   BMI 33 12 kg/m²     Right Knee  Range of motion from 0 to 125 with pain at end range flexion  There is trace effusion  There is tenderness over the medial and posteromedial joint line  The patient is ableperform a straight leg raise  Varus stress testing reveals no pain or laxity at 0 and 30 degrees   Valgus stress testing reveals no pain or laxity at 0 and 30 degrees  Amelia test positive medially  The patient is neurovascular intact distally  Data Review     I have personally reviewed pertinent films in PACS, and my interpretation follows  MRI of right knee reveals complex tear of medial meniscus with parrot-beak component involving body and posterior horn  Social History     Tobacco Use    Smoking status: Former Smoker    Smokeless tobacco: Never Used    Tobacco comment: quit almost 3 years ago      Vaping Use    Vaping Use: Never used   Substance Use Topics    Alcohol use: Yes     Comment: occasional    Drug use: No           Procedures  None     Adelaide Cease, DO

## 2022-03-26 LAB
ATRIAL RATE: 72 BPM
P AXIS: 35 DEGREES
PR INTERVAL: 180 MS
QRS AXIS: 29 DEGREES
QRSD INTERVAL: 90 MS
QT INTERVAL: 374 MS
QTC INTERVAL: 409 MS
T WAVE AXIS: 17 DEGREES
VENTRICULAR RATE: 72 BPM

## 2022-03-26 PROCEDURE — 93010 ELECTROCARDIOGRAM REPORT: CPT | Performed by: INTERNAL MEDICINE

## 2022-03-28 ENCOUNTER — TELEPHONE (OUTPATIENT)
Dept: OBGYN CLINIC | Facility: CLINIC | Age: 47
End: 2022-03-28

## 2022-03-28 NOTE — TELEPHONE ENCOUNTER
Faxed clearance form with Lab results, xray results and EKG results to patient PCP office at [phone]

## 2022-03-28 NOTE — TELEPHONE ENCOUNTER
Just received a call from patient he states he cannot do surgery on 4/1/22 he would like to proceed with 4/8/22 surgery date put request in for patient surgery to be moved to 4/8/22

## 2022-03-30 NOTE — TELEPHONE ENCOUNTER
Patient was scheduled for PCP clearance on 3/28/22 called pt to see if he made it to his appointment patient states appointment was moved to 03/31/22 at 8:45am

## 2022-04-01 NOTE — PRE-PROCEDURE INSTRUCTIONS
Pre-Surgery Instructions:   Medication Instructions    omeprazole (PRILOSEC) 40 MG capsule Take DOS        NPO after midnight, meds in am with sips of water  Understands when to expect preop phone call  Remove all jewelry  Advised of visitation policy  Before your operation, you play an important role in decreasing your risk for infection by washing with special antiseptic soap  This is an effective way to reduce bacteria on the skin which may help to prevent infections at the surgical site  Please read the following directions in advance  1  In the week before your operation purchase a 4 ounce bottle of antiseptic soap containing chlorhexidine gluconate 4%  Some brand names include: Aplicare, Endure, and Hibiclens  The cost is usually less than $5 00  · For your convenience, the [address] [place] [address] carries the soap  · It may also be available at your doctor's office or pre-admission testing center, and at most retail pharmacies  · If you are allergic or sensitive to soaps containing chlorhexidine gluconate (CHG), please let your doctor know so another antiseptic soap can be suggested  · CHG antiseptic soap is for external use only  2      The day before your operation follow these directions carefully to get ready  · Place clean lines (sheets) on your bed; you should sleep on clean sheets after your evening shower  · Get clean towels and washcloths ready - you need enough for 2 showers  · Set aside clean underwear, pajamas, and clothing to wear after the shower  Reminders:  · DO NOT use any other soap or body rinse on your skin during or after the antiseptic showers  · DO NOT use lotion , powder, deodorant, or perfume/aftershave of any kind on your skin after your antiseptic shower  · DO NOT shave any body parts in the 24 hours/the day before your operation  · DO NOT get the antiseptic soap in your eyes, ears, nose, mouth, or vaginal area      3      You will need to shower the night before AND the morning of your Surgery  Shower 1:  · The evening before your operation, take the fist shower  · First, shampoo your hair with regular shampoo and rinse it completely before you use the anitseptic soap  After washing and rinsing your hair, rinse your body  · Next, use a clean wash cloth to apply the antiseptic soap and wash your body from the neck down to your toes using 1/2 bottle of the antiseptic soap  You will use the other 1/2 bottle for the second shower  · Clean the area where your incision will be; later this area well for about 2 minutes  · If you ar having head or neck surgery, wash areas with the antiseptic soap  · Rinse yourself completely with running water  · Use a clean towel to dry off  · Wear clean underwear and clothing/pajamas  Shower 2:  · The Morning of your operation, take the second shower following the same steps as Shower 1 using the second 1/2 of the bottle of antiseptic soap  · Use clean cloths and towels to was and dry yourself off  · Wear clean underwear and clothing

## 2022-04-07 ENCOUNTER — ANESTHESIA EVENT (OUTPATIENT)
Dept: PERIOP | Facility: HOSPITAL | Age: 47
End: 2022-04-07
Payer: COMMERCIAL

## 2022-04-07 NOTE — ANESTHESIA PREPROCEDURE EVALUATION
Procedure:  ARTHROSCOPY KNEE- Right Knee partial medial menisectomy, all associated procedures (Right Knee)    Relevant Problems   CARDIO   (+) Hypertension      GI/HEPATIC   (+) GERD (gastroesophageal reflux disease)        Physical Exam    Airway    Mallampati score: II  TM Distance: >3 FB  Neck ROM: full     Dental   upper dentures,     Cardiovascular  Rhythm: regular, Rate: normal, Cardiovascular exam normal    Pulmonary  Pulmonary exam normal Breath sounds clear to auscultation,     Other Findings        Anesthesia Plan  ASA Score- 2     Anesthesia Type- general with ASA Monitors  Additional Monitors:   Airway Plan: LMA  Comment: Risks/benefits and alternatives discussed with patient including likely possibility of PONV and sore throat, as well as the rare possibilities of aspiration, dental/oropharyngeal/ocular injuries, or grave/life threatening anesthetic and surgical emergencies          Plan Factors-Exercise tolerance (METS): >4 METS  Patient summary reviewed  Patient instructed to abstain from smoking on day of procedure  Patient did not smoke on day of surgery  Induction- intravenous  Postoperative Plan- Plan for postoperative opioid use  Planned trial extubation    Informed Consent- Anesthetic plan and risks discussed with patient  I personally reviewed this patient with the CRNA  Discussed and agreed on the Anesthesia Plan with the CRNA  Chapin Pozo

## 2022-04-08 ENCOUNTER — HOSPITAL ENCOUNTER (OUTPATIENT)
Facility: HOSPITAL | Age: 47
Setting detail: OUTPATIENT SURGERY
Discharge: HOME | End: 2022-04-08
Attending: ORTHOPAEDIC SURGERY | Admitting: ORTHOPAEDIC SURGERY
Payer: COMMERCIAL

## 2022-04-08 ENCOUNTER — ANESTHESIA (OUTPATIENT)
Dept: PERIOP | Facility: HOSPITAL | Age: 47
End: 2022-04-08
Payer: COMMERCIAL

## 2022-04-08 VITALS
HEART RATE: 76 BPM | BODY MASS INDEX: 33.29 KG/M2 | HEIGHT: 72 IN | DIASTOLIC BLOOD PRESSURE: 77 MMHG | RESPIRATION RATE: 19 BRPM | OXYGEN SATURATION: 96 % | TEMPERATURE: 97.8 F | WEIGHT: 245.81 LBS | SYSTOLIC BLOOD PRESSURE: 116 MMHG

## 2022-04-08 DIAGNOSIS — S83.241D TEAR OF MEDIAL MENISCUS OF RIGHT KNEE, CURRENT, UNSPECIFIED TEAR TYPE, SUBSEQUENT ENCOUNTER: Primary | ICD-10-CM

## 2022-04-08 PROCEDURE — 29881 ARTHRS KNE SRG MNISECTMY M/L: CPT | Performed by: PHYSICIAN ASSISTANT

## 2022-04-08 PROCEDURE — 29881 ARTHRS KNE SRG MNISECTMY M/L: CPT | Performed by: ORTHOPAEDIC SURGERY

## 2022-04-08 RX ORDER — OXYCODONE HYDROCHLORIDE AND ACETAMINOPHEN 5; 325 MG/1; MG/1
1 TABLET ORAL EVERY 4 HOURS PRN
Qty: 10 TABLET | Refills: 0 | Status: SHIPPED | OUTPATIENT
Start: 2022-04-08

## 2022-04-08 RX ORDER — OXYCODONE HYDROCHLORIDE AND ACETAMINOPHEN 5; 325 MG/1; MG/1
1 TABLET ORAL ONCE
Status: DISCONTINUED | OUTPATIENT
Start: 2022-04-08 | End: 2022-04-08 | Stop reason: HOSPADM

## 2022-04-08 RX ORDER — GABAPENTIN 300 MG/1
300 CAPSULE ORAL ONCE
Status: COMPLETED | OUTPATIENT
Start: 2022-04-08 | End: 2022-04-08

## 2022-04-08 RX ORDER — ONDANSETRON 4 MG/1
4 TABLET, ORALLY DISINTEGRATING ORAL EVERY 6 HOURS PRN
Qty: 20 TABLET | Refills: 0 | Status: SHIPPED | OUTPATIENT
Start: 2022-04-08

## 2022-04-08 RX ORDER — CHLORHEXIDINE GLUCONATE 4 G/100ML
SOLUTION TOPICAL DAILY PRN
Status: DISCONTINUED | OUTPATIENT
Start: 2022-04-08 | End: 2022-04-08 | Stop reason: HOSPADM

## 2022-04-08 RX ORDER — KETOROLAC TROMETHAMINE 30 MG/ML
INJECTION, SOLUTION INTRAMUSCULAR; INTRAVENOUS AS NEEDED
Status: DISCONTINUED | OUTPATIENT
Start: 2022-04-08 | End: 2022-04-08

## 2022-04-08 RX ORDER — ACETAMINOPHEN 325 MG/1
975 TABLET ORAL ONCE
Status: COMPLETED | OUTPATIENT
Start: 2022-04-08 | End: 2022-04-08

## 2022-04-08 RX ORDER — CHLORHEXIDINE GLUCONATE 0.12 MG/ML
15 RINSE ORAL ONCE
Status: COMPLETED | OUTPATIENT
Start: 2022-04-08 | End: 2022-04-08

## 2022-04-08 RX ORDER — DEXMEDETOMIDINE HYDROCHLORIDE 100 UG/ML
INJECTION, SOLUTION INTRAVENOUS AS NEEDED
Status: DISCONTINUED | OUTPATIENT
Start: 2022-04-08 | End: 2022-04-08

## 2022-04-08 RX ORDER — IBUPROFEN 600 MG/1
600 TABLET ORAL ONCE
Status: DISCONTINUED | OUTPATIENT
Start: 2022-04-08 | End: 2022-04-08 | Stop reason: HOSPADM

## 2022-04-08 RX ORDER — FENTANYL CITRATE/PF 50 MCG/ML
25 SYRINGE (ML) INJECTION
Status: DISCONTINUED | OUTPATIENT
Start: 2022-04-08 | End: 2022-04-08 | Stop reason: HOSPADM

## 2022-04-08 RX ORDER — BUPIVACAINE HYDROCHLORIDE 5 MG/ML
INJECTION, SOLUTION EPIDURAL; INTRACAUDAL AS NEEDED
Status: DISCONTINUED | OUTPATIENT
Start: 2022-04-08 | End: 2022-04-08 | Stop reason: HOSPADM

## 2022-04-08 RX ORDER — CEFAZOLIN SODIUM 2 G/50ML
2000 SOLUTION INTRAVENOUS ONCE
Status: COMPLETED | OUTPATIENT
Start: 2022-04-08 | End: 2022-04-08

## 2022-04-08 RX ORDER — DEXAMETHASONE SODIUM PHOSPHATE 10 MG/ML
INJECTION, SOLUTION INTRAMUSCULAR; INTRAVENOUS AS NEEDED
Status: DISCONTINUED | OUTPATIENT
Start: 2022-04-08 | End: 2022-04-08

## 2022-04-08 RX ORDER — LIDOCAINE HYDROCHLORIDE 10 MG/ML
0.5 INJECTION, SOLUTION EPIDURAL; INFILTRATION; INTRACAUDAL; PERINEURAL ONCE AS NEEDED
Status: DISCONTINUED | OUTPATIENT
Start: 2022-04-08 | End: 2022-04-08 | Stop reason: HOSPADM

## 2022-04-08 RX ORDER — ASPIRIN 81 MG/1
81 TABLET ORAL 2 TIMES DAILY
Qty: 28 TABLET | Refills: 0 | Status: SHIPPED | OUTPATIENT
Start: 2022-04-08

## 2022-04-08 RX ORDER — ALBUTEROL SULFATE 2.5 MG/3ML
2.5 SOLUTION RESPIRATORY (INHALATION) ONCE
Status: COMPLETED | OUTPATIENT
Start: 2022-04-08 | End: 2022-04-08

## 2022-04-08 RX ORDER — PROMETHAZINE HYDROCHLORIDE 25 MG/ML
25 INJECTION, SOLUTION INTRAMUSCULAR; INTRAVENOUS ONCE AS NEEDED
Status: DISCONTINUED | OUTPATIENT
Start: 2022-04-08 | End: 2022-04-08 | Stop reason: HOSPADM

## 2022-04-08 RX ORDER — ONDANSETRON 2 MG/ML
4 INJECTION INTRAMUSCULAR; INTRAVENOUS EVERY 6 HOURS PRN
Status: DISCONTINUED | OUTPATIENT
Start: 2022-04-08 | End: 2022-04-08 | Stop reason: HOSPADM

## 2022-04-08 RX ORDER — MIDAZOLAM HYDROCHLORIDE 2 MG/2ML
INJECTION, SOLUTION INTRAMUSCULAR; INTRAVENOUS AS NEEDED
Status: DISCONTINUED | OUTPATIENT
Start: 2022-04-08 | End: 2022-04-08

## 2022-04-08 RX ORDER — SODIUM CHLORIDE, SODIUM LACTATE, POTASSIUM CHLORIDE, CALCIUM CHLORIDE 600; 310; 30; 20 MG/100ML; MG/100ML; MG/100ML; MG/100ML
125 INJECTION, SOLUTION INTRAVENOUS CONTINUOUS
Status: DISCONTINUED | OUTPATIENT
Start: 2022-04-08 | End: 2022-04-08 | Stop reason: HOSPADM

## 2022-04-08 RX ORDER — FENTANYL CITRATE 50 UG/ML
INJECTION, SOLUTION INTRAMUSCULAR; INTRAVENOUS AS NEEDED
Status: DISCONTINUED | OUTPATIENT
Start: 2022-04-08 | End: 2022-04-08

## 2022-04-08 RX ORDER — LIDOCAINE HYDROCHLORIDE 20 MG/ML
INJECTION, SOLUTION EPIDURAL; INFILTRATION; INTRACAUDAL; PERINEURAL AS NEEDED
Status: DISCONTINUED | OUTPATIENT
Start: 2022-04-08 | End: 2022-04-08

## 2022-04-08 RX ORDER — LIDOCAINE HYDROCHLORIDE 10 MG/ML
INJECTION, SOLUTION EPIDURAL; INFILTRATION; INTRACAUDAL; PERINEURAL AS NEEDED
Status: DISCONTINUED | OUTPATIENT
Start: 2022-04-08 | End: 2022-04-08

## 2022-04-08 RX ORDER — ONDANSETRON 2 MG/ML
INJECTION INTRAMUSCULAR; INTRAVENOUS AS NEEDED
Status: DISCONTINUED | OUTPATIENT
Start: 2022-04-08 | End: 2022-04-08

## 2022-04-08 RX ORDER — PROPOFOL 10 MG/ML
INJECTION, EMULSION INTRAVENOUS AS NEEDED
Status: DISCONTINUED | OUTPATIENT
Start: 2022-04-08 | End: 2022-04-08

## 2022-04-08 RX ADMIN — ACETAMINOPHEN 975 MG: 325 TABLET ORAL at 06:59

## 2022-04-08 RX ADMIN — FENTANYL CITRATE 50 MCG: 50 INJECTION, SOLUTION INTRAMUSCULAR; INTRAVENOUS at 07:52

## 2022-04-08 RX ADMIN — FENTANYL CITRATE 25 MCG: 50 INJECTION, SOLUTION INTRAMUSCULAR; INTRAVENOUS at 09:15

## 2022-04-08 RX ADMIN — DEXMEDETOMIDINE HCL 8 MCG: 100 INJECTION INTRAVENOUS at 08:05

## 2022-04-08 RX ADMIN — DEXMEDETOMIDINE HCL 4 MCG: 100 INJECTION INTRAVENOUS at 08:13

## 2022-04-08 RX ADMIN — CEFAZOLIN SODIUM 2000 MG: 2 SOLUTION INTRAVENOUS at 07:55

## 2022-04-08 RX ADMIN — MIDAZOLAM HYDROCHLORIDE 2 MG: 1 INJECTION, SOLUTION INTRAMUSCULAR; INTRAVENOUS at 07:45

## 2022-04-08 RX ADMIN — DEXMEDETOMIDINE HCL 4 MCG: 100 INJECTION INTRAVENOUS at 08:25

## 2022-04-08 RX ADMIN — DEXMEDETOMIDINE HCL 8 MCG: 100 INJECTION INTRAVENOUS at 08:19

## 2022-04-08 RX ADMIN — ALBUTEROL SULFATE 2.5 MG: 2.5 SOLUTION RESPIRATORY (INHALATION) at 07:36

## 2022-04-08 RX ADMIN — KETOROLAC TROMETHAMINE 30 MG: 30 INJECTION, SOLUTION INTRAMUSCULAR at 08:39

## 2022-04-08 RX ADMIN — PROPOFOL 200 MG: 10 INJECTION, EMULSION INTRAVENOUS at 07:52

## 2022-04-08 RX ADMIN — DEXAMETHASONE SODIUM PHOSPHATE 10 MG: 10 INJECTION, SOLUTION INTRAMUSCULAR; INTRAVENOUS at 08:09

## 2022-04-08 RX ADMIN — FENTANYL CITRATE 25 MCG: 50 INJECTION, SOLUTION INTRAMUSCULAR; INTRAVENOUS at 08:10

## 2022-04-08 RX ADMIN — ONDANSETRON 4 MG: 2 INJECTION INTRAMUSCULAR; INTRAVENOUS at 08:21

## 2022-04-08 RX ADMIN — FENTANYL CITRATE 25 MCG: 50 INJECTION, SOLUTION INTRAMUSCULAR; INTRAVENOUS at 08:00

## 2022-04-08 RX ADMIN — DEXMEDETOMIDINE HCL 8 MCG: 100 INJECTION INTRAVENOUS at 08:00

## 2022-04-08 RX ADMIN — DEXMEDETOMIDINE HCL 8 MCG: 100 INJECTION INTRAVENOUS at 08:40

## 2022-04-08 RX ADMIN — SODIUM CHLORIDE, SODIUM LACTATE, POTASSIUM CHLORIDE, AND CALCIUM CHLORIDE 125 ML/HR: .6; .31; .03; .02 INJECTION, SOLUTION INTRAVENOUS at 07:38

## 2022-04-08 RX ADMIN — GABAPENTIN 300 MG: 300 CAPSULE ORAL at 06:59

## 2022-04-08 RX ADMIN — FENTANYL CITRATE 25 MCG: 50 INJECTION, SOLUTION INTRAMUSCULAR; INTRAVENOUS at 08:13

## 2022-04-08 RX ADMIN — FENTANYL CITRATE 25 MCG: 50 INJECTION, SOLUTION INTRAMUSCULAR; INTRAVENOUS at 08:40

## 2022-04-08 RX ADMIN — LIDOCAINE HYDROCHLORIDE 100 MG: 20 INJECTION, SOLUTION EPIDURAL; INFILTRATION; INTRACAUDAL; PERINEURAL at 07:52

## 2022-04-08 RX ADMIN — FENTANYL CITRATE 25 MCG: 50 INJECTION, SOLUTION INTRAMUSCULAR; INTRAVENOUS at 08:25

## 2022-04-08 RX ADMIN — FENTANYL CITRATE 25 MCG: 50 INJECTION, SOLUTION INTRAMUSCULAR; INTRAVENOUS at 09:26

## 2022-04-08 RX ADMIN — FENTANYL CITRATE 25 MCG: 50 INJECTION, SOLUTION INTRAMUSCULAR; INTRAVENOUS at 08:02

## 2022-04-08 RX ADMIN — CHLORHEXIDINE GLUCONATE 0.12% ORAL RINSE 15 ML: 1.2 LIQUID ORAL at 06:59

## 2022-04-08 NOTE — OP NOTE
OPERATIVE REPORT  PATIENT NAME: Vanda Brooks    :  1975  MRN: [id number]  Pt Location: [facility] OR ROOM 04    SURGERY DATE: 2022    Surgeon(s) and Role:     * Maurice Gan,  - Primary     * Fina Prado PA-C - Assisting    Preop Diagnosis:  Complex tear of medial meniscus of right knee as current injury, initial encounter [S83 231A]    Post-Op Diagnosis Codes:     * Complex tear of medial meniscus of right knee as current injury, initial encounter [S83 231A]    Procedure(s) (LRB):  ARTHROSCOPY KNEE- Right Knee partial medial menisectomy (Right)    Specimen(s):  * No specimens in log *    Estimated Blood Loss:   0 mL    Drains:  * No LDAs found *    Anesthesia Type:   General with 20 cc 0 5% Marcaine local anesthetic    Operative Indications:  Complex tear of medial meniscus of right knee as current injury, initial encounter [S83 231A]  Persistent pain affecting activities of daily living despite oral analgesics, activity modification, formal physical therapy, and injections  Operative Findings:  Complex tear right knee medial meniscus from posterior body into posterior horn with unstable fragment projecting into posterior joint space    Complications:   None    Procedure and Technique:    Antibiotics:  Ancef 2 g    Fluids:  500 cc LR    Tourniquet time:  45 minutes    The patient was seen in the preoperative holding area and the appropriate site of surgery was confirmed and marked  Upon bringing the patient back to the operating room she was placed supine on the operating room table  A tourniquet was placed on the thigh of the operative leg and the leg was placed in the appropriate leg guajardo  The well leg was placed on a well-padded pillow  Esmarch was used to exsanguinate the leg and tourniquet was inflated to 250 mmHg  The right lower extremity was prepped and draped in the usual sterile fashion    An appropriate preoperative time-out was performed to once again confirm the site of surgery and procedure  A lateral incision was made for an arthroscopic viewing portal and the arthroscopic camera was inserted  A diagnostic arthroscopy was performed and displayed 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  A medial arthroscopic portal was made under direct visualization with the aid of an 18 gauge spinal needle  An arthroscopic probe was inserted and the remainder of the diagnostic arthroscopy was performed which further showed small area chondral thinning on the medial femoral condyle without full-thickness defect or loose flaps  Complex tear was noted of the medial meniscus extending from the posterior body into the posterior horn with unstable fragment that projected into the joint space upon probing  Upon observation of the ACL was noted to be intact  Lateral compartments Rizwana glass noted no chondral defects in the lateral compartment  Lateral meniscus was intact without any tear  Attention was turned back to the medial meniscus tear  An arthroscopic shaver was inserted to debride loose ends of the a stable meniscal tear  Upon removal of loose flaps there was noted to be a horizontal cleavage component through the length of the posterior horn  Posterior root was intact  Arthroscopic biter was also used to further remove unstable central edge along the posterior horn to stable meniscus tissue  This was further debrided with an arthroscopic shaver once again  The arthroscopic probe was once again inserted to ensure stable edges of the meniscus status post meniscectomy  Arthroscopic camera was then placed in the medial portal an arthroscopic shaver was used from the lateral portal to remove unstable meniscus from the undersurface of the anterior extent of the tear medial meniscus body  Final probing was performed to once again confirm stable residual meniscus tissue  Arthroscopic incisions were closed with 3-0 nylon suture    20 cc of 0 5% Marcaine was used as local anesthetic  A sterile dressing was applied  The patient tolerated the procedure well no complications were noted  The patient was transferred to the PACU without any issue  I was present for the entire procedure, A qualified resident physician was not available and A physician assistant, Matilde Santiago PA-C,  was required during the procedure for patient positioning, assistance with arthroscopic camera equipment due to the minimally invasive nature of the surgical case, and suturing      Patient Disposition:  PACU       SIGNATURE: Shedrick Meigs, DO  DATE: April 8, 2022  TIME: 8:43 AM

## 2022-04-08 NOTE — ANESTHESIA POSTPROCEDURE EVALUATION
Post-Op Assessment Note    CV Status:  Stable  Pain Score: 1    Pain management: adequate     Mental Status:  Alert and awake   Hydration Status:  Euvolemic   PONV Controlled:  Controlled   Airway Patency:  Patent      Post Op Vitals Reviewed: Yes      Staff: CRNA         No complications documented      BP   124/72   Temp 98 4   Pulse 82   Resp 16   SpO2 97% 4L FM

## 2022-04-08 NOTE — INTERVAL H&P NOTE
H&P reviewed  After examining the patient I find no changes in the patients condition since the H&P had been written  Patient was seen and evaluated in office where continued nonoperative vs surgical management was discussed regarding Right knee medial meniscus tear  In light of patients continued pain desepite nonoperative treatments including injections, PT, OTC oral analgesics, patient would like to move forward with surgery  Risks of surgery discussed in office with patient  14 point ROS Right knee pain  Patient will go to OR with Dr Carline Magana on 04/08/2022 for Right knee arthroscopy, partial medial menisectomy all associated procedures         Heart RRR  Lungs CTA BL    Vitals:    04/08/22 0641   BP: 129/88   Pulse: 74   Resp: 18   Temp: 98 5 °F (36 9 °C)   SpO2: 96%

## 2022-04-08 NOTE — DISCHARGE INSTRUCTIONS
Knee Surgery:  Postoperative Instructions  Dr Sandhya Vicente may resume your regular diet as soon as possible    Medication    Take the pain medication as prescribed   Take pain medication with food   While taking pain medications, you may NOT operate a vehicle, heavy machinery, or appliances   While taking pain medication, you may NOT drink alcoholic beverages   If you have any reactions to your medications, stop taking them and call my office   If you are not allergic, take one aspirin 81mg twice a day to help prevent blood clots   Please keep in mind that constipation is a very common side effect of taking narcotic pain medication  Take precautions to prevent constipation:  o Drink plenty of water (6-8 glasses of 8 oz  a day)  o Avoid alcohol, caffeine, and dairy products  o Eat plenty of fiber (fruits, vegetables, and whole grains)  o Take an over the counter stool softener (Colace or Dulcolax)    Activity    RANGE OF MOTION   _____ You may bend your knee as much as the dressings will allow     WEIGHT BEARING   _____ You may weight bear as tolerated     You may practice quadriceps muscle tightening and straight leg raises several times every hour   Please continue to move your ankle up and down and tighten and relax your calf muscles several times every hour to help reduce swelling and prevent blood clots   You may use your crutches for balance as needed until your first post operative visit   The optimal position of the leg after surgery is for you to be lying flat with your ankle higher than your knee and higher than your heart, in an effort to reduce swelling   It is important to continuously elevate your knee above your heart until your swelling is completely down   Please keep ice applied to the knee for at least the first 72 hours or as long as pain or swelling persists  Do not apply ice directly to skin, or allow water to leak on your dressing      Showering    You may shower 4 days after surgery unless told otherwise  DO NOT immerse the knee under water and DO NOT rub the incision  Pad the incisions dry and place new Band-Aids over the sutures after showering   If you have a brace, you may remove it to shower  Keep your knee straight in the shower   You may sponge bathe for the first 3 days, taking care to keep the dressing clean and dry  Dressing Care    Keep the dressing clean and dry   It is normal to get some bloody wound seepage through the bandage  DO NOT BE ALARMED   If the dressing gets soaked with wound seepage, please reinforce with a dry sterile dressing   Loosen the ace wrap around your knee if it becomes too tight or painful   Remove all dressings 3 days after surgery  If there is still some wound seepage, apply a fresh STERILE gauze over the incisions and secure with tape or an ace wrap   DO NOT TOUCH OR REMOVE THE SUTURES!! Emergency/Follow-Up   Please notify my office at [phone] if you develop any fever (101 deg or above), unexpected warmth, redness or swelling  Please call if your toes become cold, purple, numb, or there is excessive bleeding   Please call the office within 24 hours at [phone] to schedule a follow up appt within 7-10 days from surgery  Crutch Instructions   WHAT YOU NEED TO KNOW:   Crutches are tools that provide support and balance when you walk  You may need 1 or 2 crutches to help support your body weight  You may need crutches if you had surgery or an injury that affects your ability to walk  DISCHARGE INSTRUCTIONS:   Seek care immediately if:   · You have sudden numbness in a hand or arm  · Your arm is swollen, red, and warm to the touch  · Your fingers feel cold or have cramping pain  Call your doctor if:   · Your crutches do not fit  · One crutch is longer than the other      · Your crutches break or get lost     · The rubber tips of your crutches are split or loose  · You get blisters or painful calluses on your hands or armpits  · Your armpit is red, sore, or has bumps or pimples  · Your arm muscles get weaker the longer you use the crutches  · You have questions or concerns about your condition or care  How to use crutches safely:   · Support your weight with your arms and hands  Do not support your weight with your armpits  This could hurt the nerves and blood vessels that are in your armpits  Keep your elbow bent when the crutch is in place under your arm  · Walk slowly and carefully with crutches  Go up and down stairs and ramps slowly  Stop to rest when you feel tired  Get up slowly to a sitting or standing position  This will help prevent dizziness and fainting  Use your crutches only on firm ground  Use caution when you walk on ice or snow  Wet or waxed floors and smooth cement floors can be slippery  Watch out for small rugs or cords  · Create clear pathways between rooms in your home  You may need to move your furniture to do this  · Keep your needed items within reach  Carry items in a bag or backpack to keep your hands free  How to walk with crutches:   · Place both crutches under your arms  Place your hands on the hand  of the crutches  Place your crutches slightly in front of you  · Position the crutches  The top of the crutches should be about 2 fingers [id number] (about 1½ inches) below your armpits  Place your weight on your hands  The top of the crutches should not press into your armpits  · If you have one leg that is injured,  keep it off the floor by bending your knee  · Lift the crutches and move them a step ahead of you  Put the rubber ends of the crutches firmly on the ground  Move your injured leg between the crutches and slowly bring your body forward  Shift your weight onto the crutches using your arms   Take a normal step with your uninjured leg  · If you are using your crutches for balance,  move your right foot and left crutch forward  Then move your left foot and right crutch forward  Keep walking this way  How to go up stairs with crutches:   · Face the stairs  Put the crutches close to the first step  · Push onto the crutches and put your uninjured leg on the first step  · Put your weight on your uninjured leg that is on the first step  Bring both crutches and the injured leg onto the step at the same time  Make sure the rubber ends of the crutches are completely on the step  · When you hold onto a railing with one arm, put both crutches under the other arm  Use the railing to help you go up the stairs  How to go down stairs with crutches:   · Stand with the toes of your uninjured leg close to the edge of the step  · Bend the knee of your uninjured leg  Slowly lower both crutches along with the injured leg onto the next step  · Lean on your crutches  Slowly lower your uninjured leg onto the same step  · Place both crutches under one arm while you hold onto the railing with the other arm  How to sit in a chair with crutches:   · Make sure the chair is sturdy and will not move  Turn and back up to the chair until you feel the edge of it against the backs of your legs  Keep your injured leg forward  · Hold both crutches with one hand  Use your other hand to reach back and hold on to the chair  Slowly lower your body to the chair  How to get up from a chair with crutches:   · Sit on the edge of your chair  · Push up with your hands using the crutches or arms of the chair  Put your weight on your uninjured foot as you get up  · Keep your injured leg bent at the knee and off the floor  © Copyright [employer] 2022 Information is for End User's use only and may not be sold, redistributed or otherwise used for commercial purposes   All illustrations and images included in [facility] [address] are the copyrighted property of [employer]  or [facility] Shamir Hurt   The above information is an  only  It is not intended as medical advice for individual conditions or treatments  Talk to your doctor, nurse or pharmacist before following any medical regimen to see if it is safe and effective for you

## 2022-04-12 ENCOUNTER — EVALUATION (OUTPATIENT)
Dept: PHYSICAL THERAPY | Age: 47
End: 2022-04-12
Payer: COMMERCIAL

## 2022-04-12 DIAGNOSIS — S83.241D TEAR OF MEDIAL MENISCUS OF RIGHT KNEE, CURRENT, UNSPECIFIED TEAR TYPE, SUBSEQUENT ENCOUNTER: Primary | ICD-10-CM

## 2022-04-12 PROCEDURE — 97110 THERAPEUTIC EXERCISES: CPT | Performed by: PHYSICAL THERAPIST

## 2022-04-12 PROCEDURE — 97140 MANUAL THERAPY 1/> REGIONS: CPT | Performed by: PHYSICAL THERAPIST

## 2022-04-12 PROCEDURE — 97162 PT EVAL MOD COMPLEX 30 MIN: CPT | Performed by: PHYSICAL THERAPIST

## 2022-04-12 NOTE — PROGRESS NOTES
PT Evaluation     Today's date: 2022  Patient name: Ankit Mcdaniel  : 1975  MRN: [id number]  Referring provider: Shanti Mckeon PA-C  Dx:   Encounter Diagnosis     ICD-10-CM    1  Tear of medial meniscus of right knee, current, unspecified tear type, subsequent encounter  S83 289D Ambulatory Referral to Physical Therapy       Start Time: 0800  Stop Time: 0900  Total time in clinic (min): 60 minutes    Assessment  Assessment details: Ankit Mcdaniel is a 55 y o  male who presents with pain, decreased strength, decreased ROM, joint effusion and ambulatory dysfunction  Due to these impairments, Patient has difficulty performing a/iadls  Patient's clinical presentation is consistent with their referring diagnosis of right knee A/S PMM  Patient would benefit from skilled physical therapy to address their aforementioned impairments, improve their level of function and to improve their overall quality of life  Impairments: abnormal gait, abnormal muscle tone, abnormal or restricted ROM, abnormal movement, activity intolerance, impaired physical strength, lacks appropriate home exercise program, pain with function, weight-bearing intolerance, poor posture  and poor body mechanics  Understanding of Dx/Px/POC: good   Prognosis: good    Goals  ST-3 WEEKS  1  Decrease pain by 2 points on VAS at its worst   2   Increase ROM by > 5 deg in all deficients planes  3   Increase LE by 1/2 MMT grade in all deficient planes  LT-6 WEEKS  1  Patient to be independent with a/iadls especially steps and squatting  2  Increase functional activities for leisure and home activities to previous LOF    3  Independent with HEP and/or fitness program     Plan  Patient would benefit from: skilled physical therapy  Planned modality interventions: cryotherapy, electrical stimulation/Russian stimulation, thermotherapy: hydrocollator packs and unattended electrical stimulation  Planned therapy interventions: activity modification, behavior modification, body mechanics training, aquatic therapy, flexibility, functional ROM exercises, home exercise program, IADL retraining, joint mobilization, manual therapy, neuromuscular re-education, patient education, postural training, strengthening, stretching, therapeutic activities and therapeutic exercise  Frequency: 2x week (2-3x week)  Duration in weeks: 12  Plan of Care beginning date: 2022  Plan of Care expiration date: 2022  Treatment plan discussed with: patient        Subjective Evaluation    History of Present Illness  Date of onset: 2021  Date of surgery: 2022  Mechanism of injury: Hit wall while roller skating, injected with cortisone and PT and knee worsen , s/p A/S right knee PMM, complains of knee soreness and stiffness          Not a recurrent problem   Quality of life: good    Pain  Current pain ratin  At best pain ratin  At worst pain ratin  Quality: pulling and discomfort  Relieving factors: rest and ice  Aggravating factors: stair climbing  Progression: improved    Social Support  Steps to enter house: yes  Stairs in house: no   Lives in: [facility]  Lives with: alone      Diagnostic Tests  X-ray: abnormal  MRI studies: abnormal  Treatments  Previous treatment: physical therapy and injection treatment  Patient Goals  Patient goals for therapy: decreased edema, decreased pain, increased motion, increased strength and independence with ADLs/IADLs          Objective     Observations     Right Knee   Positive for edema and incision       Active Range of Motion   Left Knee   Flexion: 130 degrees   Extension: 0 degrees     Right Knee   Flexion: 75 degrees   Extension: 0 degrees     Strength/Myotome Testing     Right Knee   Flexion: 4  Extension: 4-  Quadriceps contraction: fair    Swelling     Left Knee Girth Measurement (cm)   Joint line: 42 cm    Right Knee Girth Measurement (cm)   Joint line: 43 cm      Flowsheet Rows      Most Recent Value   PT/OT G-Codes    Current Score 36   Projected Score 64             Precautions: standard      Manuals 4/12                         MT right knee 10                                      Neuro Re-Ed                                                                                                        Ther Ex             Nu step 4 min            Slant              Hip add             Hip abd             SLR 2x10            SAQ 2/30            LAQ 2/30            Ball slides 30x            Ther Activity                                       Gait Training                                       Modalities

## 2022-04-18 ENCOUNTER — OFFICE VISIT (OUTPATIENT)
Dept: OBGYN CLINIC | Facility: CLINIC | Age: 47
End: 2022-04-18

## 2022-04-18 ENCOUNTER — OFFICE VISIT (OUTPATIENT)
Dept: PHYSICAL THERAPY | Age: 47
End: 2022-04-18
Payer: COMMERCIAL

## 2022-04-18 VITALS
HEIGHT: 72 IN | WEIGHT: 248 LBS | DIASTOLIC BLOOD PRESSURE: 95 MMHG | HEART RATE: 86 BPM | BODY MASS INDEX: 33.59 KG/M2 | SYSTOLIC BLOOD PRESSURE: 137 MMHG

## 2022-04-18 DIAGNOSIS — S83.231A COMPLEX TEAR OF MEDIAL MENISCUS OF RIGHT KNEE AS CURRENT INJURY, INITIAL ENCOUNTER: Primary | ICD-10-CM

## 2022-04-18 DIAGNOSIS — Z98.890 S/P RIGHT KNEE ARTHROSCOPY: ICD-10-CM

## 2022-04-18 DIAGNOSIS — S83.241D TEAR OF MEDIAL MENISCUS OF RIGHT KNEE, CURRENT, UNSPECIFIED TEAR TYPE, SUBSEQUENT ENCOUNTER: Primary | ICD-10-CM

## 2022-04-18 PROCEDURE — 97110 THERAPEUTIC EXERCISES: CPT

## 2022-04-18 PROCEDURE — 99024 POSTOP FOLLOW-UP VISIT: CPT | Performed by: ORTHOPAEDIC SURGERY

## 2022-04-18 NOTE — PROGRESS NOTES
Patient Name:  Janene Hurst  MRN:  [id number]    [address] [place], [place] [address]     1  Complex tear of medial meniscus of right knee as current injury, initial encounter    2  S/P right knee arthroscopy        55 y o  male approximately 10 day s/p Right knee partial knee menisectomy performed on 04/08/2022  Overall, patient doing very well s/p surgical intervention with attendance of outpatient PT and ambulation without assistive device  Advised patient to continue with PT to work on strengthening of quads, hip abductors, and hamstrings  Try to avoid deep squats at this time and continue with short arc quad sets  No soaking in pool/tub with Right knee at this time  Patient is eager to get back to work and activity and is eager to continue to work with PT  He verbalized he will not try to over do his activities throughout the day  He will follow up in office in 4 weeks for reevaluation  History of the Present Illness   Janene Hurst is a 55 y o  male approximately 10 day s/p Right knee partial medial menisectomy performed on 04/08/2022  Overall, patient states he is doing great s/p surgical intervention  He reports he used crutches for 2 days and weaned from them immediately  He did report to PT office the day of surgery to set up appointments and already attended a couple sessions  He admits he is feeling good  He denies current pain  Review of Systems     Review of Systems   Constitutional: Negative for chills and fever  HENT: Negative for ear pain and sore throat  Eyes: Negative for pain and visual disturbance  Respiratory: Negative for cough and shortness of breath  Cardiovascular: Negative for chest pain and palpitations  Gastrointestinal: Negative for abdominal pain and vomiting  Genitourinary: Negative for dysuria and hematuria  Musculoskeletal: Negative for arthralgias and back pain  Skin: Negative for color change and rash  Neurological: Negative for seizures and syncope     All other systems reviewed and are negative  Physical Exam     /95   Pulse 86   Ht 6' (1 829 m)   Wt 112 kg (248 lb)   BMI 33 63 kg/m²     Right Knee  Surgical incisions well healed without evidence of infection  Nylon suture removed today   Range of motion from 0 to 125-130  There is trace post operative effusion  There is minimal tenderness over the medial joint line  The patient is ableperform a straight leg raise  Varus stress testing reveals no pain or laxity at 0 and 30 degrees   Valgus stress testing reveals minimal discomfort at 0 and 30 degrees without laxity  The patient is neurovascular intact distally  Data Review     I have personally reviewed pertinent films in PACS, and my interpretation follows      Arthroscopic images reviewed in office with patient     Social History     Tobacco Use    Smoking status: Former Smoker     Packs/day: 1 00     Years: 26 00     Pack years: 26 00     Quit date: 2012     Years since quitting: 10 3    Smokeless tobacco: Never Used   Vaping Use    Vaping Use: Never used   Substance Use Topics    Alcohol use: Yes     Comment: 3-4 x year    Drug use: No           Procedures  None     Samantha Quinonez PA-C

## 2022-04-18 NOTE — PROGRESS NOTES
Daily Note     Today's date: 2022  Patient name: Lincoln López  : 1975  MRN: [id number]  Referring provider: Logan Gupta PA-C  Dx:   Encounter Diagnosis     ICD-10-CM    1  Tear of medial meniscus of right knee, current, unspecified tear type, subsequent encounter  S83 241D        Start Time: 0800  Stop Time: 0840  Total time in clinic (min): 40 minutes    Subjective: Reports being a little sore at medial knee but not too bad  Objective: See treatment diary below      Assessment: Tolerated treatment well  Progressed program today with addition of Cybex hip machines and leg press, with limited flexion on leg press to avoid compression  Progressed weights for LAQ and SAQ with good tolerance  Minimal soreness post session, good tolerance to progressions  Cues for carryover and to ensure proper form  Patient would benefit from continued PT  Plan: Continue per plan of care        Precautions: standard      Manuals                         MT right knee 10 10'                                     Neuro Re-Ed                                                                                                        Ther Ex             Nu step 4 min 8' L4           Slant              Hip add CYBEX  30# 30x           Hip abd CYBEX  30# 30x           Leg press (limit flex to 50 deg)   60# 30x            SLR 2x10 3x10           SAQ 2/30 3# 30x           LAQ 30 3# 30x            Ball slides 30x 30x                                                  Ther Activity                                       Gait Training                                       Modalities

## 2022-04-21 ENCOUNTER — OFFICE VISIT (OUTPATIENT)
Dept: PHYSICAL THERAPY | Age: 47
End: 2022-04-21
Payer: COMMERCIAL

## 2022-04-21 DIAGNOSIS — S83.241D TEAR OF MEDIAL MENISCUS OF RIGHT KNEE, CURRENT, UNSPECIFIED TEAR TYPE, SUBSEQUENT ENCOUNTER: Primary | ICD-10-CM

## 2022-04-21 PROCEDURE — 97110 THERAPEUTIC EXERCISES: CPT

## 2022-04-21 NOTE — PROGRESS NOTES
Daily Note     Today's date: 2022  Patient name: Thu Davis  : 1975  MRN: [id number]  Referring provider: Alejandro Preston PA-C  Dx:   Encounter Diagnosis     ICD-10-CM    1  Tear of medial meniscus of right knee, current, unspecified tear type, subsequent encounter  S83 241D        Start Time: 07  Stop Time: 08  Total time in clinic (min): 45 minutes    Subjective: States he is sore, especially in the morning when his knees are pressing together, does try a pillow but it moves during the night  Objective: See treatment diary below      Assessment: Tolerated treatment well  Patient progressing well, no c/o elevated pain or soreness t/o session  Challenged by S/L hip abduction, cues to maintain proper position in order to avoid compensation  Good tolerance to addition of step ups on a 4 inch step, no c/o pain  Some TTP at medial knee present  Patient would benefit from continued PT  Plan: Continue per plan of care        Precautions: standard      Manuals                        MT right knee 10 10' 10'                                     Neuro Re-Ed                                                                                                        Ther Ex             Nu step 4 min 8' L4 L4 x10'           Slant    L3 30''x4           Hip add CYBEX  30# 30x 40# 30x          Hip abd CYBEX  30# 30x 40# 30x          Leg press (limit flex to 50 deg)   60# 30x  70# 30x          SLR 2x10 3x10 3x10          SAQ 2/30 3# 30x 4# 30x          LAQ 2/30 3# 30x  4# 30x           Ball slides 30x 30x 30x           S/L hip abd   2x10          Prone hip ext    2x10                       Ther Activity             Step ups    4'' x20                       Gait Training                                       Modalities

## 2022-04-25 ENCOUNTER — OFFICE VISIT (OUTPATIENT)
Dept: PHYSICAL THERAPY | Age: 47
End: 2022-04-25
Payer: COMMERCIAL

## 2022-04-25 DIAGNOSIS — S83.241D TEAR OF MEDIAL MENISCUS OF RIGHT KNEE, CURRENT, UNSPECIFIED TEAR TYPE, SUBSEQUENT ENCOUNTER: Primary | ICD-10-CM

## 2022-04-25 PROCEDURE — 97110 THERAPEUTIC EXERCISES: CPT

## 2022-04-25 NOTE — PROGRESS NOTES
Daily Note     Today's date: 2022  Patient name: Brandon Luna  : 1975  MRN: [id number]  Referring provider: Malcom Peña PA-C  Dx:   Encounter Diagnosis     ICD-10-CM    1  Tear of medial meniscus of right knee, current, unspecified tear type, subsequent encounter  S83 241D        Start Time: 0754  Stop Time: 0840  Total time in clinic (min): 46 minutes    Subjective: Reports 1-2/10 pain at his R knee  States it feels like a bruise at his medial knee and worsens at night when he sleeps on his side  Has tried a pillow between his knees but still feels it at his medial knee, increases when laying on his R side  Objective: See treatment diary below      Assessment: Tolerated treatment well  Patient was able to complete program as documented below  Minimal c/o pain t/o session, some TTP along medial knee with PROM  ROM progressing well  Increased weights for SAQ and LAQ without issues  Cues for slow down and control movements during exercises  Patient would benefit from continued PT  Plan: Continue per plan of care        Precautions: standard      Manuals                        MT right knee 10 10' 10'  10'                                    Neuro Re-Ed                                                                                                        Ther Ex             Nu step 4 min 8' L4 L4 x10'  L6 x10'         Slant    L3 30''x4  L3 30''x4          Hip add CYBEX  30# 30x 40# 30x 40# 30x         Hip abd CYBEX  30# 30x 40# 30x 40# 30x          Leg press (limit flex to 50 deg)   60# 30x  70# 30x 70# 30x          Calf press Cybex     40# 30x          SLR 2x10 3x10 3x10 3x10          SAQ 2/30 3# 30x 4# 30x 5# 30         LAQ 2/30 3# 30x  4# 30x  5# 30x          Ball slides 30x 30x 30x  30x          S/L hip abd   2x10 3x10         Prone hip ext    2x10 3x10                       Ther Activity             Step ups    4'' x20 6''x30                      Gait Training Modalities

## 2022-04-28 ENCOUNTER — OFFICE VISIT (OUTPATIENT)
Dept: PHYSICAL THERAPY | Age: 47
End: 2022-04-28
Payer: COMMERCIAL

## 2022-04-28 DIAGNOSIS — S83.241D TEAR OF MEDIAL MENISCUS OF RIGHT KNEE, CURRENT, UNSPECIFIED TEAR TYPE, SUBSEQUENT ENCOUNTER: Primary | ICD-10-CM

## 2022-04-28 PROCEDURE — 97110 THERAPEUTIC EXERCISES: CPT

## 2022-05-02 ENCOUNTER — OFFICE VISIT (OUTPATIENT)
Dept: PHYSICAL THERAPY | Age: 47
End: 2022-05-02
Payer: COMMERCIAL

## 2022-05-02 DIAGNOSIS — S83.241D TEAR OF MEDIAL MENISCUS OF RIGHT KNEE, CURRENT, UNSPECIFIED TEAR TYPE, SUBSEQUENT ENCOUNTER: Primary | ICD-10-CM

## 2022-05-02 PROCEDURE — 97530 THERAPEUTIC ACTIVITIES: CPT

## 2022-05-02 PROCEDURE — 97110 THERAPEUTIC EXERCISES: CPT

## 2022-05-02 NOTE — PROGRESS NOTES
Daily Note     Today's date: 2022  Patient name: Austen Garcia  : 1975  MRN: [id number]  Referring provider: Fay Ga PA-C  Dx:   Encounter Diagnosis     ICD-10-CM    1  Tear of medial meniscus of right knee, current, unspecified tear type, subsequent encounter  S83 281D        Start Time: 746  Stop Time: 833  Total time in clinic (min): 47 minutes    Subjective: Reports the soreness at his medial knee continues to improve      Objective: See treatment diary below      Assessment: Tolerated treatment well  Good recall of program, few cues for carryover  Patient was able to perform SLR in supine with 5# ankle weight today without positive ext lag, demonstrates good form  ROM progressing well, performed rectus femoris stretch over edge of table with good tolerance  No c/o increased pain post session  Patient would benefit from continued PT      Plan: Continue per plan of care        Precautions: standard      Manuals                     MT right knee 10 10' 10'  10'  10' 10'                                 Neuro Re-Ed                                                                                                        Ther Ex             Nu step 4 min 8' L4 L4 x10'  L6 x10' L6 10' L6 10'        Slant    L3 30''x4  L3 30''x4  L3 30''x4  L3 30''x4       Hip add CYBEX  30# 30x 40# 30x 40# 30x 45# 30x 45# 30x        Hip abd CYBEX  30# 30x 40# 30x 40# 30x  45# 30x  45# 30x        Leg press (limit flex to 50 deg)   60# 30x  70# 30x 70# 30x  80# 30x  90# 30x        Calf press Cybex     40# 30x  60# 30x  70# 30x        SLR 2x10 3x10 3x10 3x10  2# 30x  2# 30x        SAQ 2/30 3# 30x 4# 30x 5# 30 5# 30x 5# 30x       LAQ 2/30 3# 30x  4# 30x  5# 30x  5# 30x  5# 30x        Ball slides 30x 30x 30x  30x  30x  30x        S/L hip abd   2x10 3x10 3x10 3x10        Prone hip ext    2x10 3x10  3x10 3x10                    Ther Activity             Step ups    4'' x20 6''x30 6" x30  6''x Lateral step up     4'' x30 6'' 30x        Gait Training                                       Modalities

## 2022-05-05 ENCOUNTER — OFFICE VISIT (OUTPATIENT)
Dept: PHYSICAL THERAPY | Age: 47
End: 2022-05-05
Payer: COMMERCIAL

## 2022-05-05 DIAGNOSIS — S83.241D TEAR OF MEDIAL MENISCUS OF RIGHT KNEE, CURRENT, UNSPECIFIED TEAR TYPE, SUBSEQUENT ENCOUNTER: Primary | ICD-10-CM

## 2022-05-05 PROCEDURE — 97110 THERAPEUTIC EXERCISES: CPT

## 2022-05-05 NOTE — PROGRESS NOTES
Daily Note     Today's date: 2022  Patient name: Jacklyn Vargas  : 1975  MRN: [id number]  Referring provider: Kalia Elliott PA-C  Dx:   Encounter Diagnosis     ICD-10-CM    1  Tear of medial meniscus of right knee, current, unspecified tear type, subsequent encounter  S83 909D        Start Time: 0745  Stop Time: 08  Total time in clinic (min): 45 minutes    Subjective: Reports he played volleyball yesterday and both knees are sore, patient states it was his first game of the season and he thinks it is more muscle soreness      Objective: See treatment diary below      Assessment: Tolerated treatment well  Added step downs with verbal cues to ensure hip and knee stay in proper alignment and to control the eccentric movement  Patient demonstrated proper form with these VCs  Good recall of previously performed exercises  Continues with mild TTP at medial knee  Patient would benefit from continued PT  Plan: Continue per plan of care        Precautions: standard      Manuals                    MT right knee 10 10' 10'  10'  10' 10' 10'                                Neuro Re-Ed                                                                                                        Ther Ex             Nu step 4 min 8' L4 L4 x10'  L6 x10' L6 10' L6 10'  L6 10'       Slant    L3 30''x4  L3 30''x4  L3 30''x4  L3 30''x4 L3 30''x4      Hip add CYBEX  30# 30x 40# 30x 40# 30x 45# 30x 45# 30x  45# 30x       Hip abd CYBEX  30# 30x 40# 30x 40# 30x  45# 30x  45# 30x  45# 30x       Leg press (limit flex to 50 deg)   60# 30x  70# 30x 70# 30x  80# 30x  90# 30x  105# 30x      Calf press Cybex     40# 30x  60# 30x  70# 30x  80# 30x       SLR 2x10 3x10 3x10 3x10  2# 30x  5# 30x  5# 30x       SAQ 2/30 3# 30x 4# 30x 5# 30 5# 30x 5# 30x 6# 30x      LAQ 2/30 3# 30x  4# 30x  5# 30x  5# 30x  5# 30x  6# 30x       Ball slides 30x 30x 30x  30x  30x  30x  30x      S/L hip abd   2x10 3x10 3x10 3x10  3x10      Prone hip ext    2x10 3x10  3x10 3x10 3x10                   Ther Activity             Step ups    4'' x20 6''x30 6" x30  6''x 6'' 30x      Lateral step up     4'' x30 6'' 30x  6'' 30x       Lateral step down with heel tap       4'' x10      Gait Training                                       Modalities

## 2022-05-09 ENCOUNTER — EVALUATION (OUTPATIENT)
Dept: PHYSICAL THERAPY | Age: 47
End: 2022-05-09
Payer: COMMERCIAL

## 2022-05-09 DIAGNOSIS — S83.241D TEAR OF MEDIAL MENISCUS OF RIGHT KNEE, CURRENT, UNSPECIFIED TEAR TYPE, SUBSEQUENT ENCOUNTER: Primary | ICD-10-CM

## 2022-05-09 PROCEDURE — 97110 THERAPEUTIC EXERCISES: CPT | Performed by: PHYSICAL THERAPIST

## 2022-05-09 PROCEDURE — 97140 MANUAL THERAPY 1/> REGIONS: CPT | Performed by: PHYSICAL THERAPIST

## 2022-05-09 NOTE — PROGRESS NOTES
PT Re-Evaluation     Today's date: 2022  Patient name: Anh Dias  : 1975  MRN: [id number]  Referring provider: Aydee Newby PA-C  Dx:   Encounter Diagnosis     ICD-10-CM    1  Tear of medial meniscus of right knee, current, unspecified tear type, subsequent encounter  S83 241D        Start Time: 0730  Stop Time: 0815  Total time in clinic (min): 45 minutes    Assessment  Assessment details: 2022, patient demonstrates increased Knee ROM and strength as well as improved his FOTO scores  Patient has returned to work and has increased his ADL's  Impairments: abnormal gait, abnormal muscle tone, abnormal or restricted ROM, abnormal movement, activity intolerance, impaired physical strength, lacks appropriate home exercise program, pain with function, weight-bearing intolerance, poor posture  and poor body mechanics  Understanding of Dx/Px/POC: good   Prognosis: good    Goals  ST-3 WEEKS  1  Decrease pain by 2 points on VAS at its worst MET  2  Increase ROM by > 5 deg in all deficients planes  MET  3  Increase LE by 1/2 MMT grade in all deficient planes  WORKING TOWARDS    LT-6 WEEKS  1  Patient to be independent with a/iadls especially steps and squatting  WORKING TOWARDS  2  Increase functional activities for leisure and home activities to previous LOF  3  Independent with HEP and/or fitness program   NEW GOAL  1  Patient will continue with aggressive strengthening exercises so that he will be able play volleyball pain free      Plan  Patient would benefit from: skilled physical therapy  Planned modality interventions: cryotherapy, electrical stimulation/Russian stimulation, thermotherapy: hydrocollator packs and unattended electrical stimulation  Planned therapy interventions: activity modification, behavior modification, body mechanics training, aquatic therapy, flexibility, functional ROM exercises, home exercise program, IADL retraining, joint mobilization, manual therapy, neuromuscular re-education, patient education, postural training, strengthening, stretching, therapeutic activities and therapeutic exercise  Frequency: 2x week (2-3x week)  Duration in weeks: 4  Plan of Care beginning date: 2022  Plan of Care expiration date: 2022  Treatment plan discussed with: patient        Subjective Evaluation    History of Present Illness  Date of onset: 2021  Date of surgery: 2022  Mechanism of injury: 2022, patient has made good progress with decreased pain and increase ROM to right knee  He still feels soreness to medial knee but has increased his ADL's  Not a recurrent problem   Quality of life: good    Pain  Current pain ratin  At best pain ratin  At worst pain ratin  Quality: pulling and discomfort  Relieving factors: rest and ice  Aggravating factors: stair climbing  Progression: improved    Social Support  Steps to enter house: yes  Stairs in house: no   Lives in: [facility]  Lives with: alone      Diagnostic Tests  X-ray: abnormal  MRI studies: abnormal  Treatments  Previous treatment: physical therapy and injection treatment  Patient Goals  Patient goals for therapy: decreased edema, decreased pain, increased motion, increased strength and independence with ADLs/IADLs          Objective     Observations     Right Knee   Negative for edema and incision       Active Range of Motion   Left Knee   Flexion: 130 degrees   Extension: 0 degrees     Right Knee   Flexion: 120 degrees   Extension: 0 degrees     Strength/Myotome Testing     Right Knee   Flexion: 4+  Extension: 4  Quadriceps contraction: fair    Swelling     Left Knee Girth Measurement (cm)   Joint line: 42 cm    Right Knee Girth Measurement (cm)   Joint line: 43 cm      Flowsheet Rows      Most Recent Value   PT/OT G-Codes    Current Score 69   Projected Score 64          Precautions: standard      Manuals                   MT right knee 10 10' 10'  10'  10' 10' 10' 10                               Neuro Re-Ed                                                                                                        Ther Ex             Nu step 4 min 8' L4 L4 x10'  L6 x10' L6 10' L6 10'  L6 10'  10     Slant    L3 30''x4  L3 30''x4  L3 30''x4  L3 30''x4 L3 30''x4 4x     Hip add CYBEX  30# 30x 40# 30x 40# 30x 45# 30x 45# 30x  45# 30x  50/30     Hip abd CYBEX  30# 30x 40# 30x 40# 30x  45# 30x  45# 30x  45# 30x  50/30     Leg press (limit flex to 50 deg)   60# 30x  70# 30x 70# 30x  80# 30x  90# 30x  105# 30x 125/30     Calf press Cybex     40# 30x  60# 30x  70# 30x  80# 30x  80/30     SLR 2x10 3x10 3x10 3x10  2# 30x  5# 30x  5# 30x  6/30     SAQ 2/30 3# 30x 4# 30x 5# 30 5# 30x 5# 30x 6# 30x 6/30     LAQ 2/30 3# 30x  4# 30x  5# 30x  5# 30x  5# 30x  6# 30x  6/30     Ball slides 30x 30x 30x  30x  30x  30x  30x 30x     S/L hip abd   2x10 3x10 3x10 3x10  3x10 30x     Prone hip ext    2x10 3x10  3x10 3x10 3x10 30x                  Ther Activity             Step ups    4'' x20 6''x30 6" x30  6''x 6'' 30x 30x     Lateral step up     4'' x30 6'' 30x  6'' 30x  30x     Lateral step down with heel tap       4'' x10 10x     Bike        5 min                               Modalities

## 2022-05-12 ENCOUNTER — OFFICE VISIT (OUTPATIENT)
Dept: PHYSICAL THERAPY | Age: 47
End: 2022-05-12
Payer: COMMERCIAL

## 2022-05-12 DIAGNOSIS — S83.241D TEAR OF MEDIAL MENISCUS OF RIGHT KNEE, CURRENT, UNSPECIFIED TEAR TYPE, SUBSEQUENT ENCOUNTER: Primary | ICD-10-CM

## 2022-05-12 PROCEDURE — 97530 THERAPEUTIC ACTIVITIES: CPT

## 2022-05-12 PROCEDURE — 97110 THERAPEUTIC EXERCISES: CPT

## 2022-05-12 NOTE — PROGRESS NOTES
Daily Note     Today's date: 2022  Patient name: Vahid Gama  : 1975  MRN: [id number]  Referring provider: Chaya Sorto PA-C  Dx:   Encounter Diagnosis     ICD-10-CM    1  Tear of medial meniscus of right knee, current, unspecified tear type, subsequent encounter  S83 030D        Start Time: 708  Stop Time: 0753  Total time in clinic (min): 45 minutes    Subjective: Reports he has an irritated feeling in his knee, still feels like a bruise  Objective: See treatment diary below      Assessment: Tolerated treatment fairly well  Good recall of exercises, patient self progressed weights for Cybex machines without issues  The only exercise that produces some pain are FW step ups  Patient was able to complete program as outlined below  Patient would benefit from continued PT  Plan: Continue per plan of care        Precautions: standard      Manuals                   MT right knee 10 10' 10'  10'  10' 10' 10' 10'                               Neuro Re-Ed                                                                                                        Ther Ex             Nu step 4 min 8' L4 L4 x10'  L6 x10' L6 10' L6 10'  L6 10'  L6 10'     Slant    L3 30''x4  L3 30''x4  L3 30''x4  L3 30''x4 L3 30''x4 L3 30''x4      Hip add CYBEX  30# 30x 40# 30x 40# 30x 45# 30x 45# 30x  45# 30x  50# 30x     Hip abd CYBEX  30# 30x 40# 30x 40# 30x  45# 30x  45# 30x  45# 30x  50# 30x      Leg press (limit flex to 50 deg)   60# 30x  70# 30x 70# 30x  80# 30x  90# 30x  210# 30x 210# 30x      Calf press Cybex     40# 30x  60# 30x  70# 30x  80# 30x  110# 30x      SLR 2x10 3x10 3x10 3x10  2# 30x  5# 30x  5# 30x  6# 30      SAQ 2/30 3# 30x 4# 30x 5# 30 5# 30x 5# 30x 6# 30x 6# 30x     LAQ 2/30 3# 30x  4# 30x  5# 30x  5# 30x  5# 30x  6# 30x  6# 30x      Ball slides 30x 30x 30x  30x  30x  30x  30x 30x      S/L hip abd   2x10 3x10 3x10 3x10  3x10 3x10     Prone hip ext    2x10 3x10  3x10 3x10 3x10 3x10                  Ther Activity             Step ups    4'' x20 6''x30 6" x30  6''x 6'' 30x 6'' 30x     Lateral step up     4'' x30 6'' 30x  6'' 30x  6'' 30x     Lateral step down with heel tap       4'' x10 6'' 30x      Gait Training                                       Modalities

## 2022-05-16 ENCOUNTER — OFFICE VISIT (OUTPATIENT)
Dept: PHYSICAL THERAPY | Age: 47
End: 2022-05-16
Payer: COMMERCIAL

## 2022-05-16 ENCOUNTER — OFFICE VISIT (OUTPATIENT)
Dept: OBGYN CLINIC | Facility: CLINIC | Age: 47
End: 2022-05-16

## 2022-05-16 VITALS
DIASTOLIC BLOOD PRESSURE: 83 MMHG | SYSTOLIC BLOOD PRESSURE: 118 MMHG | HEART RATE: 98 BPM | BODY MASS INDEX: 33.86 KG/M2 | HEIGHT: 72 IN | WEIGHT: 250 LBS

## 2022-05-16 DIAGNOSIS — S83.241D TEAR OF MEDIAL MENISCUS OF RIGHT KNEE, CURRENT, UNSPECIFIED TEAR TYPE, SUBSEQUENT ENCOUNTER: Primary | ICD-10-CM

## 2022-05-16 DIAGNOSIS — S83.231A COMPLEX TEAR OF MEDIAL MENISCUS OF RIGHT KNEE AS CURRENT INJURY, INITIAL ENCOUNTER: ICD-10-CM

## 2022-05-16 DIAGNOSIS — Z98.890 S/P RIGHT KNEE ARTHROSCOPY: Primary | ICD-10-CM

## 2022-05-16 PROCEDURE — 97110 THERAPEUTIC EXERCISES: CPT | Performed by: PHYSICAL THERAPIST

## 2022-05-16 PROCEDURE — 97140 MANUAL THERAPY 1/> REGIONS: CPT | Performed by: PHYSICAL THERAPIST

## 2022-05-16 PROCEDURE — 99024 POSTOP FOLLOW-UP VISIT: CPT | Performed by: ORTHOPAEDIC SURGERY

## 2022-05-16 NOTE — PROGRESS NOTES
Daily Note     Today's date: 2022  Patient name: Fortino Krause  : 1975  MRN: [id number]  Referring provider: Deanna Meneses PA-C  Dx:   Encounter Diagnosis     ICD-10-CM    1  Tear of medial meniscus of right knee, current, unspecified tear type, subsequent encounter  S83 244D        Start Time: 0730  Stop Time: 0815  Total time in clinic (min): 45 minutes    Subjective: patient complains of medial knee feels like a bruise      Objective: See treatment diary below      Assessment: Tolerated treatment fair  Patient demonstrated fatigue post treatment, exhibited good technique with therapeutic exercises and would benefit from continued PT, still with complaints of medial knee pain on steps      Plan: Progress treatment as tolerated         Precautions: standard      Manuals                  MT right knee 10 10' 10'  10'  10' 10' 10' 10' 10                              Neuro Re-Ed                                                                                                        Ther Ex             Nu step 4 min 8' L4 L4 x10'  L6 x10' L6 10' L6 10'  L6 10'  L6 10' 10    Slant    L3 30''x4  L3 30''x4  L3 30''x4  L3 30''x4 L3 30''x4 L3 30''x4  4x    Hip add CYBEX  30# 30x 40# 30x 40# 30x 45# 30x 45# 30x  45# 30x  50# 30x 50/30    Hip abd CYBEX  30# 30x 40# 30x 40# 30x  45# 30x  45# 30x  45# 30x  50# 30x  50/30    Leg press (limit flex to 50 deg)   60# 30x  70# 30x 70# 30x  80# 30x  90# 30x  210# 30x 210# 30x  210/30    Calf press Cybex     40# 30x  60# 30x  70# 30x  80# 30x  110# 30x  110/30    SLR 2x10 3x10 3x10 3x10  2# 30x  5# 30x  5# 30x  6# 30  6/30    SAQ 2/30 3# 30x 4# 30x 5# 30 5# 30x 5# 30x 6# 30x 6# 30x 6/30    LAQ 2/30 3# 30x  4# 30x  5# 30x  5# 30x  5# 30x  6# 30x  6# 30x  6/30    Ball slides 30x 30x 30x  30x  30x  30x  30x 30x  30x    S/L hip abd   2x10 3x10 3x10 3x10  3x10 3x10 30x    Prone hip ext    2x10 3x10  3x10 3x10 3x10 3x10 30x Ther Activity             Step ups    4'' x20 6''x30 6" x30  6''x 6'' 30x 6'' 30x 30x    Lateral step up     4'' x30 6'' 30x  6'' 30x  6'' 30x 30x    Lateral step down with heel tap       4'' x10 6'' 30x  30x    Gait Training                                       Modalities

## 2022-05-16 NOTE — PROGRESS NOTES
Patient Name:  Myla Aldridge  MRN:  [id number]    [address] [place] [address]     1  S/P right knee arthroscopy    2  Complex tear of medial meniscus of right knee as current injury, initial encounter      55 y o  male approximately 5 week s/p Right knee arthroscopic partial medial menisectomy performed on 04/08/2022  Overall, patient doing very well s/p surgical intervention  At this time, patient can consider transition to home exercises after his scheduled appointments and getting back into gym exercises  Advised patient to avoid >90 degrees of knee flexion during back squats, avoid aggressive free weight exercises when starting  Advised patient he can notice some soreness when starting more exercises  Verbalized understanding of the above and will follow up in office in 6 weeks for reevaluation of Right knee  History of the Present Illness   Myla Aldridge is a 55 y o  male approximately 5 week s/p Right knee arthroscopic partial medial menisectomy performed on 04/08/2022  Today, patient reports moderate improvement of pain from pre operative level  He does admit to a bruising feeling over the medial aspect of Left knee, but notes mild improvement  He continues to attend outpatient PT with benefit of strength and range of motion  Review of Systems     Review of Systems   Constitutional: Negative for chills and fever  HENT: Negative for ear pain and sore throat  Eyes: Negative for pain and visual disturbance  Respiratory: Negative for cough and shortness of breath  Cardiovascular: Negative for chest pain and palpitations  Gastrointestinal: Negative for abdominal pain and vomiting  Genitourinary: Negative for dysuria and hematuria  Musculoskeletal: Negative for arthralgias and back pain  Skin: Negative for color change and rash  Neurological: Negative for seizures and syncope  All other systems reviewed and are negative        Physical Exam     /83   Pulse 98   Ht 6' (1 829 m)   Wt 113 kg (250 lb)   BMI 33 91 kg/m²     Left Knee  Surgical incisions well healed without signs of infection  Range of motion from 0 to 125  There is no effusion  There is no tenderness over the medial joint line  The patient is ableperform a straight leg raise with quad strength improved from previous post op exam  Minimal to mild quad atrophy noted  The patient is neurovascular intact distally  Data Review     I have personally reviewed pertinent films in PACS, and my interpretation follows      No new images    Social History     Tobacco Use    Smoking status: Former Smoker     Packs/day: 1 00     Years: 26 00     Pack years: 26 00     Quit date: 2012     Years since quitting: 10 3    Smokeless tobacco: Never Used   Vaping Use    Vaping Use: Never used   Substance Use Topics    Alcohol use: Yes     Comment: 3-4 x year    Drug use: No           Procedures  None     Lara Chaves PA-C

## 2022-05-23 ENCOUNTER — OFFICE VISIT (OUTPATIENT)
Dept: PHYSICAL THERAPY | Age: 47
End: 2022-05-23
Payer: COMMERCIAL

## 2022-05-23 DIAGNOSIS — S83.241D TEAR OF MEDIAL MENISCUS OF RIGHT KNEE, CURRENT, UNSPECIFIED TEAR TYPE, SUBSEQUENT ENCOUNTER: Primary | ICD-10-CM

## 2022-05-23 PROCEDURE — 97110 THERAPEUTIC EXERCISES: CPT | Performed by: PHYSICAL THERAPIST

## 2022-05-23 PROCEDURE — 97140 MANUAL THERAPY 1/> REGIONS: CPT | Performed by: PHYSICAL THERAPIST

## 2022-05-23 NOTE — PROGRESS NOTES
Daily Note     Today's date: 2022  Patient name: Iris Tinoco  : 1975  MRN: [id number]  Referring provider: Santa Qureshi PA-C  Dx:   Encounter Diagnosis     ICD-10-CM    1  Tear of medial meniscus of right knee, current, unspecified tear type, subsequent encounter  S83 013D        Start Time: 0730  Stop Time: 0815  Total time in clinic (min): 45 minutes    Subjective: better      Objective: See treatment diary below      Assessment: Tolerated treatment fair  Patient demonstrated fatigue post treatment, exhibited good technique with therapeutic exercises and would benefit from continued PT, progressing nicely      Plan: Progress treatment as tolerated         Precautions: standard      Manuals                  MT right knee 10 10' 10'  10'  10' 10' 10' 10' 10                              Neuro Re-Ed                                                                                                        Ther Ex             Nu step 4 min 8' L4 L4 x10'  L6 x10' L6 10' L6 10'  L6 10'  L6 10' 10    Slant    L3 30''x4  L3 30''x4  L3 30''x4  L3 30''x4 L3 30''x4 L3 30''x4  4x    Hip add CYBEX  30# 30x 40# 30x 40# 30x 45# 30x 45# 30x  45# 30x  50# 30x 50/30    Hip abd CYBEX  30# 30x 40# 30x 40# 30x  45# 30x  45# 30x  45# 30x  50# 30x  50/30    Leg press (limit flex to 50 deg)   60# 30x  70# 30x 70# 30x  80# 30x  90# 30x  210# 30x 210# 30x  210/30    Calf press Cybex     40# 30x  60# 30x  70# 30x  80# 30x  110# 30x  110/30    SLR 2x10 3x10 3x10 3x10  2# 30x  5# 30x  5# 30x  6# 30  6/30    SAQ 2/30 3# 30x 4# 30x 5# 30 5# 30x 5# 30x 6# 30x 6# 30x 6/30    LAQ 2/30 3# 30x  4# 30x  5# 30x  5# 30x  5# 30x  6# 30x  6# 30x  6/30    Ball slides 30x 30x 30x  30x  30x  30x  30x 30x  30x    S/L hip abd   2x10 3x10 3x10 3x10  3x10 3x10 30x    Prone hip ext    2x10 3x10  3x10 3x10 3x10 3x10 30x                 Ther Activity             Step ups    4'' x20 6''x30 6" x30  6''x 6'' 30x 6'' 30x 30x    Lateral step up     4'' x30 6'' 30x  6'' 30x  6'' 30x 30x    Lateral step down with heel tap       4'' x10 6'' 30x  30x    Gait Training                                       Modalities                                          Daily Note     Today's date: 2022  Patient name: Janene Hurst  : 1975  MRN: [id number]  Referring provider: Ameya Reynolds PA-C  Dx:   Encounter Diagnosis     ICD-10-CM    1  Tear of medial meniscus of right knee, current, unspecified tear type, subsequent encounter  S83 241D        Start Time: 730  Stop Time: 815  Total time in clinic (min): 45 minutes    Subjective: better      Objective: See treatment diary below      Assessment: Tolerated treatment fair  Patient demonstrated fatigue post treatment, exhibited good technique with therapeutic exercises and would benefit from continued PT, progressing nicely      Plan: Progress treatment as tolerated         Precautions: standard      Manuals                 MT right knee 10 10' 10'  10'  10' 10' 10' 10' 10 10                             Neuro Re-Ed                                                                                                        Ther Ex             Nu step 4 min 8' L4 L4 x10'  L6 x10' L6 10' L6 10'  L6 10'  L6 10' 10 10   Slant    L3 30''x4  L3 30''x4  L3 30''x4  L3 30''x4 L3 30''x4 L3 30''x4  4x 4x   Hip add CYBEX  30# 30x 40# 30x 40# 30x 45# 30x 45# 30x  45# 30x  50# 30x 50/30 50/30   Hip abd CYBEX  30# 30x 40# 30x 40# 30x  45# 30x  45# 30x  45# 30x  50# 30x  50/30 50/30   Leg press (limit flex to 50 deg)   60# 30x  70# 30x 70# 30x  80# 30x  90# 30x  210# 30x 210# 30x  210/30 210/30   Calf press Cybex     40# 30x  60# 30x  70# 30x  80# 30x  110# 30x  110/30 110/30   SLR 2x10 3x10 3x10 3x10  2# 30x  5# 30x  5# 30x  6# 30  6/30 6/30   SAQ  3# 30x 4# 30x 5# 30 5# 30x 5# 30x 6# 30x 6# 30x 6/30 6/30   LAQ  3# 30x  4# 30x  5# 30x  5# 30x  5# 30x  6# 30x  6# 30x  6/30 6/30   Ball slides 30x 30x 30x  30x  30x  30x  30x 30x  30x 30x   S/L hip abd   2x10 3x10 3x10 3x10  3x10 3x10 30x 30x   Prone hip ext    2x10 3x10  3x10 3x10 3x10 3x10 30x 30x                Ther Activity             Step ups    4'' x20 6''x30 6" x30  6''x 6'' 30x 6'' 30x 30x 30x   Lateral step up     4'' x30 6'' 30x  6'' 30x  6'' 30x 30x 30x   Lateral step down with heel tap       4'' x10 6'' 30x  30x 30x   Gait Training                                       Modalities

## 2022-05-26 ENCOUNTER — APPOINTMENT (OUTPATIENT)
Dept: PHYSICAL THERAPY | Age: 47
End: 2022-05-26
Payer: COMMERCIAL

## 2022-05-31 ENCOUNTER — OFFICE VISIT (OUTPATIENT)
Dept: PHYSICAL THERAPY | Age: 47
End: 2022-05-31
Payer: COMMERCIAL

## 2022-05-31 DIAGNOSIS — S83.241D TEAR OF MEDIAL MENISCUS OF RIGHT KNEE, CURRENT, UNSPECIFIED TEAR TYPE, SUBSEQUENT ENCOUNTER: Primary | ICD-10-CM

## 2022-05-31 PROCEDURE — 97110 THERAPEUTIC EXERCISES: CPT

## 2022-05-31 NOTE — PROGRESS NOTES
Daily Note     Today's date: 2022  Patient name: Brandon Luna  : 1975  MRN: [id number]  Referring provider: Malcom Peña PA-C  Dx:   Encounter Diagnosis     ICD-10-CM    1  Tear of medial meniscus of right knee, current, unspecified tear type, subsequent encounter  S83 241D        Start Time: 0700  Stop Time: 0745  Total time in clinic (min): 45 minutes    Subjective: Reports no new concerns, no pain upon arrival        Objective: See treatment diary below      Assessment: Tolerated treatment well  Patient self progressed leg press without issues  Few cues needed for carryover of program  No c/o pain t/o session  Will review HEP next session to ensure independence  Patient exhibited good technique with therapeutic exercises  Plan: Continue per plan of care  Potential discharge next visit       Precautions: standard      Manuals                 MT right knee 10 10' 10'  10'  10' 10' 10' 10' 10 5'                             Neuro Re-Ed                                                                                                        Ther Ex             Nu step 4 min 8' L4 L4 x10'  L6 x10' L6 10' L6 10'  L6 10'  L6 10' 10 10'   Slant    L3 30''x4  L3 30''x4  L3 30''x4  L3 30''x4 L3 30''x4 L3 30''x4  4x 4x   Hip add CYBEX  30# 30x 40# 30x 40# 30x 45# 30x 45# 30x  45# 30x  50# 30x 50/30 70# 30x   Hip abd CYBEX  30# 30x 40# 30x 40# 30x  45# 30x  45# 30x  45# 30x  50# 30x  50/30 70# 30x    Leg press (limit flex to 50 deg)   60# 30x  70# 30x 70# 30x  80# 30x  90# 30x  210# 30x 210# 30x  210/30 290# 30x   Calf press Cybex     40# 30x  60# 30x  70# 30x  80# 30x  110# 30x  110/30 110# 30x    SLR 2x10 3x10 3x10 3x10  2# 30x  5# 30x  5# 30x  6# 30  6/30 6# 30x   SAQ 2/30 3# 30x 4# 30x 5# 30 5# 30x 5# 30x 6# 30x 6# 30x 6/30 6# 30x   LAQ 2/30 3# 30x  4# 30x  5# 30x  5# 30x  5# 30x  6# 30x  6# 30x  6/30 6# 30x    Ball slides 30x 30x 30x  30x  30x  30x 30x 30x  30x 30x   S/L hip abd   2x10 3x10 3x10 3x10  3x10 3x10 30x 30x   Prone hip ext    2x10 3x10  3x10 3x10 3x10 3x10 30x 30x                Ther Activity             Step ups    4'' x20 6''x30 6" x30  6''x 6'' 30x 6'' 30x 30x 30x   Lateral step up     4'' x30 6'' 30x  6'' 30x  6'' 30x 30x 30x   Lateral step down with heel tap       4'' x10 6'' 30x  30x 30x   Gait Training                                       Modalities

## 2022-06-02 ENCOUNTER — OFFICE VISIT (OUTPATIENT)
Dept: PHYSICAL THERAPY | Age: 47
End: 2022-06-02
Payer: COMMERCIAL

## 2022-06-02 DIAGNOSIS — S83.241D TEAR OF MEDIAL MENISCUS OF RIGHT KNEE, CURRENT, UNSPECIFIED TEAR TYPE, SUBSEQUENT ENCOUNTER: Primary | ICD-10-CM

## 2022-06-02 PROCEDURE — 97110 THERAPEUTIC EXERCISES: CPT

## 2022-06-02 NOTE — PROGRESS NOTES
Daily Note     Today's date: 2022  Patient name: Benjamin Vance  : 1975  MRN: [id number]  Referring provider: Eram Smith PA-C  Dx:   Encounter Diagnosis     ICD-10-CM    1  Tear of medial meniscus of right knee, current, unspecified tear type, subsequent encounter  S83 241D        Start Time: 06  Stop Time: 744  Total time in clinic (min): 48 minutes    Subjective: Reports no pain upon arrival  Occasional discomfort during sports (volleyball) when making quick turns, but subsides quickly  Patient plans to return to his regular gym and continue with an independent HEP  Patient reports no functional limitations at this time  Objective: See treatment diary below      Assessment: Tolerated treatment well  Patient is independent with his HEP at this time and exhibited good technique with therapeutic exercises  No c/o pain t/o session  Patient self progressed leg press today with good tolerance  Plan: Patient will be discharged by his primary PT        Precautions: standard      Manuals                 MT right knee 10'  10'  10'  10' 10' 10' 10' 10 5'                             Neuro Re-Ed                                                                                                        Ther Ex             Nu step 10' L6   L4 x10'  L6 x10' L6 10' L6 10'  L6 10'  L6 10' 10 10'   Slant  4x  L3 30''x4  L3 30''x4  L3 30''x4  L3 30''x4 L3 30''x4 L3 30''x4  4x 4x   Hip add CYBEX 70# 30x  40# 30x 40# 30x 45# 30x 45# 30x  45# 30x  50# 30x 50/30 70# 30x   Hip abd CYBEX 70# 30x  40# 30x 40# 30x  45# 30x  45# 30x  45# 30x  50# 30x  50/30 70# 30x    Leg press (limit flex to 50 deg)  350# 30x  70# 30x 70# 30x  80# 30x  90# 30x  210# 30x 210# 30x  210/30 290# 30x   Calf press Cybex  85# 30x    40# 30x  60# 30x  70# 30x  80# 30x  110# 30x  110/30 110# 30x    SLR 6# 30x  3x10 3x10  2# 30x  5# 30x  5# 30x  6# 30  6/30 6# 30x   SAQ 6# 30x  4# 30x 5# 30 5# 30x 5# 30x 6# 30x 6# 30x 6/30 6# 30x   LAQ 6# 30x  4# 30x  5# 30x  5# 30x  5# 30x  6# 30x  6# 30x  6/30 6# 30x    Ball slides 30x   30x  30x  30x  30x  30x 30x  30x 30x   S/L hip abd 30x  2x10 3x10 3x10 3x10  3x10 3x10 30x 30x   Prone hip ext  30x  2x10 3x10  3x10 3x10 3x10 3x10 30x 30x                Ther Activity             Step ups  6'' 30x  4'' x20 6''x30 6" x30  6''x 6'' 30x 6'' 30x 30x 30x   Lateral step up 6'' 30x    4'' x30 6'' 30x  6'' 30x  6'' 30x 30x 30x   Lateral step down with heel tap 6'' 30x       4'' x10 6'' 30x  30x 30x   Gait Training                                       Modalities

## 2022-06-06 ENCOUNTER — APPOINTMENT (OUTPATIENT)
Dept: PHYSICAL THERAPY | Age: 47
End: 2022-06-06
Payer: COMMERCIAL

## 2022-06-09 ENCOUNTER — APPOINTMENT (OUTPATIENT)
Dept: PHYSICAL THERAPY | Age: 47
End: 2022-06-09
Payer: COMMERCIAL

## 2022-10-24 ENCOUNTER — TELEPHONE (OUTPATIENT)
Dept: OBGYN CLINIC | Facility: CLINIC | Age: 47
End: 2022-10-24

## 2022-10-24 NOTE — TELEPHONE ENCOUNTER
Received a request via [facility] to make an appointment for B/L knee pain, he was scheduled for 11/03/2022 for 230pm

## 2022-11-03 ENCOUNTER — OFFICE VISIT (OUTPATIENT)
Dept: OBGYN CLINIC | Facility: CLINIC | Age: 47
End: 2022-11-03

## 2022-11-03 VITALS
WEIGHT: 249 LBS | BODY MASS INDEX: 33.72 KG/M2 | DIASTOLIC BLOOD PRESSURE: 82 MMHG | HEART RATE: 100 BPM | SYSTOLIC BLOOD PRESSURE: 152 MMHG | HEIGHT: 72 IN

## 2022-11-03 DIAGNOSIS — M25.562 ACUTE PAIN OF LEFT KNEE: ICD-10-CM

## 2022-11-03 DIAGNOSIS — M25.462 EFFUSION OF LEFT KNEE: ICD-10-CM

## 2022-11-03 DIAGNOSIS — Z98.890 S/P RIGHT KNEE ARTHROSCOPY: Primary | ICD-10-CM

## 2022-11-03 RX ORDER — METHYLPREDNISOLONE ACETATE 40 MG/ML
2 INJECTION, SUSPENSION INTRA-ARTICULAR; INTRALESIONAL; INTRAMUSCULAR; SOFT TISSUE
Status: COMPLETED | OUTPATIENT
Start: 2022-11-03 | End: 2022-11-03

## 2022-11-03 RX ORDER — LIDOCAINE HYDROCHLORIDE 10 MG/ML
2 INJECTION, SOLUTION INFILTRATION; PERINEURAL
Status: COMPLETED | OUTPATIENT
Start: 2022-11-03 | End: 2022-11-03

## 2022-11-03 RX ORDER — BUPIVACAINE HYDROCHLORIDE 2.5 MG/ML
2 INJECTION, SOLUTION INFILTRATION; PERINEURAL
Status: COMPLETED | OUTPATIENT
Start: 2022-11-03 | End: 2022-11-03

## 2022-11-03 RX ORDER — LEVOTHYROXINE SODIUM 0.03 MG/1
TABLET ORAL
COMMUNITY
Start: 2022-10-22

## 2022-11-03 RX ADMIN — METHYLPREDNISOLONE ACETATE 2 ML: 40 INJECTION, SUSPENSION INTRA-ARTICULAR; INTRALESIONAL; INTRAMUSCULAR; SOFT TISSUE at 15:17

## 2022-11-03 RX ADMIN — LIDOCAINE HYDROCHLORIDE 2 ML: 10 INJECTION, SOLUTION INFILTRATION; PERINEURAL at 15:17

## 2022-11-03 RX ADMIN — BUPIVACAINE HYDROCHLORIDE 2 ML: 2.5 INJECTION, SOLUTION INFILTRATION; PERINEURAL at 15:17

## 2022-11-03 NOTE — PROGRESS NOTES
Patient Name:  Fabian Mo  MRN:  [id number]    [address] [place] [address]     1  S/P right knee arthroscopy  -     Ambulatory Referral to Physical Therapy; Future    2  Acute pain of left knee  -     Ambulatory Referral to Physical Therapy; Future  -     Large joint arthrocentesis: L knee    3  Effusion of left knee  -     Ambulatory Referral to Physical Therapy; Future  -     Large joint arthrocentesis: L knee        55 y o  male with Bilateral knee pain, approximately 7 month(s) status post right knee arthroscopy with partial medial meniscectomy on 4/8/2022 with recent atraumatic left knee pain with effusion  The patient is overall doing well following his right knee arthroscopy  At this time I think he would benefit from additional physical therapy; a prescription was provided  I have discussed it to prescribe physical therapy in an effort to decrease pain, improve strength, and improve flexibility  The patient was offered an aspiration and corticosteroid injection to his left knee  Risks and benefits of aspiration and corticosteroid injection were discussed in detail  Risks including:  Post injection pain, elevation in blood sugar, skin discoloration, fatty atrophy, and infection were discussed in detail  The patient understood and elected to proceed forward  After sterile preparation the left knee was aspirated and 7 5cc of clear, yellow, cloudy fluid were removed  This was followed by  injection with 2 cc of 1% lidocaine, 2 cc of 0 25% bupivacaine, and 2 cc of Depo-Medrol  The patient tolerated the procedure and no immediate complications were noted  The patient was instructed to ice and elevate the injection site, limit strenuous activity for the next 24-48 hours, and contact us if there were any questions or concerns prior to their follow-up appointment  They were also instructed to contact their primary care physician to discuss elevated blood sugar    I will see the patient back in 8 weeks for re-evaluation and we will obtain new x-rays of bilateral knees if pain persists or worsens  History of the Present Illness   Adrienne oRb is a 55 y o   male who presents approximately 7 month(s) status post ARTHROSCOPY KNEE- Right Knee partial medial menisectomy - Right on 4/8/2022, and left knee follow-up  Patient was last seen in the office on 05/16/2022 where patient was told he can transition to home exercise plan, avoid flexion greater than 90° during back squats, avoid aggressive free weight exercises when 1st getting back to weight training  He has pain when standing up  He notes the right knee at times feels bone on bone  or bruised  He reports he feels great improvement in his symptoms since surgery  He reports the left patella clicks  Review of Systems     Review of Systems   Constitutional: Negative for appetite change and unexpected weight change  HENT: Negative for congestion and trouble swallowing  Eyes: Negative for visual disturbance  Respiratory: Negative for cough and shortness of breath  Cardiovascular: Negative for chest pain and palpitations  Gastrointestinal: Negative for nausea and vomiting  Endocrine: Negative for cold intolerance and heat intolerance  Musculoskeletal: Positive for myalgias  Negative for joint swelling  Skin: Negative for rash  Neurological: Negative for numbness  Physical Exam     /82   Pulse 100   Ht 6' (1 829 m)   Wt 113 kg (249 lb)   BMI 33 77 kg/m²     Left  Knee  Range of motion from 0 to 130 with mild crepitus  There is trace effusion  There is tenderness over the medial joint line  The patient is able to perform a straight leg raise, 5/5 quad strength  Varus stress testing reveals no at 0 and 30 degrees   Valgus stress testing reveals no at 0 and 30 degrees  Julio C's test is negative  The patient is neurovascular intact distally      Right Knee  Incision is well healed  Range of motion from 0 to 130 with mild crepitus  There is no effusion  There is tenderness over the distal MCL, medial femoral condyle  The patient is able to perform a straight leg raise  Varus stress testing reveals no at 0 and 30 degrees   Valgus stress testing reveals no at 0 and 30 degrees  Julio C's test is negative  The patient is neurovascular intact distally  Data Review     I have personally reviewed pertinent films in PACS, and my interpretation follows  No new images at today's appointment  Social History     Tobacco Use   • Smoking status: Former Smoker     Packs/day: 1 00     Years: 26 00     Pack years: 26 00     Quit date: 2012     Years since quitting: 10 8   • Smokeless tobacco: Never Used   Vaping Use   • Vaping Use: Never used   Substance Use Topics   • Alcohol use: Yes     Comment: 3-4 x year   • Drug use: No           Large joint arthrocentesis: L knee  Universal Protocol:  Consent: Verbal consent obtained  Risks and benefits: risks, benefits and alternatives were discussed  Consent given by: patient  Time out: Immediately prior to procedure a "time out" was called to verify the correct patient, procedure, equipment, support staff and site/side marked as required    Timeout called at: 11/3/2022 3:17 PM   Patient understanding: patient states understanding of the procedure being performed  Site marked: the operative site was marked  Patient identity confirmed: verbally with patient    Supporting Documentation  Indications: pain   Procedure Details  Location: knee - L knee  Preparation: Patient was prepped and draped in the usual sterile fashion  Needle size: 22 G  Ultrasound guidance: no  Approach: anterolateral  Medications administered: 2 mL lidocaine 1 %; 2 mL methylPREDNISolone acetate 40 mg/mL; 2 mL bupivacaine 0 25 %    Aspirate amount: 7 5 mL  Aspirate: clear, yellow and cloudy    Patient tolerance: patient tolerated the procedure well with no immediate complications  Dressing:  Sterile dressing applied          Shaniqua Kaur DO     Scribe Attestation    I,:  Maricruz Mcduffie am acting as a scribe while in the presence of the attending physician :       I,:  Shaniqua Kaur DO personally performed the services described in this documentation    as scribed in my presence :

## 2022-11-29 ENCOUNTER — EVALUATION (OUTPATIENT)
Dept: PHYSICAL THERAPY | Age: 47
End: 2022-11-29

## 2022-11-29 DIAGNOSIS — M25.562 ACUTE PAIN OF LEFT KNEE: ICD-10-CM

## 2022-11-29 DIAGNOSIS — M25.462 EFFUSION OF LEFT KNEE: ICD-10-CM

## 2022-11-29 DIAGNOSIS — Z98.890 S/P RIGHT KNEE ARTHROSCOPY: ICD-10-CM
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
## 2022-12-01 ENCOUNTER — APPOINTMENT (OUTPATIENT)
Dept: PHYSICAL THERAPY | Age: 47
End: 2022-12-01

## 2022-12-05 ENCOUNTER — OFFICE VISIT (OUTPATIENT)
Dept: PHYSICAL THERAPY | Age: 47
End: 2022-12-05

## 2022-12-05 DIAGNOSIS — M25.462 EFFUSION OF LEFT KNEE: ICD-10-CM

## 2022-12-05 DIAGNOSIS — Z98.890 S/P RIGHT KNEE ARTHROSCOPY: Primary | ICD-10-CM

## 2022-12-05 DIAGNOSIS — M25.562 ACUTE PAIN OF LEFT KNEE: ICD-10-CM

## 2022-12-05 NOTE — PROGRESS NOTES
Daily Note     Today's date: 2022  Patient name: Sharon López  : 1975  MRN: [id number]  Referring provider: Rena Saldivar DO  Dx:   Encounter Diagnosis     ICD-10-CM    1  S/P right knee arthroscopy  Z98 890       2  Acute pain of left knee  M25 562       3  Effusion of left knee  M25 462           Start Time:   Stop Time: 0730  Total time in clinic (min): 40 minutes    Subjective: Patient reports he didn't notice anything with KT tape on his knees  Objective: See treatment diary below      Assessment: Tolerated treatment well  Patient would benefit from continued PT  Started laser to both knees today  Instructed in SLR and SLR w/ ER for HEP  Plan: Continue per plan of care        Precautions: R arthroscopy 2022, GERD, R RTC repair,      Daily Treatment Diary:  Manuals            B LE's 8 nt                        Laser R knee nv Prot s/s 6'           Laser L knee  Prot s/s6'           Neuro Re-Ed             KT Tape R Med knee 5 X med 5'           KT tape L Infra patella 5 patella sup 5'                                                                            Ther Ex             HEP 8            Slant board              Nustep  6' L6           SLR             SLR w/ER             Bridge w/adduction                                       Ther Activity                                       Gait Training                                       Modalities             Ice after L knee nt

## 2022-12-08 ENCOUNTER — OFFICE VISIT (OUTPATIENT)
Dept: PHYSICAL THERAPY | Age: 47
End: 2022-12-08

## 2022-12-08 DIAGNOSIS — Z98.890 S/P RIGHT KNEE ARTHROSCOPY: Primary | ICD-10-CM

## 2022-12-08 DIAGNOSIS — M25.562 ACUTE PAIN OF LEFT KNEE: ICD-10-CM

## 2022-12-08 DIAGNOSIS — M25.462 EFFUSION OF LEFT KNEE: ICD-10-CM

## 2022-12-08 NOTE — PROGRESS NOTES
Daily Note     Today's date: 2022  Patient name: Gerson Arguello  : 1975  MRN: [id number]  Referring provider: Amor Reyes DO  Dx:   Encounter Diagnosis     ICD-10-CM    1  S/P right knee arthroscopy  Z98 890       2  Acute pain of left knee  M25 562       3  Effusion of left knee  M25 462           Start Time: 0645  Stop Time: 0715  Total time in clinic (min): 30 minutes    Subjective: Patient reports he is not seeing any improvement in his knees  Objective: See treatment diary below      Assessment: Tolerated treatment well  Patient would benefit from continued PT  Patient declined KT tape today  Did not have time to complete program today  C/o pain medial right knee with laser  Plan: Continue per plan of care        Precautions: R arthroscopy 2022, GERD, R RTC repair,      Daily Treatment Diary:  Manuals           B LE's 8 nt 8'                       Laser R knee nv Prot s/s 6' 6'          Laser L knee  Prot s/s6' 6'          Neuro Re-Ed             KT Tape R Med knee 5 X med 5' decline          KT tape L Infra patella 5 patella sup 5' decline                                                                           Ther Ex             HEP 8            Slant board              Nustep  6' L6           SLR B   30x ea          SLR w/ER B   30x          Bridge w/adduction   30x                                    Ther Activity                                       Gait Training                                       Modalities             Ice after L knee nt

## 2022-12-12 ENCOUNTER — OFFICE VISIT (OUTPATIENT)
Dept: PHYSICAL THERAPY | Age: 47
End: 2022-12-12

## 2022-12-12 DIAGNOSIS — M25.462 EFFUSION OF LEFT KNEE: ICD-10-CM

## 2022-12-12 DIAGNOSIS — M25.562 ACUTE PAIN OF LEFT KNEE: ICD-10-CM

## 2022-12-12 DIAGNOSIS — Z98.890 S/P RIGHT KNEE ARTHROSCOPY: Primary | ICD-10-CM

## 2022-12-12 NOTE — PROGRESS NOTES
Daily Note     Today's date: 2022  Patient name: Ashley Kong  : 1975  MRN: [id number]  Referring provider: Katherine Alvarado DO  Dx:   Encounter Diagnosis     ICD-10-CM    1  S/P right knee arthroscopy  Z98 890       2  Acute pain of left knee  M25 562       3  Effusion of left knee  M25 462           Start Time: 730  Stop Time: 0810  Total time in clinic (min): 40 minutes     Subjective: Patient reports no new complaints  Patient states the knees feel the same  No difference  Objective: See treatment diary below      Assessment: Tolerated treatment well  Roman Mullen participated in skilled PT session focused on strengthening, stretching, and ROM  Patient experiences no pain with stretching or exercises  Patient would continue to benefit from skilled PT interventions to address strengthening, stretching, and ROM  Patient demonstrated fatigue post treatment      Plan: Continue per plan of care        Precautions: R arthroscopy 2022, GERD, R RTC repair,      Daily Treatment Diary:  Manuals          B LE's 8 nt 8' 8'                      Laser R knee nv Prot s/s 6' 6' 6'         Laser L knee  Prot s/s6' 6' 6'         Neuro Re-Ed             KT Tape R Med knee 5 X med 5' decline          KT tape L Infra patella 5 patella sup 5' decline                                                                           Ther Ex             HEP 8            Slant board              Nustep  6' L6           SLR B   30x ea X 30 ea         SLR w/ER B   30x X 30 ea         Bridge w/adduction   30x X 30                                   Ther Activity                                       Gait Training                                       Modalities             Ice after L knee nt

## 2022-12-15 ENCOUNTER — OFFICE VISIT (OUTPATIENT)
Dept: PHYSICAL THERAPY | Age: 47
End: 2022-12-15

## 2022-12-15 DIAGNOSIS — Z98.890 S/P RIGHT KNEE ARTHROSCOPY: Primary | ICD-10-CM

## 2022-12-15 DIAGNOSIS — M25.562 ACUTE PAIN OF LEFT KNEE: ICD-10-CM

## 2022-12-15 DIAGNOSIS — M25.462 EFFUSION OF LEFT KNEE: ICD-10-CM

## 2022-12-15 NOTE — PROGRESS NOTES
Daily Note     Today's date: 12/15/2022  Patient name: Radha Estrada  : 1975  MRN: [id number]  Referring provider: Yfn Albright DO  Dx:   Encounter Diagnosis     ICD-10-CM    1  S/P right knee arthroscopy  Z98 890       2  Acute pain of left knee  M25 562       3  Effusion of left knee  M25 462           Start Time: 0700  Stop Time: 7528  Total time in clinic (min): 44 minutes    Subjective: Patient reports the stretching helps but temporary  He states the tape or laser didn't do anything  Objective: See treatment diary below      Assessment: Tolerated treatment well  Patient would benefit from continued PT  Advised patient to do stretches and exercises to strengthen his knees  Plan: Continue per plan of care        Precautions: R arthroscopy 2022, GERD, R RTC repair,      Daily Treatment Diary:  Manuals 11/29 12/5 12/8 12/12 12/15        B LE's 8 nt 8' 8' 8'                     Laser R knee nv Prot s/s 6' 6' 6'         Laser L knee  Prot s/s6' 6' 6'         Neuro Re-Ed             KT Tape R Med knee 5 X med 5' decline          KT tape L Infra patella 5 patella sup 5' decline          Ther Ex             HEP 8            Slant board     30' 4x         Nustep  6' L6   L6 8'        SLR B   30x ea X 30 ea 30x        SLR w/ER B   30x X 30 ea 30x        Bridge w/adduction   30x X 30 30x        Wall sits     30x        Hip abduction     nv        Hip adduction     nv                     Ther Activity                                       Gait Training                                       Modalities             Ice after L knee nt

## 2022-12-19 ENCOUNTER — OFFICE VISIT (OUTPATIENT)
Dept: PHYSICAL THERAPY | Age: 47
End: 2022-12-19

## 2022-12-19 DIAGNOSIS — M25.462 EFFUSION OF LEFT KNEE: ICD-10-CM

## 2022-12-19 DIAGNOSIS — Z98.890 S/P RIGHT KNEE ARTHROSCOPY: Primary | ICD-10-CM

## 2022-12-19 DIAGNOSIS — M25.562 ACUTE PAIN OF LEFT KNEE: ICD-10-CM

## 2022-12-19 NOTE — PROGRESS NOTES
Daily Note     Today's date: 2022  Patient name: Kristin Marrero  : 1975  MRN: [id number]  Referring provider: Abi Montenegro DO  Dx:   Encounter Diagnosis     ICD-10-CM    1  S/P right knee arthroscopy  Z98 890       2  Acute pain of left knee  M25 562       3  Effusion of left knee  M25 462           Start Time: 1800  Stop Time: 1847  Total time in clinic (min): 47 minutes    Subjective: Patient reports that he had mod pain over the weekend 2* to increased ambulation duration  Patient states he has seen min effect from laser  Patient states that stretching helps but effects of pain relief are not long lasting  "By the time I walk back to my car I feel the pain again in the R & L (medial knee)  " Patient notes crepitus in b/l knees with flexion  Objective: See treatment diary below      Assessment: Laser held this visit  If patient self reports worsening condition of b/l knees w/o laser, re-introduce laser NV  On palpation, swelling noted on R>L medial knee with tenderness to touch  Add/abd added this visit with no adverse effects  Patient demonstrated most tenderness at b/l knee joint line at 90* during wall slides  Patient would benefit from continued PT  Plan: Continue per plan of care  Precautions: R arthroscopy 2022, GERD, R RTC repair,      Daily Treatment Diary:  Manuals 11/29 12/5 12/8 12/12 12/15 12/19       B LE's 8 nt 8' 8' 8' 10'                    Laser R knee nv Prot s/s 6' 6' 6'  NV? Laser L knee  Prot s/s6' 6' 6'  NV?        Neuro Re-Ed             KT Tape R Med knee 5 X med 5' decline          KT tape L Infra patella 5 patella sup 5' decline          Ther Ex             HEP 8            Slant board     30' 4x 30' 4x        Nustep  6' L6   L6 8' L6 8'       SLR B   30x ea X 30 ea 30x 30x       SLR w/ER B   30x X 30 ea 30x 30x       Bridge w/adduction   30x X 30 30x 30x       Wall sits     30x PBall 3x10       Hip abduction     nv Supine x30, 5" CYBEX      Hip adduction     nv X30, 5" BTB CYBEX                   Ther Activity                                       Gait Training                                       Modalities             Ice after L knee nt

## 2022-12-22 ENCOUNTER — APPOINTMENT (OUTPATIENT)
Dept: PHYSICAL THERAPY | Age: 47
End: 2022-12-22

## 2022-12-22 DIAGNOSIS — M25.462 EFFUSION OF LEFT KNEE: ICD-10-CM

## 2022-12-22 DIAGNOSIS — Z98.890 S/P RIGHT KNEE ARTHROSCOPY: Primary | ICD-10-CM

## 2022-12-22 DIAGNOSIS — M25.562 ACUTE PAIN OF LEFT KNEE: ICD-10-CM

## 2022-12-22 NOTE — PROGRESS NOTES
Daily Note     Today's date: 2022  Patient name: Nikia Pickett  : 1975  MRN: [id number]  Referring provider: Tanisha Luciano DO  Dx:   Encounter Diagnosis     ICD-10-CM    1  S/P right knee arthroscopy  Z98 890       2  Acute pain of left knee  M25 562       3  Effusion of left knee  M25 462                      Subjective: ***      Objective: See treatment diary below      Assessment: Tolerated treatment {Tolerated treatment :9554128413}  Patient {assessment:[id number]}      Plan: {PLAN:[id number]}     Precautions: R arthroscopy 2022, GERD, R RTC repair,      Daily Treatment Diary:  Manuals 11/29 12/5 12/8 12/12 12/15 12/19       B LE's 8 nt 8' 8' 8' 10'                    Laser R knee nv Prot s/s 6' 6' 6'  NV? Laser L knee  Prot s/s6' 6' 6'  NV?        Neuro Re-Ed             KT Tape R Med knee 5 X med 5' decline          KT tape L Infra patella 5 patella sup 5' decline          Ther Ex             HEP 8            Slant board     30' 4x 30' 4x        Nustep  6' L6   L6 8' L6 8'       SLR B   30x ea X 30 ea 30x 30x       SLR w/ER B   30x X 30 ea 30x 30x       Bridge w/adduction   30x X 30 30x 30x       Wall sits     30x PBall 3x10       Hip abduction     nv Supine x30, 5" CYBEX      Hip adduction     nv X30, 5" BTB CYBEX                   Ther Activity                                       Gait Training                                       Modalities             Ice after L knee nt

## 2022-12-23 ENCOUNTER — OFFICE VISIT (OUTPATIENT)
Dept: PHYSICAL THERAPY | Age: 47
End: 2022-12-23

## 2022-12-23 DIAGNOSIS — M25.462 EFFUSION OF LEFT KNEE: ICD-10-CM

## 2022-12-23 DIAGNOSIS — Z98.890 S/P RIGHT KNEE ARTHROSCOPY: Primary | ICD-10-CM

## 2022-12-23 DIAGNOSIS — M25.562 ACUTE PAIN OF LEFT KNEE: ICD-10-CM

## 2022-12-23 NOTE — PROGRESS NOTES
Daily Note     Today's date: 2022  Patient name: Nicolette Ledesma  : 1975  MRN: [id number]  Referring provider: Ernesto Powell DO  Dx:   Encounter Diagnosis     ICD-10-CM    1  S/P right knee arthroscopy  Z98 890       2  Acute pain of left knee  M25 562       3  Effusion of left knee  M25 462           Start Time: 0700  Stop Time: 3890  Total time in clinic (min): 47 minutes    Subjective: patient reports same "irritating" pains left knee suprapatella and bilateral medial knees  Objective: See treatment diary below      Assessment: Tolerated treatment well  Patient would benefit from continued PT  Slight crepitus left PF joint  He states sometimes it catches and sharper pains while walking  Plan: Continue per plan of care  Precautions: R arthroscopy 2022, GERD, R RTC repair,      Daily Treatment Diary:  Manuals 11/29 12/5 12/8 12/12 12/15 12/19 12/23      B LE's 8 nt 8' 8' 8' 10' 10'                   Laser R knee nv Prot s/s 6' 6' 6'  NV? Laser L knee  Prot s/s6' 6' 6'  NV?        Neuro Re-Ed             KT Tape R Med knee 5 X med 5' decline          KT tape L Infra patella 5 patella sup 5' decline          Ther Ex             HEP 8            Slant board     30' 4x 30' 4x 4x       Nustep  6' L6   L6 8' L6 8' 10' L7      SLR B   30x ea X 30 ea 30x 30x 3# 30x      SLR w/ER B   30x X 30 ea 30x 30x 3# 30x      Bridge w/adduction   30x X 30 30x 30x 30x      Wall sits     30x PBall 3x10       Hip abduction     nv Supine x30, 5" CYBEX 70# 30x      Hip adduction     nv X30, 5" BTB CYBEX 70# 30x      B SAQ       5# 30x      B LAQ       5# 30x      Leg press             Ther Activity                                       Gait Training                                       Modalities             Ice after L knee nt

## 2022-12-27 ENCOUNTER — OFFICE VISIT (OUTPATIENT)
Dept: PHYSICAL THERAPY | Age: 47
End: 2022-12-27

## 2022-12-27 DIAGNOSIS — M25.462 EFFUSION OF LEFT KNEE: ICD-10-CM

## 2022-12-27 DIAGNOSIS — Z98.890 S/P RIGHT KNEE ARTHROSCOPY: Primary | ICD-10-CM

## 2022-12-27 DIAGNOSIS — M25.562 ACUTE PAIN OF LEFT KNEE: ICD-10-CM

## 2022-12-27 NOTE — PROGRESS NOTES
Daily Note     Today's date: 2022  Patient name: Fran Menchaca  : 1975  MRN: [id number]  Referring provider: Erin Jloly DO  Dx:   Encounter Diagnosis     ICD-10-CM    1  S/P right knee arthroscopy  Z98 890       2  Acute pain of left knee  M25 562       3  Effusion of left knee  M25 462                      Subjective: No new changes since LV  Objective: See treatment diary below      Assessment: Tolerated treatment fair  No change is sx presentation post session  Good quad activation noted with SLR, completing without lag  approprietaly challenged with PREs  C/o pattellar pain with LAQ, with and without weight, abolished with medial glide  Patient declined taping to promote pattellar trackting  Limited hip flexor and quad extensibility noted, L>R  Progress as able  Plan: Continue per plan of care  Precautions: R arthroscopy 2022, GERD, R RTC repair,      Daily Treatment Diary:  Manuals 11/29 12/5 12/8 12/12 12/15 12/19 12/23 12/27     B LE's 8 nt 8' 8' 8' 10' 10' 10'                  Laser R knee nv Prot s/s 6' 6' 6'  NV? Laser L knee  Prot s/s6' 6' 6'  NV?        Neuro Re-Ed             KT Tape R Med knee 5 X med 5' decline          KT tape L Infra patella 5 patella sup 5' decline          Ther Ex             HEP 8            Slant board     30' 4x 30' 4x 4x 30' 4x      Nustep  6' L6   L6 8' L6 8' 10' L7 10' L7     SLR B   30x ea X 30 ea 30x 30x 3# 30x 3# 30x     SLR w/ER B   30x X 30 ea 30x 30x 3# 30x 3# 30x     Bridge w/adduction   30x X 30 30x 30x 30x 30x     Wall sits     30x PBall 3x10       Hip abduction     nv Supine x30, 5" CYBEX 70# 30x CYBEX 70# 30x     Hip adduction     nv X30, 5" BTB CYBEX 70# 30x CYBEX 70# 30x     B SAQ       5# 30x 5# 30x     B LAQ       5# 30x 5# 30x     Leg press             Ther Activity                                       Gait Training                                       Modalities             Ice after L knee nt

## 2022-12-30 ENCOUNTER — OFFICE VISIT (OUTPATIENT)
Dept: PHYSICAL THERAPY | Age: 47
End: 2022-12-30

## 2022-12-30 DIAGNOSIS — M25.462 EFFUSION OF LEFT KNEE: ICD-10-CM

## 2022-12-30 DIAGNOSIS — M25.562 ACUTE PAIN OF LEFT KNEE: ICD-10-CM

## 2022-12-30 DIAGNOSIS — Z98.890 S/P RIGHT KNEE ARTHROSCOPY: Primary | ICD-10-CM

## 2022-12-30 NOTE — PROGRESS NOTES
Daily Note     Today's date: 2022  Patient name: Marah Wheeler   : 1975  MRN: [id number]  Referring provider: Gerard Sepulveda DO  Dx:   Encounter Diagnosis     ICD-10-CM    1  S/P right knee arthroscopy  Z98 890       2  Acute pain of left knee  M25 562       3  Effusion of left knee  M25 462           Start Time: 0930  Stop Time: 1025  Total time in clinic (min): 55 minutes    Subjective: Patient reports that his knees feel the same  Not better, but not worse  Objective: See treatment diary below      Assessment: Tolerated treatment well  Jessy Palomo participated in skilled PT session focused on strengthening, stretching, and ROM  Good quad activation noted with SLR, completing without lag  approprietaly challenged with PREs  Experiences slight knee pain in L during LAQs and SAQs  Patient demonstrated fatigue post treatment and exhibited good technique with therapeutic exercises      Plan: See Referring PT     Precautions: R arthroscopy 2022, GERD, R RTC repair,      Daily Treatment Diary:  Manuals 11/29 12/5 12/8 12/12 12/15 12/19 12/23 12/27 12/30    B LE's 8 nt 8' 8' 8' 10' 10' 10' X 10'                 Laser R knee nv Prot s/s 6' 6' 6'  NV? Laser L knee  Prot s/s6' 6' 6'  NV?        Neuro Re-Ed             KT Tape R Med knee 5 X med 5' decline          KT tape L Infra patella 5 patella sup 5' decline          Ther Ex             HEP 8            Slant board     30' 4x 30' 4x 4x 30' 4x 30"x4     Nustep  6' L6   L6 8' L6 8' 10' L7 10' L7 L7 x 10 min    SLR B   30x ea X 30 ea 30x 30x 3# 30x 3# 30x 3# B/L AW x 30    SLR w/ER B   30x X 30 ea 30x 30x 3# 30x 3# 30x 3# B/L AW x 30    Bridge w/adduction   30x X 30 30x 30x 30x 30x X 30    Wall sits     30x PBall 3x10       Hip abduction     nv Supine x30, 5" CYBEX 70# 30x CYBEX 70# 30x CYBEX 70# 30x    Hip adduction     nv X30, 5" BTB CYBEX 70# 30x CYBEX 70# 30x CYBEX 70# 30x    B SAQ       5# 30x 5# 30x 5# B/L AW x 30    B LAQ       5# 30x 5# 30x 5# B/L AW x 30    Leg press             Ther Activity                                       Gait Training                                       Modalities             Ice after L knee nt

## 2022-12-30 NOTE — PROGRESS NOTES
Patient completed a month of PT with no relief in either knee from pain and symptoms  Patient is referred back to MD and has appt next week  D/c PT at this time

## 2023-01-05 ENCOUNTER — OFFICE VISIT (OUTPATIENT)
Dept: OBGYN CLINIC | Facility: CLINIC | Age: 48
End: 2023-01-05

## 2023-01-05 VITALS
SYSTOLIC BLOOD PRESSURE: 119 MMHG | HEART RATE: 103 BPM | BODY MASS INDEX: 33.72 KG/M2 | WEIGHT: 249 LBS | HEIGHT: 72 IN | DIASTOLIC BLOOD PRESSURE: 86 MMHG

## 2023-01-05 DIAGNOSIS — Z98.890 S/P RIGHT KNEE ARTHROSCOPY: ICD-10-CM

## 2023-01-05 DIAGNOSIS — M25.462 EFFUSION OF LEFT KNEE: ICD-10-CM

## 2023-01-05 DIAGNOSIS — M23.92 INTERNAL DERANGEMENT OF LEFT KNEE: Primary | ICD-10-CM

## 2023-01-05 NOTE — PROGRESS NOTES
Patient Name:  Zeenat Brown  MRN:  [id number]    [address] [place] [address]     1  Internal derangement of left knee  -     MRI knee left  wo contrast; Future; Expected date: 01/05/2023    2  S/P right knee arthroscopy    3  Effusion of left knee        52 y o  male approximately 9 months s/p Right knee arthroscopic partial medial menisectomy performed on 04/08/2022 and Left knee internal derangement  Overall, patient doing very well s/p Right knee arthroscopy with mild residual medial knee pain and improved with stretching  Advised patient to continue stretching and performance of home exercises as previously discussed with outpatient PT  In light of his Left knee pain, recurrent effusions, persistent pain despite injection therapy and outpatient PT, and will move forward with MRI of Left knee for evaluation of internal derangement, rule out meniscus tear and evaluate cartilage surfaces  In the meantime, advised patient to hold off on twisting, pivoting and deep knee flexion until MRI resulted  Continue OTC oral analgesics and ice application  He will follow up in office after MRI resulted  History of the Present Illness   Zeenat Brown is a 52 y o  male approximately 9 months s/p Right knee arthroscopic partial medial menisectomy performed on 04/08/2022 and Left knee internal derangement  Today, patient reports his Right knee is "100% better" compared to before surgery with some residual medial knee pain  He admits to symptom resolution with stretching and he continues these as needed  In regards to Left knee pain, he admits to persistent pain within the joint  He does admit to locking and swelling  He admits to some giving away symptoms and feeling like it "collapses"  He does admit to pain relief with most recent aspiration and corticosteroid injection, but symptoms return within a few weeks  Review of Systems     Review of Systems   Constitutional: Negative for chills and fever     HENT: Negative for ear pain and sore throat  Eyes: Negative for pain and visual disturbance  Respiratory: Negative for cough and shortness of breath  Cardiovascular: Negative for chest pain and palpitations  Gastrointestinal: Negative for abdominal pain and vomiting  Genitourinary: Negative for dysuria and hematuria  Musculoskeletal: Negative for arthralgias and back pain  Skin: Negative for color change and rash  Neurological: Negative for seizures and syncope  All other systems reviewed and are negative  Physical Exam     /86   Pulse 103   Ht 6' (1 829 m)   Wt 113 kg (249 lb)   BMI 33 77 kg/m²     Right Knee  Previous surgical incisions well healed  luis carlos of motion from 0 to 130 degrees without pain  There is no crepitus with range of motion  There is no effusion  There is no tenderness over the medial joint  There is 5/5 quadriceps strength and improved from tone from previous appointments  The patient is able to perform a straight leg raise  negative patellar grind test   Anterior drawer tests is negative  negative Lachman Test    Posterior drawer test is   negative   Varus stress testing reveals no instability at 0 and 30 degrees   Valgus stress testing reveals no instability at 0 and 30 degrees  Amelia's testing is negative   The patient is neurovascular intact distally  Left Knee  Range of motion from 0 to 125 with pain at terminal flexion  There is patellofemoral crepitus with range of motion  There is mild effusion  There is tenderness over the medial or lateral joint line  There is 5/5 quadriceps strength and preserved tone  The patient is able to perform a straight leg raise        positive  patellar grind test   Anterior drawer tests is negative  negative Lachman Test    Posterior drawer test is   negative   Varus stress testing reveals no instability at 0 and 30 degrees   Valgus stress testing reveals no instability at 0 and 30 degrees  Amelia's testing is positive    Thessaly test positive  The patient is neurovascular intact distally  Data Review     I have personally reviewed pertinent films in PACS, and my interpretation follows  X-rays taken 2021 of Left  knee demonstrates well maintained joint spaces without evidence of acute fracture or dislocation  No new images at today's appointment      Social History     Tobacco Use   • Smoking status: Former     Packs/day: 1 00     Years: 26 00     Pack years: 26 00     Types: Cigarettes     Quit date:      Years since quittin 0   • Smokeless tobacco: Never   Vaping Use   • Vaping Use: Never used   Substance Use Topics   • Alcohol use: Yes     Comment: 3-4 x year   • Drug use: No           Procedures  None     Fortino Vital DO

## 2023-01-07 ENCOUNTER — OFFICE VISIT (OUTPATIENT)
Dept: URGENT CARE | Facility: MEDICAL CENTER | Age: 48
End: 2023-01-07

## 2023-01-07 VITALS
TEMPERATURE: 97.6 F | OXYGEN SATURATION: 98 % | RESPIRATION RATE: 18 BRPM | SYSTOLIC BLOOD PRESSURE: 130 MMHG | WEIGHT: 236 LBS | BODY MASS INDEX: 31.97 KG/M2 | DIASTOLIC BLOOD PRESSURE: 83 MMHG | HEIGHT: 72 IN | HEART RATE: 85 BPM

## 2023-01-07 DIAGNOSIS — J02.9 ACUTE PHARYNGITIS, UNSPECIFIED ETIOLOGY: Primary | ICD-10-CM

## 2023-01-07 LAB — S PYO AG THROAT QL: NEGATIVE

## 2023-01-07 NOTE — PATIENT INSTRUCTIONS
1  Over-the-counter ibuprofen and/or acetaminophen as needed for pain or fever  2   Use Chloraseptic spray as needed for sore throat pain relief  3  Increase oral fluids  4  Observe a liquid and/or soft diet until symptoms improve  5  Go to the ER immediately for any worsening symptoms, inability to swallow, shortness of breath, or any other ominous symptoms  6   Follow-up with primary care doctor on 1/12/2023 as scheduled

## 2023-01-07 NOTE — PROGRESS NOTES
[facility]        NAME: Sharon López is a 52 y o  male  : 1975    MRN: [id number]  DATE: 2023  TIME: 1:31 PM    Assessment and Plan   Acute pharyngitis, unspecified etiology [J02 9]  1  Acute pharyngitis, unspecified etiology  POCT rapid strepA    Throat culture            Patient Instructions     1  Over-the-counter ibuprofen and/or acetaminophen as needed for pain or fever  2   Use Chloraseptic spray as needed for sore throat pain relief  3  Increase oral fluids  4  Observe a liquid and/or soft diet until symptoms improve  5  Go to the ER immediately for any worsening symptoms, inability to swallow, shortness of breath, or any other ominous symptoms  6   Follow-up with primary care doctor on 2023 as scheduled  Chief Complaint     Chief Complaint   Patient presents with   • Sore Throat     Pt having dry, sore throat x6 days         History of Present Illness       12-year-old male patient with a 1 week history of dry throat, scratchy throat which stimulates a dry cough  He denies any difficulty swallowing, runny nose, nasal congestion, fever, chills  No difficulty swallowing  No shortness of breath  No GI symptoms  Patient states the symptoms are slightly improving with the use of over-the-counter medications but he wants to be checked  Review of Systems   Review of Systems   Constitutional: Negative for chills and fever  HENT: Positive for sore throat  Negative for ear pain  Eyes: Negative for pain and visual disturbance  Respiratory: Positive for cough  Negative for shortness of breath  Cardiovascular: Negative for chest pain and palpitations  Gastrointestinal: Negative for abdominal pain and vomiting  Genitourinary: Negative for dysuria and hematuria  Musculoskeletal: Negative for arthralgias and back pain  Skin: Negative for color change and rash  Neurological: Negative for seizures and syncope     All other systems reviewed and are negative  Current Medications       Current Outpatient Medications:   •  omeprazole (PriLOSEC) 40 MG capsule, Take 40 mg by mouth daily, Disp: , Rfl:   •  aspirin (ECOTRIN LOW STRENGTH) 81 mg EC tablet, Take 1 tablet (81 mg total) by mouth 2 (two) times a day (Patient not taking: Reported on 1/7/2023), Disp: 28 tablet, Rfl: 0  •  levothyroxine 25 mcg tablet, , Disp: , Rfl:   •  lidocaine (XYLOCAINE) 5 % ointment, Apply topically as needed for mild pain (Patient not taking: Reported on 1/7/2023), Disp: 35 44 g, Rfl: 2  •  ondansetron (Zofran ODT) 4 mg disintegrating tablet, Take 1 tablet (4 mg total) by mouth every 6 (six) hours as needed for nausea or vomiting (Patient not taking: Reported on 1/7/2023), Disp: 20 tablet, Rfl: 0  •  oxyCODONE-acetaminophen (Percocet) 5-325 mg per tablet, Take 1 tablet by mouth every 4 (four) hours as needed for moderate pain Max Daily Amount: 6 tablets (Patient not taking: Reported on 1/7/2023), Disp: 10 tablet, Rfl: 0    Current Allergies     Allergies as of 01/07/2023   • (No Known Allergies)            The following portions of the patient's history were reviewed and updated as appropriate: allergies, current medications, past family history, past medical history, past social history, past surgical history and problem list      Past Medical History:   Diagnosis Date   • Disease of thyroid gland    • GERD (gastroesophageal reflux disease)    • Hypertension    • Rotator cuff tear        Past Surgical History:   Procedure Laterality Date   • KNEE SURGERY Bilateral     torn ligament    • ND ARTHRS KNE SURG W/MENISCECTOMY MED/LAT W/SHVG Right 4/8/2022    Procedure: ARTHROSCOPY KNEE- Right Knee partial medial menisectomy;  Surgeon: Millie Kirk DO;  Location: [facility];  Service: Orthopedics   • VASECTOMY         Family History   Problem Relation Age of Onset   • No Known Problems Mother    • SELINA disease Father          Medications have been verified          Objective   BP 130/83 (BP Location: Left arm)   Pulse 85   Temp 97 6 °F (36 4 °C) (Temporal)   Resp 18   Ht 6' (1 829 m)   Wt 107 kg (236 lb)   SpO2 98%   BMI 32 01 kg/m²        Physical Exam     Physical Exam  Vitals and nursing note reviewed  Constitutional:       General: He is not in acute distress  Appearance: Normal appearance  He is not ill-appearing  HENT:      Head: Normocephalic  Right Ear: Tympanic membrane normal       Left Ear: Tympanic membrane normal       Nose: Nose normal  No congestion or rhinorrhea  Mouth/Throat:      Mouth: Mucous membranes are moist       Pharynx: Posterior oropharyngeal erythema present  No pharyngeal swelling or oropharyngeal exudate  Eyes:      Conjunctiva/sclera: Conjunctivae normal       Pupils: Pupils are equal, round, and reactive to light  Cardiovascular:      Rate and Rhythm: Normal rate and regular rhythm  Pulses: Normal pulses  Heart sounds: Normal heart sounds  Pulmonary:      Effort: Pulmonary effort is normal       Breath sounds: Normal breath sounds  Musculoskeletal:         General: Normal range of motion  Cervical back: Normal range of motion and neck supple  Lymphadenopathy:      Cervical: No cervical adenopathy  Skin:     General: Skin is warm and dry  Capillary Refill: Capillary refill takes less than 2 seconds  Neurological:      Mental Status: He is alert and oriented to person, place, and time     Psychiatric:         Mood and Affect: Mood normal          Behavior: Behavior normal

## 2023-01-10 LAB — BACTERIA THROAT CULT: NORMAL

## 2023-01-13 ENCOUNTER — HOSPITAL ENCOUNTER (OUTPATIENT)
Dept: MRI IMAGING | Facility: HOSPITAL | Age: 48
Discharge: HOME | End: 2023-01-13

## 2023-01-13 DIAGNOSIS — M23.92 INTERNAL DERANGEMENT OF LEFT KNEE: ICD-10-CM

## 2023-02-20 ENCOUNTER — OFFICE VISIT (OUTPATIENT)
Dept: OBGYN CLINIC | Facility: CLINIC | Age: 48
End: 2023-02-20

## 2023-02-20 VITALS
WEIGHT: 236 LBS | SYSTOLIC BLOOD PRESSURE: 131 MMHG | HEART RATE: 111 BPM | DIASTOLIC BLOOD PRESSURE: 85 MMHG | BODY MASS INDEX: 31.97 KG/M2 | HEIGHT: 72 IN

## 2023-02-20 DIAGNOSIS — S83.242D OTHER TEAR OF MEDIAL MENISCUS OF LEFT KNEE AS CURRENT INJURY, SUBSEQUENT ENCOUNTER: Primary | ICD-10-CM

## 2023-02-20 DIAGNOSIS — G89.29 CHRONIC PAIN OF LEFT KNEE: ICD-10-CM

## 2023-02-20 DIAGNOSIS — M25.562 CHRONIC PAIN OF LEFT KNEE: ICD-10-CM

## 2023-02-20 DIAGNOSIS — M25.462 EFFUSION OF LEFT KNEE: ICD-10-CM

## 2023-02-20 RX ORDER — CHLORHEXIDINE GLUCONATE 4 G/100ML
SOLUTION TOPICAL DAILY PRN
OUTPATIENT
Start: 2023-02-20

## 2023-02-20 RX ORDER — PHENTERMINE HYDROCHLORIDE 15 MG/1
15 CAPSULE ORAL EVERY MORNING
COMMUNITY
Start: 2023-02-17 | End: 2023-03-19

## 2023-02-20 NOTE — H&P (VIEW-ONLY)
Patient Name:  Sana Mendiola  MRN:  [id number]    [address] [place] [address]     1  Other tear of medial meniscus of left knee as current injury, subsequent encounter  -     Case request operating room: ARTHROSCOPY KNEE- Left Knee partial medial menisectomy, all associated procedures; Standing  -     Comprehensive metabolic panel; Future  -     EKG 12 lead; Future  -     XR chest pa & lateral; Future; Expected date: 02/20/2023  -     Case request operating room: ARTHROSCOPY KNEE- Left Knee partial medial menisectomy, all associated procedures    2  Effusion of left knee  -     Case request operating room: ARTHROSCOPY KNEE- Left Knee partial medial menisectomy, all associated procedures; Standing  -     Case request operating room: ARTHROSCOPY KNEE- Left Knee partial medial menisectomy, all associated procedures    3  Chronic pain of left knee  -     Case request operating room: ARTHROSCOPY KNEE- Left Knee partial medial menisectomy, all associated procedures; Standing  -     Case request operating room: ARTHROSCOPY KNEE- Left Knee partial medial menisectomy, all associated procedures      52 y o  male with Left knee medial meniscus tear  MRI reviewed in office today and discussed nonoperative vs surgical management of Left knee medial meniscus tear  Non operative management would include formal physical therapy and physician directed home exercise program, activity modification, oral analgesics, and possible injection therapy  Patient continues to complain of locking symptoms, pain and swelling and would like to move forward with surgery by means of Left knee arthroscopic partial medial menisectomy, all associated procedures  Risks of surgery, including but not limited to, anesthesia complications, infection, damage to nerves and blood vessels, blood clots, postoperative stiffness, recurrent meniscal tearing, posttraumatic arthritis, residual pain, need for subsequent surgery were discussed at length    Benefits of surgery include improved pain, decrease risk of tear propagation, and improved mechanical symptoms  The patient understands the risks and benefits and has no further questions  The patient has elected to proceed forward with Left knee arthroscopy with partial medial meniscectomy, and will go to OR with Dr Kirk Busch 03/10/2023 or 03/15/2023  He will call the office tomorrow to discuss surgical date  History of the Present Illness   Carol Simms is a 52 y o  male with Left knee internal derangement  Today, patient states he is having a better day, but is concerned because of his locking symptoms  He admits to occasional swelling with associated pain  He is scared to do many activities for fear of mechanical symptoms  Review of Systems     Review of Systems   Constitutional: Negative for chills and fever  HENT: Negative for ear pain and sore throat  Eyes: Negative for pain and visual disturbance  Respiratory: Negative for cough and shortness of breath  Cardiovascular: Negative for chest pain and palpitations  Gastrointestinal: Negative for abdominal pain and vomiting  Genitourinary: Negative for dysuria and hematuria  Musculoskeletal: Negative for arthralgias and back pain  Skin: Negative for color change and rash  Neurological: Negative for seizures and syncope  All other systems reviewed and are negative  Physical Exam     /85   Pulse (!) 111   Ht 6' (1 829 m)   Wt 107 kg (236 lb)   BMI 32 01 kg/m²     Left  Knee  Range of motion from 0 to 130 degrees with mild crepitus  There is trace effusion  There is minimal tenderness over the medial joint line  The patient is able to perform a straight leg raise  Varus stress testing reveals no instability at 0 and 30 degrees   Valgus stress testing reveals no instability at 0 and 30 degrees  Positive Amelia test  The patient is neurovascular intact distally        Data Review     I have personally reviewed pertinent films in PACS, and my interpretation follows  MRI of Left knee performed 2023 demonstrates horizontal tear of medial meniscus and mild degenerative changes at medial and patellofemoral compartments  X-rays taken 2021 of Left  knee demonstrates well maintained joint spaces without evidence of acute fracture or dislocation  No new images at today's appointment      Social History     Tobacco Use   • Smoking status: Former     Packs/day: 1 00     Years: 26 00     Pack years: 26 00     Types: Cigarettes     Quit date:      Years since quittin 1   • Smokeless tobacco: Never   Vaping Use   • Vaping Use: Never used   Substance Use Topics   • Alcohol use: Yes     Comment: 3-4 x year   • Drug use: No           Procedures  None     Prem Ax, DO

## 2023-02-20 NOTE — PROGRESS NOTES
Patient Name:  Elvia Mascorro  MRN:  [id number]    [address] [place], [place] [address]     1  Other tear of medial meniscus of left knee as current injury, subsequent encounter  -     Case request operating room: ARTHROSCOPY KNEE- Left Knee partial medial menisectomy, all associated procedures; Standing  -     Comprehensive metabolic panel; Future  -     EKG 12 lead; Future  -     XR chest pa & lateral; Future; Expected date: 02/20/2023  -     Case request operating room: ARTHROSCOPY KNEE- Left Knee partial medial menisectomy, all associated procedures    2  Effusion of left knee  -     Case request operating room: ARTHROSCOPY KNEE- Left Knee partial medial menisectomy, all associated procedures; Standing  -     Case request operating room: ARTHROSCOPY KNEE- Left Knee partial medial menisectomy, all associated procedures    3  Chronic pain of left knee  -     Case request operating room: ARTHROSCOPY KNEE- Left Knee partial medial menisectomy, all associated procedures; Standing  -     Case request operating room: ARTHROSCOPY KNEE- Left Knee partial medial menisectomy, all associated procedures      52 y o  male with Left knee medial meniscus tear  MRI reviewed in office today and discussed nonoperative vs surgical management of Left knee medial meniscus tear  Non operative management would include formal physical therapy and physician directed home exercise program, activity modification, oral analgesics, and possible injection therapy  Patient continues to complain of locking symptoms, pain and swelling and would like to move forward with surgery by means of Left knee arthroscopic partial medial menisectomy, all associated procedures  Risks of surgery, including but not limited to, anesthesia complications, infection, damage to nerves and blood vessels, blood clots, postoperative stiffness, recurrent meniscal tearing, posttraumatic arthritis, residual pain, need for subsequent surgery were discussed at length    Benefits of surgery include improved pain, decrease risk of tear propagation, and improved mechanical symptoms  The patient understands the risks and benefits and has no further questions  The patient has elected to proceed forward with Left knee arthroscopy with partial medial meniscectomy, and will go to OR with Dr Yuly Solomon 03/10/2023 or 03/15/2023  He will call the office tomorrow to discuss surgical date  History of the Present Illness   Nikia Pickett is a 52 y o  male with Left knee internal derangement  Today, patient states he is having a better day, but is concerned because of his locking symptoms  He admits to occasional swelling with associated pain  He is scared to do many activities for fear of mechanical symptoms  Review of Systems     Review of Systems   Constitutional: Negative for chills and fever  HENT: Negative for ear pain and sore throat  Eyes: Negative for pain and visual disturbance  Respiratory: Negative for cough and shortness of breath  Cardiovascular: Negative for chest pain and palpitations  Gastrointestinal: Negative for abdominal pain and vomiting  Genitourinary: Negative for dysuria and hematuria  Musculoskeletal: Negative for arthralgias and back pain  Skin: Negative for color change and rash  Neurological: Negative for seizures and syncope  All other systems reviewed and are negative  Physical Exam     /85   Pulse (!) 111   Ht 6' (1 829 m)   Wt 107 kg (236 lb)   BMI 32 01 kg/m²     Left  Knee  Range of motion from 0 to 130 degrees with mild crepitus  There is trace effusion  There is minimal tenderness over the medial joint line  The patient is able to perform a straight leg raise  Varus stress testing reveals no instability at 0 and 30 degrees   Valgus stress testing reveals no instability at 0 and 30 degrees  Positive Amelia test  The patient is neurovascular intact distally        Data Review     I have personally reviewed pertinent films in PACS, and my interpretation follows  MRI of Left knee performed 2023 demonstrates horizontal tear of medial meniscus and mild degenerative changes at medial and patellofemoral compartments  X-rays taken 2021 of Left  knee demonstrates well maintained joint spaces without evidence of acute fracture or dislocation  No new images at today's appointment      Social History     Tobacco Use   • Smoking status: Former     Packs/day: 1 00     Years: 26 00     Pack years: 26 00     Types: Cigarettes     Quit date:      Years since quittin 1   • Smokeless tobacco: Never   Vaping Use   • Vaping Use: Never used   Substance Use Topics   • Alcohol use: Yes     Comment: 3-4 x year   • Drug use: No           Procedures  None     Ct Duong,

## 2023-02-28 NOTE — PRE-PROCEDURE INSTRUCTIONS
Pre-Surgery Instructions:   Medication Instructions   • omeprazole (PriLOSEC) 40 MG capsule Take day of surgery  • phentermine 15 MG capsule Stop taking 5 days prior to surgery  INSTR ON MENDEZ CALL,  REPORT LOC , BRING PHOTO ID/MED LIST/INS  INFO ,SHOWER REV , STOP ASA/NSAID/VIT 7 DAY PREOP, PT VERBALIZES UNDERSTANDING W/ NO FURTHER QUESTIONS

## 2023-03-03 ENCOUNTER — TELEPHONE (OUTPATIENT)
Dept: OBGYN CLINIC | Facility: CLINIC | Age: 48
End: 2023-03-03

## 2023-03-06 ENCOUNTER — OFFICE VISIT (OUTPATIENT)
Dept: LAB | Facility: HOSPITAL | Age: 48
End: 2023-03-06

## 2023-03-06 ENCOUNTER — HOSPITAL ENCOUNTER (OUTPATIENT)
Dept: RADIOLOGY | Facility: HOSPITAL | Age: 48
Discharge: HOME | End: 2023-03-06

## 2023-03-06 ENCOUNTER — APPOINTMENT (OUTPATIENT)
Dept: LAB | Facility: HOSPITAL | Age: 48
End: 2023-03-06

## 2023-03-06 DIAGNOSIS — S83.242D OTHER TEAR OF MEDIAL MENISCUS OF LEFT KNEE AS CURRENT INJURY, SUBSEQUENT ENCOUNTER: ICD-10-CM

## 2023-03-06 LAB
ALBUMIN SERPL BCP-MCNC: 4.2 G/DL (ref 3.5–5)
ALP SERPL-CCNC: 49 U/L (ref 34–104)
ALT SERPL W P-5'-P-CCNC: 39 U/L (ref 7–52)
ANION GAP SERPL CALCULATED.3IONS-SCNC: 5 MMOL/L (ref 4–13)
AST SERPL W P-5'-P-CCNC: 24 U/L (ref 13–39)
ATRIAL RATE: 69 BPM
BILIRUB SERPL-MCNC: 0.73 MG/DL (ref 0.2–1)
BUN SERPL-MCNC: 17 MG/DL (ref 5–25)
CALCIUM SERPL-MCNC: 9 MG/DL (ref 8.4–10.2)
CHLORIDE SERPL-SCNC: 107 MMOL/L (ref 96–108)
CO2 SERPL-SCNC: 27 MMOL/L (ref 21–32)
CREAT SERPL-MCNC: 0.98 MG/DL (ref 0.6–1.3)
GFR SERPL CREATININE-BSD FRML MDRD: 91 ML/MIN/1.73SQ M
GLUCOSE P FAST SERPL-MCNC: 100 MG/DL (ref 65–99)
P AXIS: 29 DEGREES
POTASSIUM SERPL-SCNC: 4.2 MMOL/L (ref 3.5–5.3)
PR INTERVAL: 204 MS
PROT SERPL-MCNC: 7.3 G/DL (ref 6.4–8.4)
QRS AXIS: 38 DEGREES
QRSD INTERVAL: 88 MS
QT INTERVAL: 386 MS
QTC INTERVAL: 413 MS
SODIUM SERPL-SCNC: 139 MMOL/L (ref 135–147)
T WAVE AXIS: 26 DEGREES
VENTRICULAR RATE: 69 BPM

## 2023-03-09 ENCOUNTER — ANESTHESIA EVENT (OUTPATIENT)
Dept: PERIOP | Facility: HOSPITAL | Age: 48
End: 2023-03-09

## 2023-03-10 ENCOUNTER — ANESTHESIA (OUTPATIENT)
Dept: PERIOP | Facility: HOSPITAL | Age: 48
End: 2023-03-10

## 2023-03-10 ENCOUNTER — HOSPITAL ENCOUNTER (OUTPATIENT)
Facility: HOSPITAL | Age: 48
Setting detail: OUTPATIENT SURGERY
Discharge: HOME | End: 2023-03-10
Attending: ORTHOPAEDIC SURGERY | Admitting: ORTHOPAEDIC SURGERY

## 2023-03-10 VITALS
HEART RATE: 80 BPM | DIASTOLIC BLOOD PRESSURE: 84 MMHG | OXYGEN SATURATION: 93 % | TEMPERATURE: 98.4 F | RESPIRATION RATE: 16 BRPM | HEIGHT: 72 IN | SYSTOLIC BLOOD PRESSURE: 140 MMHG | BODY MASS INDEX: 34.28 KG/M2 | WEIGHT: 253.09 LBS

## 2023-03-10 DIAGNOSIS — S83.232D COMPLEX TEAR OF MEDIAL MENISCUS OF LEFT KNEE AS CURRENT INJURY, SUBSEQUENT ENCOUNTER: Primary | ICD-10-CM

## 2023-03-10 PROBLEM — E03.9 HYPOTHYROIDISM: Status: ACTIVE | Noted: 2022-03-31

## 2023-03-10 RX ORDER — KETAMINE HCL IN NACL, ISO-OSM 100MG/10ML
SYRINGE (ML) INJECTION AS NEEDED
Status: DISCONTINUED | OUTPATIENT
Start: 2023-03-10 | End: 2023-03-10

## 2023-03-10 RX ORDER — ONDANSETRON 4 MG/1
4 TABLET, FILM COATED ORAL EVERY 8 HOURS PRN
Qty: 20 TABLET | Refills: 0 | Status: SHIPPED | OUTPATIENT
Start: 2023-03-10

## 2023-03-10 RX ORDER — PROPOFOL 10 MG/ML
INJECTION, EMULSION INTRAVENOUS AS NEEDED
Status: DISCONTINUED | OUTPATIENT
Start: 2023-03-10 | End: 2023-03-10

## 2023-03-10 RX ORDER — LIDOCAINE HYDROCHLORIDE 10 MG/ML
0.5 INJECTION, SOLUTION EPIDURAL; INFILTRATION; INTRACAUDAL; PERINEURAL ONCE AS NEEDED
Status: DISCONTINUED | OUTPATIENT
Start: 2023-03-10 | End: 2023-03-10 | Stop reason: HOSPADM

## 2023-03-10 RX ORDER — IBUPROFEN 800 MG/1
800 TABLET ORAL EVERY 8 HOURS PRN
Qty: 30 TABLET | Refills: 0 | Status: SHIPPED | OUTPATIENT
Start: 2023-03-10

## 2023-03-10 RX ORDER — HYDROMORPHONE HCL/PF 1 MG/ML
0.5 SYRINGE (ML) INJECTION
Status: DISCONTINUED | OUTPATIENT
Start: 2023-03-10 | End: 2023-03-10 | Stop reason: HOSPADM

## 2023-03-10 RX ORDER — ASPIRIN 81 MG/1
81 TABLET ORAL 2 TIMES DAILY
Qty: 28 TABLET | Refills: 0 | Status: SHIPPED | OUTPATIENT
Start: 2023-03-10

## 2023-03-10 RX ORDER — CEFAZOLIN SODIUM 2 G/50ML
2000 SOLUTION INTRAVENOUS ONCE
Status: DISCONTINUED | OUTPATIENT
Start: 2023-03-10 | End: 2023-03-10 | Stop reason: HOSPADM

## 2023-03-10 RX ORDER — ONDANSETRON 2 MG/ML
4 INJECTION INTRAMUSCULAR; INTRAVENOUS EVERY 6 HOURS PRN
Status: DISCONTINUED | OUTPATIENT
Start: 2023-03-10 | End: 2023-03-10 | Stop reason: HOSPADM

## 2023-03-10 RX ORDER — OXYCODONE HYDROCHLORIDE AND ACETAMINOPHEN 5; 325 MG/1; MG/1
1 TABLET ORAL ONCE
Status: COMPLETED | OUTPATIENT
Start: 2023-03-10 | End: 2023-03-10

## 2023-03-10 RX ORDER — FENTANYL CITRATE/PF 50 MCG/ML
50 SYRINGE (ML) INJECTION
Status: COMPLETED | OUTPATIENT
Start: 2023-03-10 | End: 2023-03-10

## 2023-03-10 RX ORDER — ONDANSETRON 2 MG/ML
INJECTION INTRAMUSCULAR; INTRAVENOUS AS NEEDED
Status: DISCONTINUED | OUTPATIENT
Start: 2023-03-10 | End: 2023-03-10

## 2023-03-10 RX ORDER — SODIUM CHLORIDE, SODIUM LACTATE, POTASSIUM CHLORIDE, CALCIUM CHLORIDE 600; 310; 30; 20 MG/100ML; MG/100ML; MG/100ML; MG/100ML
125 INJECTION, SOLUTION INTRAVENOUS CONTINUOUS
Status: DISCONTINUED | OUTPATIENT
Start: 2023-03-10 | End: 2023-03-10 | Stop reason: HOSPADM

## 2023-03-10 RX ORDER — BUPIVACAINE HYDROCHLORIDE 5 MG/ML
INJECTION, SOLUTION EPIDURAL; INTRACAUDAL AS NEEDED
Status: DISCONTINUED | OUTPATIENT
Start: 2023-03-10 | End: 2023-03-10 | Stop reason: HOSPADM

## 2023-03-10 RX ORDER — FENTANYL CITRATE 50 UG/ML
INJECTION, SOLUTION INTRAMUSCULAR; INTRAVENOUS AS NEEDED
Status: DISCONTINUED | OUTPATIENT
Start: 2023-03-10 | End: 2023-03-10

## 2023-03-10 RX ORDER — LIDOCAINE HYDROCHLORIDE 10 MG/ML
INJECTION, SOLUTION EPIDURAL; INFILTRATION; INTRACAUDAL; PERINEURAL AS NEEDED
Status: DISCONTINUED | OUTPATIENT
Start: 2023-03-10 | End: 2023-03-10

## 2023-03-10 RX ORDER — DEXAMETHASONE SODIUM PHOSPHATE 10 MG/ML
INJECTION, SOLUTION INTRAMUSCULAR; INTRAVENOUS AS NEEDED
Status: DISCONTINUED | OUTPATIENT
Start: 2023-03-10 | End: 2023-03-10

## 2023-03-10 RX ORDER — ONDANSETRON 2 MG/ML
4 INJECTION INTRAMUSCULAR; INTRAVENOUS ONCE AS NEEDED
Status: DISCONTINUED | OUTPATIENT
Start: 2023-03-10 | End: 2023-03-10 | Stop reason: HOSPADM

## 2023-03-10 RX ORDER — CEFAZOLIN SODIUM 2 G/50ML
SOLUTION INTRAVENOUS AS NEEDED
Status: DISCONTINUED | OUTPATIENT
Start: 2023-03-10 | End: 2023-03-10

## 2023-03-10 RX ORDER — MIDAZOLAM HYDROCHLORIDE 2 MG/2ML
INJECTION, SOLUTION INTRAMUSCULAR; INTRAVENOUS AS NEEDED
Status: DISCONTINUED | OUTPATIENT
Start: 2023-03-10 | End: 2023-03-10

## 2023-03-10 RX ORDER — OXYCODONE HYDROCHLORIDE AND ACETAMINOPHEN 5; 325 MG/1; MG/1
1 TABLET ORAL EVERY 4 HOURS PRN
Qty: 7 TABLET | Refills: 0 | Status: SHIPPED | OUTPATIENT
Start: 2023-03-10

## 2023-03-10 RX ADMIN — ONDANSETRON 4 MG: 2 INJECTION INTRAMUSCULAR; INTRAVENOUS at 11:30

## 2023-03-10 RX ADMIN — PROPOFOL 200 MG: 10 INJECTION, EMULSION INTRAVENOUS at 10:50

## 2023-03-10 RX ADMIN — Medication 20 MG: at 11:15

## 2023-03-10 RX ADMIN — FENTANYL CITRATE 25 MCG: 50 INJECTION, SOLUTION INTRAMUSCULAR; INTRAVENOUS at 11:13

## 2023-03-10 RX ADMIN — FENTANYL CITRATE 50 MCG: 50 INJECTION INTRAMUSCULAR; INTRAVENOUS at 12:10

## 2023-03-10 RX ADMIN — FENTANYL CITRATE 50 MCG: 50 INJECTION INTRAMUSCULAR; INTRAVENOUS at 11:55

## 2023-03-10 RX ADMIN — FENTANYL CITRATE 50 MCG: 50 INJECTION, SOLUTION INTRAMUSCULAR; INTRAVENOUS at 10:50

## 2023-03-10 RX ADMIN — HYDROMORPHONE HYDROCHLORIDE 0.5 MG: 1 INJECTION, SOLUTION INTRAMUSCULAR; INTRAVENOUS; SUBCUTANEOUS at 12:24

## 2023-03-10 RX ADMIN — OXYCODONE HYDROCHLORIDE AND ACETAMINOPHEN 1 TABLET: 5; 325 TABLET ORAL at 12:59

## 2023-03-10 RX ADMIN — CEFAZOLIN SODIUM 2000 MG: 2 SOLUTION INTRAVENOUS at 10:46

## 2023-03-10 RX ADMIN — MIDAZOLAM HYDROCHLORIDE 2 MG: 1 INJECTION, SOLUTION INTRAMUSCULAR; INTRAVENOUS at 10:44

## 2023-03-10 RX ADMIN — DEXAMETHASONE SODIUM PHOSPHATE 10 MG: 10 INJECTION, SOLUTION INTRAMUSCULAR; INTRAVENOUS at 10:50

## 2023-03-10 RX ADMIN — FENTANYL CITRATE 25 MCG: 50 INJECTION, SOLUTION INTRAMUSCULAR; INTRAVENOUS at 11:05

## 2023-03-10 RX ADMIN — SODIUM CHLORIDE, SODIUM LACTATE, POTASSIUM CHLORIDE, AND CALCIUM CHLORIDE 125 ML/HR: .6; .31; .03; .02 INJECTION, SOLUTION INTRAVENOUS at 10:43

## 2023-03-10 RX ADMIN — LIDOCAINE HYDROCHLORIDE 50 MG: 10 INJECTION, SOLUTION EPIDURAL; INFILTRATION; INTRACAUDAL; PERINEURAL at 10:50

## 2023-03-10 NOTE — INTERVAL H&P NOTE
H&P reviewed  After examining the patient I find no changes in the patients condition since the H&P had been written  Patient was seen and evaluated in the office where continued nonoperative vs surgical management of Left knee medial meniscus tear was discussed  In light of patients persistent pain, swelling, difficulty with ADLs and function, patient would like to move forward with surgical intervention  Risks of surgical intervention discussed in the office with patient and detailed consent obtained  Patient will go to OR on 03/10/2023 with Dr Olivia Dotson for Left knee arthroscopic partial medial menisectomy, all associated procedures       14 point ROS in office   Musculoskeletal Left knee pain      Heart RRR  Lungs CTA BL       Vitals:    03/10/23 1025   BP: 161/82   Pulse: 96   Resp: 20   Temp: 97 9 °F (36 6 °C)   SpO2: 96%

## 2023-03-10 NOTE — ANESTHESIA POSTPROCEDURE EVALUATION
Post-Op Assessment Note    CV Status:  Stable  Pain Score: 0    Pain management: adequate     Mental Status:  Alert and awake   Hydration Status:  Euvolemic   PONV Controlled:  Controlled   Airway Patency:  Patent   Two or more mitigation strategies used for obstructive sleep apnea   Post Op Vitals Reviewed: Yes      Staff: CRNA         No notable events documented      BP   159/79   Temp 98   Pulse 80   Resp 18   SpO2 96

## 2023-03-10 NOTE — ANESTHESIA PREPROCEDURE EVALUATION
Procedure:  ARTHROSCOPY KNEE- Left Knee partial medial menisectomy, all associated procedures (Left: Knee)    Relevant Problems   ANESTHESIA (within normal limits)   (-) History of anesthesia complications      CARDIO   (+) Hypertension   (-) Chest pain   (-) SUN (dyspnea on exertion)      ENDO   (+) Hypothyroidism      GI/HEPATIC   (+) GERD (gastroesophageal reflux disease) (well controlled )      PULMONARY   (+) ELIAS (obstructive sleep apnea) (denies, not using CPAP)   (-) Shortness of breath   (-) URI (upper respiratory infection)      Other   (+) BMI 33 0-33 9,adult        Physical Exam    Airway    Mallampati score: II  TM Distance: >3 FB  Neck ROM: full     Dental   upper dentures,     Cardiovascular      Pulmonary      Other Findings        Anesthesia Plan  ASA Score- 2     Anesthesia Type- general with ASA Monitors  Additional Monitors:   Airway Plan: LMA  Plan Factors-Exercise tolerance (METS): >4 METS  Chart reviewed  EKG reviewed  Existing labs reviewed  Patient summary reviewed  Induction- intravenous  Postoperative Plan-     Informed Consent- Anesthetic plan and risks discussed with patient  I personally reviewed this patient with the CRNA  Discussed and agreed on the Anesthesia Plan with the CRNA  Jaye Augustine

## 2023-03-10 NOTE — OP NOTE
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    Procedure(s):  Left - ARTHROSCOPY KNEE- Left Knee partial medial menisectomy  Chondroplasty  medial femoral condyle    Specimen(s):  * No specimens in log *    Estimated Blood Loss:   Minimal    Drains:  * No LDAs found *    Anesthesia Type:   General with 20 cc 0 5% Marcaine local anesthetic    Operative Indications: Other tear of medial meniscus of left knee as current injury, subsequent encounter [S83 242D]  Persistent pain and mechanical symptoms affecting activities of daily living despite nonoperative treatment including oral analgesics, activity modification, and injection therapy  Operative Findings:  Complex tear posterior horn medial meniscus of left knee, grade II chondromalacia patella and medial femoral condyle    Complications:   None    Procedure and Technique:    Antibiotics: 2 g Ancef  Implants: None    The patient was seen in the preoperative holding area and the appropriate site of surgery was confirmed and marked  Upon bringing the patient back to the operating room he was placed supine on the operating room table  A tourniquet was placed on the thigh of the operative leg and the leg was placed in the appropriate leg guajardo  The well leg was placed on a well-padded pillow  Esmarch was used to exsanguinate the leg and tourniquet was inflated to 250 mmHg  The left lower extremity was prepped and draped in the usual sterile fashion   An appropriate preoperative time-out was performed to once again confirm the site of surgery and procedure  A lateral incision was made for an arthroscopic viewing portal and the arthroscopic camera was inserted  A diagnostic arthroscopy was performed and displayed grade II chondromalacia central patella with frayed edges of articular cartilage and no discrete unstable flaps  Camera was moved to the medial compartment  A medial arthroscopic portal was made under direct visualization with the aid of an 18 gauge spinal needle  An arthroscopic probe was inserted and the remainder of the diagnostic arthroscopy was performed which further showed complex tear of the posterior horn medial meniscus grade II chondromalacia of the lateral aspect of the weightbearing portion medial femoral condyle with unstable flap of articular cartilage  Upon moving the camera to the intercondylar notch anterior to ligament was noted to be intact  Upon moving to the lateral compartment, articular surfaces and meniscus was found to be intact  Attention was turned back to the medial compartment  Meniscal tear was further probed with centrally located flap of meniscal tissue in the posterior horn with horizontal cleavage component extending from the posterior horn to posterior body  An arthroscopic shaver was inserted to debride loose ends of the medial meniscus tear  Arthroscopic biter was also used to further remove the torn edge to a nice smooth contour and remove the undersurface flap of the horizontal cleavage component  This was further debrided with an arthroscopic shaver once again  Arthroscopic shaver was then used to debride stable flap of articular cartilage tissue from the medial femoral condyle  Camera was then placed in the medial portal and arthroscopic shaver was used from the lateral portal to further debride the undersurface of the tear extending more anteriorly    The arthroscopic probe was once again inserted to ensure stable edges of the meniscus status post meniscectomy  Arthroscopic incisions were closed with 3-0 nylon suture  20 cc of 0 5% Marcaine local anesthetic were used at the surgical site  A sterile dressing was applied  The patient tolerated the procedure well no complications were noted  The patient was transferred to the PACU without any issue  I was present for the entire procedure, A qualified resident physician was not available and A physician assistant was required during the procedure for tissue handling, assistance with arthroscopic camera and equipment due to the minimally invasive nature of the surgical case and suturing      Patient Disposition:  PACU         SIGNATURE: Harish Gillespie DO  DATE: March 10, 2023  TIME: 11:50 AM

## 2023-03-10 NOTE — DISCHARGE INSTR - AVS FIRST PAGE
Knee Surgery: Postoperative Instructions  Dr Nafisa East may resume your regular diet as soon as possible  Medication    Take the pain medication as prescribed   Take pain medication with food   While taking pain medications, you may NOT operate a vehicle, heavy machinery, or appliances   While taking pain medication, you may NOT drink alcoholic beverages   If you have any reactions to your medications, stop taking them and call my office   If you are not allergic, take one aspirin 81mg twice a day to help prevent blood clots   Please keep in mind that constipation is a very common side effect of taking narcotic pain medication  Take precautions to prevent constipation:  o Drink plenty of water (6-8 glasses of 8 oz  a day)  o Avoid alcohol, caffeine, and dairy products  o Eat plenty of fiber (fruits, vegetables, and whole grains)  o Take an over the counter stool softener (Colace or Dulcolax)  Activity    RANGE OF MOTION   _____ You may bend your knee as much as the dressings will allow     WEIGHT BEARING   _____ You may weight bear as tolerated  _   You may practice quadriceps muscle tightening and straight leg raises several times every hour  Please continue to move your ankle up and down and tighten and relax your calf muscles several times every hour to help reduce swelling and prevent blood clots  You may use your crutches for balance as needed until your first post operative visit  The optimal position of the leg after surgery is for you to be lying flat with your ankle higher than your knee and higher than your heart, in an effort to reduce swelling  It is important to continuously elevate your knee above your heart until your swelling is completely down  Please keep ice applied to the knee for at least the first 72 hours or as long as pain or swelling persists  Do not apply ice directly to skin, or allow water to leak on your dressing    Showering    You may shower 4 days after surgery unless told otherwise  DO NOT immerse the knee under water and DO NOT rub the incision  Pad the incisions dry and place new Band-Aids over the sutures after showering  If you have a brace, you may remove it to shower  Keep your knee straight in the shower  You may sponge bathe for the first 3 days, taking care to keep the dressing clean and dry  Dressing Care    Keep the dressing clean and dry  It is normal to get some bloody wound seepage through the bandage  DO NOT BE ALARMED  If the dressing gets soaked with wound seepage, please reinforce with a dry sterile dressing  Loosen the ace wrap around your knee if it becomes too tight or painful  Remove all dressings 3  days after surgery  If there is still some wound seepage, apply a fresh STERILE gauze over the incisions and secure with tape or an ace wrap  DO NOT TOUCH OR REMOVE THE SUTURES!! Emergency/Follow-Up   Please notify my office at [phone] if you develop any fever (101 deg or above), unexpected warmth, redness or swelling  Please call if your toes become cold, purple, numb, or there is excessive bleeding  Please call the office within 24 hours at [phone] to schedule a follow up appt within 7-10 days from surgery

## 2023-03-20 ENCOUNTER — EVALUATION (OUTPATIENT)
Dept: PHYSICAL THERAPY | Age: 48
End: 2023-03-20

## 2023-03-20 ENCOUNTER — OFFICE VISIT (OUTPATIENT)
Dept: OBGYN CLINIC | Facility: CLINIC | Age: 48
End: 2023-03-20

## 2023-03-20 VITALS
DIASTOLIC BLOOD PRESSURE: 82 MMHG | SYSTOLIC BLOOD PRESSURE: 125 MMHG | WEIGHT: 256 LBS | HEART RATE: 99 BPM | HEIGHT: 72 IN | BODY MASS INDEX: 34.67 KG/M2

## 2023-03-20 DIAGNOSIS — Z48.89 AFTERCARE FOLLOWING SURGERY: Primary | ICD-10-CM

## 2023-03-20 DIAGNOSIS — Z98.890 S/P LEFT KNEE ARTHROSCOPY: ICD-10-CM

## 2023-03-20 DIAGNOSIS — Z98.890 S/P MEDIAL MENISCECTOMY OF LEFT KNEE: Primary | ICD-10-CM

## 2023-03-20 NOTE — PROGRESS NOTES
PT Evaluation     Today's date: 3/20/2023  Patient name: Radha Estrada  : 1975  MRN: [id number]  Referring provider: Yfn Albright DO  Dx:   Encounter Diagnosis     ICD-10-CM    1  S/P medial meniscectomy of left knee  [id number] PT plan of care cert/re-cert          Start Time: 730  Stop Time: 08  Total time in clinic (min): 60 minutes    Assessment  Assessment details: Radha Estrada is a 52 y o  male who presents with pain, decreased strength, decreased ROM, joint effusion, ambulatory dysfunction and balance dysfunction  Due to these impairments, Patient has difficulty performing a/iadls and work-related activities  Patient's clinical presentation is consistent with their referring diagnosis of left knee pain/s/p left A/S  Patient would benefit from skilled physical therapy to address their aforementioned impairments, improve their level of function and to improve their overall quality of life  Impairments: abnormal gait, abnormal muscle firing, abnormal muscle tone, abnormal or restricted ROM, abnormal movement, activity intolerance, impaired balance, impaired physical strength, lacks appropriate home exercise program, pain with function, weight-bearing intolerance, poor posture  and poor body mechanics  Understanding of Dx/Px/POC: good   Prognosis: good    Goals  ST-3 WEEKS  1  Decrease pain by 2 points on VAS at its worst   2   Increase ROM by > 5 deg in all deficients planes  3   Increase LE by 1/2 MMT grade in all deficient planes  LT-6 WEEKS  1  Patient to be independent with a/iadls especially walking  2  Increase functional activities for leisure and home activities to previous LOF    3  Independent with HEP and/or fitness program     Plan  Patient would benefit from: skilled physical therapy  Planned modality interventions: cryotherapy, electrical stimulation/Russian stimulation, thermotherapy: hydrocollator packs and unattended electrical stimulation  Planned therapy interventions: activity modification, behavior modification, body mechanics training, aquatic therapy, flexibility, functional ROM exercises, home exercise program, IADL retraining, joint mobilization, manual therapy, neuromuscular re-education, patient education, postural training, strengthening, stretching, therapeutic activities and therapeutic exercise  Frequency: 2x week (2-3x week)  Duration in weeks: 12  Plan of Care beginning date: 3/20/2023  Plan of Care expiration date: 2023  Treatment plan discussed with: patient        Subjective Evaluation    History of Present Illness  Date of onset: 2022  Date of surgery: 3/10/2023  Mechanism of injury: S/p left PMM, patient complains of stiffness and soreness to left knee, MD recheck is today for stitch removal           Not a recurrent problem   Quality of life: good    Pain  Current pain ratin  At best pain ratin  At worst pain ratin  Quality: tight and discomfort  Relieving factors: ice  Aggravating factors: walking and stair climbing  Progression: improved    Social Support  Steps to enter house: yes  Stairs in house: yes   Lives in: [facility]  Lives with: young children      Diagnostic Tests  X-ray: abnormal  MRI studies: abnormal  Treatments  Previous treatment: physical therapy  Patient Goals  Patient goals for therapy: decreased pain, increased motion, increased strength and independence with ADLs/IADLs          Objective     Static Posture     Knee   Genu varus  Tenderness   Left Knee   Tenderness in the lateral patella and medial joint line       Active Range of Motion   Left Knee   Flexion: 112 degrees   Extension: -3 degrees     Right Knee   Flexion: 128 degrees   Extension: 0 degrees     Strength/Myotome Testing     Left Knee   Flexion: 4  Extension: 4-  Quadriceps contraction: fair    Swelling     Left Knee Girth Measurement (cm)   Joint line: 44 cm    Right Knee Girth Measurement (cm)   Joint line: 42 cm    Ambulation Observational Gait   Gait: antalgic   Decreased walking speed         Flowsheet Rows    Flowsheet Row Most Recent Value   PT/OT G-Codes    Current Score 65   Projected Score 82             Precautions: standard      Manuals 3/20                         MT left knee 10 min                                      Neuro Re-Ed                                                                                                        Ther Ex             Nu step 5 min            slant 30 sec/4x            Hip abd 40/30            Hip add 40/30            Ball heel slides 30x            SLR 2x10            SAQ 3/30            LAQ 3/30            Ther Activity             Leg press  nv           Calf press   nv          Gait Training                                       Modalities             ice 10 min

## 2023-03-20 NOTE — PROGRESS NOTES
Patient Name:  Marianne Swartz  MRN:  [id number]    [address] [place] [address]  Aftercare following surgery    2  S/P left knee arthroscopy      52 y o  male approximately 10 day s/p Left knee arthroscopic partial medial menisectomy, chondroplasty medial femoral condyle performed on 03/10/2023  Suture removed and reviewed intraoperative arthroscopic images with patient  Overall, patient doing very well s/p surgical intervention  At this time, patient may continue with outpatient PT to work on range of motion and strength and transition to home exercises  No soaking the Left knee at this time  Patient agreeable and will follow up in office in 4 weeks for reevaluation of Left knee  History of the Present Illness   Marianne Swartz is a 52 y o  male approximately 10 day s/p Left knee arthroscopic partial medial menisectomy, chondroplasty medial femoral condyle performed on 03/10/2023  Today, patient reports moderate improvement of knee pain since surgery  He admits he has been attending outpatient PT without complications  Patient states he was dancing for about 4 hours the other day without problems or pain  Review of Systems     Review of Systems   Constitutional: Negative for chills and fever  HENT: Negative for ear pain and sore throat  Eyes: Negative for pain and visual disturbance  Respiratory: Negative for cough and shortness of breath  Cardiovascular: Negative for chest pain and palpitations  Gastrointestinal: Negative for abdominal pain and vomiting  Genitourinary: Negative for dysuria and hematuria  Musculoskeletal: Negative for arthralgias and back pain  Skin: Negative for color change and rash  Neurological: Negative for seizures and syncope  All other systems reviewed and are negative        Physical Exam     /82   Pulse 99   Ht 6' (1 829 m)   Wt 116 kg (256 lb)   BMI 34 72 kg/m²     Left  Knee  Surgical incisions well healing, without signs of infection  Range of motion from 0 to 120 degrees without pain  There is mild post operative effusion  There is residual post operative tenderness over the medial joint line  The patient is able to perform a straight leg raise with 5/5 quad strength  Varus stress testing reveals no pain at 0 and 30 degrees   Valgus stress testing reveals no pain at 0 and 30 degrees  Calf soft, compressible and nontender  The patient is neurovascular intact distally  Data Review     I have personally reviewed pertinent films in PACS, and my interpretation follows      No new images    Social History     Tobacco Use   • Smoking status: Former     Packs/day:      Years:      Pack years:      Types: Cigarettes     Quit date:      Years since quittin 2   • Smokeless tobacco: Never   Vaping Use   • Vaping Use: Never used   Substance Use Topics   • Alcohol use: Yes     Comment: 3-4 x year   • Drug use: No           Procedures  None     Tita Pereira PA-C

## 2023-03-22 ENCOUNTER — OFFICE VISIT (OUTPATIENT)
Dept: PHYSICAL THERAPY | Age: 48
End: 2023-03-22

## 2023-03-22 DIAGNOSIS — Z98.890 S/P MEDIAL MENISCECTOMY OF LEFT KNEE: Primary | ICD-10-CM

## 2023-03-22 NOTE — PROGRESS NOTES
Daily Note     Today's date: 3/22/2023  Patient name: Rodolfo Elizabeth  : 1975  MRN: [id number]  Referring provider: Randy Brown DO  Dx:   Encounter Diagnosis     ICD-10-CM    1  S/P medial meniscectomy of left knee  Z98 890                      Subjective: Stiffness noted when walking, some discomfort noted with downward pressure  Objective: See treatment diary below      Assessment: Tolerated treatment well added leg press with good toleracne   PROM progressing nicely at this time  Patient would benefit from continued PT  Plan: Cont per plan of care        Precautions: standard      Manuals 3/20 3/22                        MT left knee 10 min 10 min                                      Neuro Re-Ed                                                                                                        Ther Ex             Nu step 5 min 10 min            slant 30 sec/4x 30 sec/4x           Hip abd 40/30 40#/30           Hip add 40/30 40#/30           Ball heel slides 30x 30x           SLR 2x10 3x10            3#/30            3#/30           Ther Activity             Leg press  75# 30x           Calf press   nv          Gait Training                                       Modalities             ice 10 min Def

## 2023-03-24 ENCOUNTER — OFFICE VISIT (OUTPATIENT)
Dept: PHYSICAL THERAPY | Age: 48
End: 2023-03-24

## 2023-03-24 DIAGNOSIS — Z98.890 S/P MEDIAL MENISCECTOMY OF LEFT KNEE: Primary | ICD-10-CM

## 2023-03-24 NOTE — PROGRESS NOTES
Daily Note     Today's date: 3/24/2023  Patient name: Елена Elizondo  : 1975  MRN: [id number]  Referring provider: Judy Brown DO  Dx:   Encounter Diagnosis     ICD-10-CM    1  S/P medial meniscectomy of left knee  Z98 890                      Subjective: Overall stiffness this am upon waking  Objective: See treatment diary below      Assessment: Good tolerance to strength progression this visit  Vc provided for posterior chain activation with step ups  Adequate hip hinging with quarter squats  No c/o post session, would benefit from continued PT  Plan: Cont per plan of care        Precautions: standard      Manuals 3/20 3/22 3/24                       MT left knee 10 min 10 min  10 min                                    Neuro Re-Ed                                                                                                        Ther Ex             Nu step 5 min 10 min  10 min           slant 30 sec/4x 30 sec/4x 30 sec x4          Hip abd 40/30 40#/30 45#/30          Hip add 40/30 40#/30 45#/30          Ball heel slides 30x 30x 30x          SLR 2x10 3x10 2# 2x10          SAQ 3/30 3#/30 3#/30          LAQ 3/30 3#/30 3#/30          Fwd step ups   30x          Lat step ups   30x          Ther Activity             Leg press  75# 30x 75# 30x          Calf press   55# 30x          Gait Training                                       Modalities             ice 10 min Def def

## 2023-03-25 ENCOUNTER — HOSPITAL ENCOUNTER (EMERGENCY)
Facility: HOSPITAL | Age: 48
Discharge: HOME | End: 2023-03-25
Attending: EMERGENCY MEDICINE

## 2023-03-25 VITALS
SYSTOLIC BLOOD PRESSURE: 125 MMHG | TEMPERATURE: 97.6 F | OXYGEN SATURATION: 98 % | RESPIRATION RATE: 18 BRPM | DIASTOLIC BLOOD PRESSURE: 89 MMHG | HEART RATE: 90 BPM

## 2023-03-25 DIAGNOSIS — M25.562 LEFT KNEE PAIN: Primary | ICD-10-CM

## 2023-03-25 DIAGNOSIS — M25.462 SWELLING OF LEFT KNEE JOINT: ICD-10-CM

## 2023-03-25 RX ORDER — IBUPROFEN 400 MG/1
400 TABLET ORAL ONCE
Status: COMPLETED | OUTPATIENT
Start: 2023-03-25 | End: 2023-03-25

## 2023-03-25 RX ORDER — IBUPROFEN 400 MG/1
400 TABLET ORAL EVERY 6 HOURS PRN
Qty: 30 TABLET | Refills: 0 | Status: SHIPPED | OUTPATIENT
Start: 2023-03-25

## 2023-03-25 RX ADMIN — IBUPROFEN 400 MG: 400 TABLET ORAL at 20:02

## 2023-03-27 ENCOUNTER — OFFICE VISIT (OUTPATIENT)
Dept: PHYSICAL THERAPY | Age: 48
End: 2023-03-27

## 2023-03-27 DIAGNOSIS — Z98.890 S/P MEDIAL MENISCECTOMY OF LEFT KNEE: Primary | ICD-10-CM

## 2023-03-27 NOTE — PROGRESS NOTES
"Daily Note     Today's date: 3/27/2023  Patient name: Claribel Corrales  : 1975  MRN: [id number]  Referring provider: Ashtyn Villanueva DO  Dx:   Encounter Diagnosis     ICD-10-CM    1  S/P medial meniscectomy of left knee  Z98 890           Start Time: 1800  Stop Time: 1845  Total time in clinic (min): 45 minutes    Subjective: Reports no new concerns, doing better overall      Objective: See treatment diary below      Assessment: Tolerated treatment well  No pain post session  Relief post manual stretching  Good recall of exercises, cues for proper dosage  Patient would benefit from continued PT  Plan: Continue per plan of care        Precautions: standard      Manuals 3/20 3/22 3/24 3/27                      MT left knee 10 min 10 min  10 min 10'                                    Neuro Re-Ed                                                                                                        Ther Ex             Nu step 5 min 10 min  10 min  10'          slant 30 sec/4x 30 sec/4x 30 sec x4 30''x4          Hip abd 40/30 40#/30 45#/30 45# 30x         Hip add 40/30 40#/30 45#/30 45# 30x          Ball heel slides 30x 30x 30x 30x          SLR 2x10 3x10 2# 2x10 3# 3x10          SAQ 3/30 3#/30 3#/30 3# 30x         LAQ 3/30 3#/30 3#/30 3# 30x         Fwd step ups   30x 6\" 30x         Lat step ups   30x 6\" 30x          Leg press  75# 30x 75# 30x 75# 30x          Calf press   55# 30x 55# 30x                                                Ther Activity                          Gait Training                                       Modalities             ice 10 min Def def                            "

## 2023-03-27 NOTE — ED PROVIDER NOTES
History  Chief Complaint   Patient presents with   • Knee Swelling     Pt arrives ambulatory with a c/o left knee swelling  Pt reports having knee sx here approx 2 weeks ago for arthritis of the same knee and is concerned about the swelling  Denies any pain of fevers  26-year-old male with history of left knee arthroscopy done by Dr Sriram Montelongo a few weeks ago is coming in with left knee swelling that increased in the past day  Does not have any significant associated pain  No associated fever, no associated redness  No decreased range of motion  He has no associated calf pain or swelling  He has been doing physical therapy twice a week successfully  Actually had a recent follow-up just days ago with orthopedic surgeon that went well  Has been working well with physical therapy  Has not really needed or taken any pain medicine for the knee  He just felt that the knee looked a little more swollen today  He had no associated trauma that he can think of   Just came in for evaluation since he was not sure if he should be concerned about infection or blood clot  History provided by:  Patient  Knee Pain  Location:  Knee  Time since incident:  1 day  Injury: no    Knee location:  L knee  Pain details:     Quality:  Aching    Radiates to:  Does not radiate    Severity:  No pain    Onset quality:  Gradual    Duration:  1 day    Timing:  Constant  Chronicity:  New  Prior injury to area:  Yes  Relieved by:  None tried  Worsened by:  Nothing  Ineffective treatments:  None tried  Associated symptoms: swelling    Associated symptoms: no decreased ROM, no fever, no neck pain, no numbness, no stiffness and no tingling        Prior to Admission Medications   Prescriptions Last Dose Informant Patient Reported?  Taking?   aspirin (ECOTRIN LOW STRENGTH) 81 mg EC tablet   No No   Sig: Take 1 tablet (81 mg total) by mouth 2 (two) times a day   ibuprofen (MOTRIN) 800 mg tablet   No No   Sig: Take 1 tablet (800 mg total) by mouth every 8 (eight) hours as needed for mild pain Take 1 tablet by mouth 3 times daily for 3 days, then take as needed for pain relief  Take with food and water  Discontinue if stomach upset occurs     levothyroxine 25 mcg tablet   Yes No   Patient not taking: Reported on 3/20/2023   lidocaine (XYLOCAINE) 5 % ointment   No No   Sig: Apply topically as needed for mild pain   Patient not taking: Reported on 3/20/2023   omeprazole (PriLOSEC) 40 MG capsule   Yes No   Sig: Take 40 mg by mouth daily   ondansetron (ZOFRAN) 4 mg tablet   No No   Sig: Take 1 tablet (4 mg total) by mouth every 8 (eight) hours as needed for nausea or vomiting   ondansetron (Zofran ODT) 4 mg disintegrating tablet   No No   Sig: Take 1 tablet (4 mg total) by mouth every 6 (six) hours as needed for nausea or vomiting   Patient not taking: Reported on 3/20/2023   oxyCODONE-acetaminophen (Percocet) 5-325 mg per tablet   No No   Sig: Take 1 tablet by mouth every 4 (four) hours as needed for moderate pain Max Daily Amount: 6 tablets   Patient not taking: Reported on 3/20/2023   phentermine 15 MG capsule   Yes No   Sig: Take 15 mg by mouth every morning      Facility-Administered Medications: None       Past Medical History:   Diagnosis Date   • Disease of thyroid gland    • GERD (gastroesophageal reflux disease)    • Hypertension    • Rotator cuff tear        Past Surgical History:   Procedure Laterality Date   • KNEE SURGERY Bilateral     torn ligament    • MENISCECTOMY Left 3/10/2023    Procedure: ARTHROSCOPY KNEE- Left Knee partial medial menisectomy, Chondroplasty, medial femoral condyle;  Surgeon: Jeanna Krause DO;  Location: [facility];  Service: Orthopedics   • LA ARTHRS KNE SURG W/MENISCECTOMY MED/LAT W/SHVG Right 4/8/2022    Procedure: ARTHROSCOPY KNEE- Right Knee partial medial menisectomy;  Surgeon: Jeanna Krause DO;  Location: [facility];  Service: Orthopedics   • VASECTOMY         Family History   Problem Relation Age of Onset   • No Known Problems Mother    • SELINA disease Father      I have reviewed and agree with the history as documented  E-Cigarette/Vaping   • E-Cigarette Use Never User      E-Cigarette/Vaping Substances   • Nicotine No    • THC No    • CBD No    • Flavoring No    • Other No    • Unknown No      Social History     Tobacco Use   • Smoking status: Former     Packs/day:  00     Years:      Pack years:      Types: Cigarettes     Quit date:      Years since quittin 2   • Smokeless tobacco: Never   Vaping Use   • Vaping Use: Never used   Substance Use Topics   • Alcohol use: Yes     Comment: 3-4 x year   • Drug use: No       Review of Systems   Constitutional: Negative for fever  Musculoskeletal: Negative for neck pain and stiffness  All other systems reviewed and are negative  Physical Exam  Physical Exam  Vitals reviewed  HENT:      Head: Normocephalic and atraumatic  Eyes:      Extraocular Movements: Extraocular movements intact  Cardiovascular:      Rate and Rhythm: Normal rate  Pulmonary:      Effort: Pulmonary effort is normal  No respiratory distress  Musculoskeletal:         General: No tenderness  Left knee: Effusion present  No deformity, erythema or ecchymosis  Normal range of motion  No tenderness  Right lower leg: No edema  Left lower leg: No edema  Comments: Left and right knee with well-healed surgical scars, left knee with new scars they are C/D/I without redness/erythema   Neurological:      General: No focal deficit present  Mental Status: He is alert                      Vital Signs  ED Triage Vitals   Temperature Pulse Respirations Blood Pressure SpO2   23   97 6 °F (36 4 °C) 90 18 125/89 98 %      Temp Source Heart Rate Source Patient Position - Orthostatic VS BP Location FiO2 (%)   23 --   Tympanic Monitor Sitting Left arm       Pain Score       03/25/23 2002       Med Not Given for Pain - for MAR use only           Vitals:    03/25/23 1858   BP: 125/89   Pulse: 90   Patient Position - Orthostatic VS: Sitting         Visual Acuity      ED Medications  Medications   ibuprofen (MOTRIN) tablet 400 mg (400 mg Oral Given 3/25/23 2002)       Diagnostic Studies  Results Reviewed     None                 No orders to display              Procedures  Procedures         ED Course                               SBIRT 22yo+    Flowsheet Row Most Recent Value   SBIRT (23 yo +)    In order to provide better care to our patients, we are screening all of our patients for alcohol and drug use  Would it be okay to ask you these screening questions? Yes Filed at: 03/25/2023 1930   Initial Alcohol Screen: US AUDIT-C     1  How often do you have a drink containing alcohol? 2 Filed at: 03/25/2023 1930   2  How many drinks containing alcohol do you have on a typical day you are drinking? 0 Filed at: 03/25/2023 1930   3a  Male UNDER 65: How often do you have five or more drinks on one occasion? 0 Filed at: 03/25/2023 1930   Audit-C Score 2 Filed at: 03/25/2023 1930   GREER: How many times in the past year have you    Used an illegal drug or used a prescription medication for non-medical reasons? Never Filed at: 03/25/2023 1930                    Medical Decision Making  66-year-old with history of orthoscopic knee surgery few weeks ago with some increased knee swelling over the past 1 day  He denies have any any significant pain with it and no associated fever or redness  He has no associated calf pain or swelling  He has no redness or drainage from his surgical incisions  He is actually able to fully range and bend his knee without any significant pain and has been doing well with physical therapy without any pain  He cannot recall any specific trauma and has actually been doing quite a bit physically    He was just concerned since it was a bit more swollen if he should be worried about an infection or a blood clot  However given his exam he has no calf swelling and no calf tenderness and has been up and mobile and his knee swelling would be a mild effusion which reviewing his recent physical exam by Dr Ismael Mina he noted a small knee effusion on his exam so 1 was present on the exam few days ago as well  So area of swelling and physical exam not consistent with a DVT  Patient with small effusion to left knee but his knee is not warm or hot to the touch and patient actually displays no tenderness to the knee at all and actually denies pain, just states that he can feel the bit of swelling  He is fully able to range and flex his knee is able to bear weight  There is no erythema or warmth to the knee as well and patient has not had any fever making infection much less likely  Pictures of his knee taken and put into the chart  Recommend ice compression and NSAIDs for mild effusion which may be reactive from continued healing from surgery  Given there was no trauma no indication for x-rays at this time  He was to follow-up with his surgeon in 1 month but discussed with him calling his surgeon to let him know he may have some additional knee effusion and whether or not he wants sooner follow-up  Discussed with him if he develops knee redness warmth fever or actually develops more significant pain those would be more concerning signs for knee infection for which he would need to have a knee aspiration for direct testing of the fluid of his knee  Patient understood and was happy with the plan  Left knee pain: self-limited or minor problem  Swelling of left knee joint: acute illness or injury  Amount and/or Complexity of Data Reviewed  External Data Reviewed: notes  Details: Notes from physical therapy and outpatient follow-up note from Dr John Brooks drug management            Disposition  Final diagnoses:   Left knee pain   Swelling of left knee joint     Time reflects when diagnosis was documented in both MDM as applicable and the Disposition within this note     Time User Action Codes Description Comment    3/25/2023  7:50 PM Melissa Vlilegas Left knee pain     3/25/2023  7:50 PM Hailey Allen Add [P13 151] Swelling of left knee joint       ED Disposition     ED Disposition   Discharge    Condition   Stable    Date/Time   Sat Mar 25, 2023 [facility] discharge to home/self care  Follow-up Information     Follow up With Specialties Details Why Ignacio Perez DO Orthopedic Surgery Call in 2 days To discuss follow-up [address]  [place] [facility] [address]            Discharge Medication List as of 3/25/2023  7:51 PM      START taking these medications    Details   !! ibuprofen (MOTRIN) 400 mg tablet Take 1 tablet (400 mg total) by mouth every 6 (six) hours as needed for mild pain, Starting Sat 3/25/2023, Normal       !! - Potential duplicate medications found  Please discuss with provider  CONTINUE these medications which have NOT CHANGED    Details   aspirin (ECOTRIN LOW STRENGTH) 81 mg EC tablet Take 1 tablet (81 mg total) by mouth 2 (two) times a day, Starting Fri 3/10/2023, Normal      !! ibuprofen (MOTRIN) 800 mg tablet Take 1 tablet (800 mg total) by mouth every 8 (eight) hours as needed for mild pain Take 1 tablet by mouth 3 times daily for 3 days, then take as needed for pain relief  Take with food and water  Discontinue if stomach upset occurs  , Starting Fri 3/10/20 23, Normal      levothyroxine 25 mcg tablet Starting Sat 10/22/2022, Historical Med      lidocaine (XYLOCAINE) 5 % ointment Apply topically as needed for mild pain, Starting Tue 2/8/2022, Normal      omeprazole (PriLOSEC) 40 MG capsule Take 40 mg by mouth daily, Starting Wed 3/13/2019, Historical Med      ondansetron (Zofran ODT) 4 mg disintegrating tablet Take 1 tablet (4 mg total) by mouth every 6 (six) hours as needed for nausea or vomiting, Starting Fri 4/8/2022, Normal      ondansetron (ZOFRAN) 4 mg tablet Take 1 tablet (4 mg total) by mouth every 8 (eight) hours as needed for nausea or vomiting, Starting Fri 3/10/2023, Normal      oxyCODONE-acetaminophen (Percocet) 5-325 mg per tablet Take 1 tablet by mouth every 4 (four) hours as needed for moderate pain Max Daily Amount: 6 tablets, Starting Fri 3/10/2023, Normal      phentermine 15 MG capsule Take 15 mg by mouth every morning, Starting Fri 2/17/2023, Until Sun 3/19/2023, Historical Med       !! - Potential duplicate medications found  Please discuss with provider  No discharge procedures on file      PDMP Review     None          ED Provider  Electronically Signed by           Mauro Cochran MD  03/27/23 6723

## 2023-03-29 ENCOUNTER — OFFICE VISIT (OUTPATIENT)
Dept: PHYSICAL THERAPY | Age: 48
End: 2023-03-29

## 2023-03-29 DIAGNOSIS — Z98.890 S/P MEDIAL MENISCECTOMY OF LEFT KNEE: Primary | ICD-10-CM

## 2023-03-29 NOTE — PROGRESS NOTES
"Daily Note     Today's date: 3/29/2023  Patient name: Luis Evans  : 1975  MRN: [id number]  Referring provider: Chula Silva DO  Dx:   Encounter Diagnosis     ICD-10-CM    1  S/P medial meniscectomy of left knee  Z98 890           Start Time: 1800  Stop Time: 1845  Total time in clinic (min): 45 minutes    Subjective: patient reports some improvement      Objective: See treatment diary below      Assessment: Tolerated treatment fair  Patient demonstrated fatigue post treatment, exhibited good technique with therapeutic exercises and would benefit from continued PT, strength is improving with mild crepitation noted with LAQ      Plan: Progress treatment as tolerated         Precautions: standard      Manuals 3/20 3/22 3/24 3/27 3/29                     MT left knee 10 min 10 min  10 min 10'  10                                  Neuro Re-Ed                                                                                                        Ther Ex             Nu step 5 min 10 min  10 min  10'  10        slant 30 sec/4x 30 sec/4x 30 sec x4 30''x4  4x        Hip abd 40/30 40#/30 45#/30 45# 30x 45/30        Hip add 40/30 40#/30 45#/30 45# 30x  45/30        Ball heel slides 30x 30x 30x 30x  30x        SLR 2x10 3x10 2# 2x10 3# 3x10  3/30        SAQ 3/30 3#/30 3#/30 3# 30x 4/30        LAQ 3/30 3#/30 3#/30 3# 30x 4/30        Fwd step ups   30x 6\" 30x 30x        Lat step ups   30x 6\" 30x  30x        Leg press  75# 30x 75# 30x 75# 30x  80/30        Calf press   55# 30x 55# 30x 55/30                                               Ther Activity             bike     4 min        Gait Training                                       Modalities             ice 10 min Def def  def                          "

## 2023-03-30 ENCOUNTER — APPOINTMENT (OUTPATIENT)
Dept: PHYSICAL THERAPY | Age: 48
End: 2023-03-30

## 2023-04-03 ENCOUNTER — OFFICE VISIT (OUTPATIENT)
Dept: PHYSICAL THERAPY | Age: 48
End: 2023-04-03

## 2023-04-03 DIAGNOSIS — Z98.890 S/P MEDIAL MENISCECTOMY OF LEFT KNEE: Primary | ICD-10-CM

## 2023-04-05 ENCOUNTER — OFFICE VISIT (OUTPATIENT)
Dept: PHYSICAL THERAPY | Age: 48
End: 2023-04-05

## 2023-04-05 DIAGNOSIS — Z98.890 S/P MEDIAL MENISCECTOMY OF LEFT KNEE: Primary | ICD-10-CM

## 2023-04-05 NOTE — PROGRESS NOTES
"Daily Note     Today's date: 2023  Patient name: Dudley Polk  : 1975  MRN: [id number]  Referring provider: Jesusita Kinney DO  Dx:   Encounter Diagnosis     ICD-10-CM    1  S/P medial meniscectomy of left knee  Z98 890           Start Time: 0700  Stop Time: 0745  Total time in clinic (min): 45 minutes    Subjective: No new concerns, no pain upon arrival       Objective: See treatment diary below      Assessment: Tolerated treatment well  Good recall of program  Patient self progressed weighted machines as documented below without issues  No c/o pain t/o session  Improved HS flexibility  Monitoring to ensure proper dosage is performed and proper form is maintained  Patient would benefit from continued PT  Plan: Continue per plan of care        Precautions: standard      Manuals 3/20 3/22 3/24 3/27 3/29 4/3 4/5                   MT left knee 10 min 10 min  10 min 10'  10 10 10'                                 Neuro Re-Ed                                                                                                        Ther Ex             Nu step 5 min 10 min  10 min  10'  10 5 10'      slant 30 sec/4x 30 sec/4x 30 sec x4 30''x4  4x 4x 4x      Hip abd 40/30 40#/30 45#/30 45# 30x 45/30 60/30 70# 30x      Hip add 40/30 40#/30 45#/30 45# 30x  45/30 60/30 70# 30x       Ball heel slides 30x 30x 30x 30x  30x D/C       SLR 2x10 3x10 2# 2x10 3# 3x10  3/30 3/30 3# 30x      SAQ 3/30 3#/30 3#/30 3# 30x 4/30 5/30 5# 30x      LAQ 3/30 3#/30 3#/30 3# 30x 4/30 5/30 5# 30x       Fwd step ups   30x 6\" 30x 30x 30x 6\" 30x      Lat step ups   30x 6\" 30x  30x 30x 6\" 30x      Leg press  75# 30x 75# 30x 75# 30x  80/30 90/30 150# 30x      Calf press   55# 30x 55# 30x 55/30 60/30 70# 30x                                             Ther Activity             bike     4 min 5 NV      Gait Training                                       Modalities             ice 10 min Def def  def  Def                         "

## 2023-04-07 ENCOUNTER — APPOINTMENT (OUTPATIENT)
Dept: PHYSICAL THERAPY | Age: 48
End: 2023-04-07

## 2023-04-12 ENCOUNTER — APPOINTMENT (OUTPATIENT)
Dept: PHYSICAL THERAPY | Age: 48
End: 2023-04-12

## 2023-04-24 ENCOUNTER — EVALUATION (OUTPATIENT)
Dept: PHYSICAL THERAPY | Age: 48
End: 2023-04-24

## 2023-04-24 DIAGNOSIS — Z98.890 S/P MEDIAL MENISCECTOMY OF LEFT KNEE: Primary | ICD-10-CM

## 2023-04-24 NOTE — PROGRESS NOTES
"DISCHARGE    Today's date: 2023  Patient name: Corby Reis  : 1975  MRN: [id number]  Referring provider: Italo Mendez DO  Dx:   Encounter Diagnosis     ICD-10-CM    1  S/P medial meniscectomy of left knee  Z98 890           Start Time: 1800  Stop Time: 1845  Total time in clinic (min): 45 minutes    Subjective: patient reports some improvement to knee but some PF compression reported       Objective: See treatment diary below      Assessment: Tolerated treatment fair  Patient exhibited good technique with therapeutic exercises  At this time, patient has achieved their maximum functional benefit from skilled physical therapy services and will be discharged to their HEP  Patient is in agreement with the plan of care  As a result, patient is discharged from physical therapy        Plan: D/C to [facility]     Precautions: standard      Manuals 4/24 3/22 3/24 3/27 3/29 4/3 4/5 4/10 4/18 4/19                MT left knee 10 min 10 min  10 min 10'  10 10 10'  10' 10' 10'                             Neuro Re-Ed                                                                                                        Ther Ex             Nu step 5 min 10 min  10 min  10'  10 5 10' 10' 10' 10'   slant 30 sec/4x 30 sec/4x 30 sec x4 30''x4  4x 4x 4x 4x 30''x4 30\"x4   Hip abd 70/30 40#/30 45#/30 45# 30x 45/30 60/30 70# 30x 70# 30x 70# 30x 70# 30x   Hip add 70/30 40#/30 45#/30 45# 30x  45/30 60/30 70# 30x  70# 30x 70# 30x 70# 30x   Ball heel slides 30x 30x 30x 30x  30x D/C       SLR 5#/30 3x10 2# 2x10 3# 3x10  3/30 3/30 3# 30x 3# 30x 5# x30 5# 30x   SAQ 5/30 3#/30 3#/30 3# 30x 4/30 5/30 5# 30x 5# 30x 5# x30 5# 30x   LAQ 5/30 3#/30 3#/30 3# 30x 4/30 5/30 5# 30x  5# 30x 5# x30    Fwd step ups 30x  30x 6\" 30x 30x 30x 6\" 30x 6\" 30x 6\" x30    Lat step ups 30x  30x 6\" 30x  30x 30x 6\" 30x 6\"x30 6\" x30    Leg press 150/30 75# 30x 75# 30x 75# 30x  80/30 90/30 150# 30x 150# 30x 150# 30x 150# 30x   Calf press 70/30  55# 30x 55# 30x " 55/30 60/30 70# 30x 70# 30x 70# 30x 70# 30x                                          Ther Activity             bike     4 min 5 NV      Gait Training                                       Modalities             ice 10 min Def def  def  Def

## 2023-04-26 ENCOUNTER — APPOINTMENT (OUTPATIENT)
Dept: PHYSICAL THERAPY | Age: 48
End: 2023-04-26

## 2023-04-27 ENCOUNTER — OFFICE VISIT (OUTPATIENT)
Dept: OBGYN CLINIC | Facility: CLINIC | Age: 48
End: 2023-04-27

## 2023-04-27 VITALS
BODY MASS INDEX: 33.86 KG/M2 | WEIGHT: 250 LBS | DIASTOLIC BLOOD PRESSURE: 79 MMHG | SYSTOLIC BLOOD PRESSURE: 118 MMHG | HEART RATE: 82 BPM | HEIGHT: 72 IN

## 2023-04-27 DIAGNOSIS — Z48.89 AFTERCARE FOLLOWING SURGERY: Primary | ICD-10-CM

## 2023-04-27 DIAGNOSIS — Z98.890 S/P LEFT KNEE ARTHROSCOPY: ICD-10-CM

## 2023-04-27 NOTE — PROGRESS NOTES
"Patient Name:  Corby Reis  MRN:  [id number]    [address] [place] [address]  Aftercare following surgery    2  S/P left knee arthroscopy      Approximately 7 week s/p Left  knee arthroscopic partial medial menisectomy, chondroplasty medial femoral condyle performed on 03/10/2023  · Overall, patient doing very well s/p surgical intervention  · Advised patient once quad strength improves, would likely see improvement of patellofemoral symptoms  · Continue activities as tolerated  May start to play volleyball    · Patient will follow up in office in 6 weeks for reevaluation    History of the Present Illness   Corby Reis is a 52 y o  male Approximately 7 week s/p Left  knee arthroscopic partial medial menisectomy, chondroplasty medial femoral condyle performed on 03/10/2023  Today, patient reports he is doing very well s/p surgical intervention  He will be starting volleyball this month  He admits to an occasional \"catching\" in the front of his knee with ambulation  Review of Systems     Review of Systems   Constitutional: Negative for chills and fever  HENT: Negative for ear pain and sore throat  Eyes: Negative for pain and visual disturbance  Respiratory: Negative for cough and shortness of breath  Cardiovascular: Negative for chest pain and palpitations  Gastrointestinal: Negative for abdominal pain and vomiting  Genitourinary: Negative for dysuria and hematuria  Musculoskeletal: Negative for arthralgias and back pain  Skin: Negative for color change and rash  Neurological: Negative for seizures and syncope  All other systems reviewed and are negative  Physical Exam     /79   Pulse 82   Ht 6' (1 829 m)   Wt 113 kg (250 lb)   BMI 33 91 kg/m²     Left  Knee  Surgical incisions well healing, without signs of infection  Range of motion from 0 to 130 degrees  There is no effusion  There is minimal residual tenderness over the medial joint line      The patient is able to " perform a straight leg raise with 5/5 quad strength  Varus stress testing reveals no pain at 0 and 30 degrees   Valgus stress testing reveals no pain at 0 and 30 degrees  Calf soft, compressible and nontender  The patient is neurovascular intact distally  Data Review     I have personally reviewed pertinent films in PACS, and my interpretation follows      No new images    Social History     Tobacco Use   • Smoking status: Former     Packs/day:  00     Years:  00     Pack years:      Types: Cigarettes     Quit date:      Years since quittin 3   • Smokeless tobacco: Never   Vaping Use   • Vaping Use: Never used   Substance Use Topics   • Alcohol use: Yes     Comment: 3-4 x year   • Drug use: No           Procedures  None     Cathy Campos PA-C

## 2023-07-28 ENCOUNTER — OFFICE VISIT (OUTPATIENT)
Dept: GASTROENTEROLOGY | Facility: CLINIC | Age: 48
End: 2023-07-28
Payer: COMMERCIAL

## 2023-07-28 VITALS
DIASTOLIC BLOOD PRESSURE: 82 MMHG | BODY MASS INDEX: 34 KG/M2 | SYSTOLIC BLOOD PRESSURE: 120 MMHG | RESPIRATION RATE: 18 BRPM | HEART RATE: 81 BPM | WEIGHT: 251 LBS | OXYGEN SATURATION: 99 % | HEIGHT: 72 IN

## 2023-07-28 DIAGNOSIS — R11.14 BILIOUS VOMITING WITH NAUSEA: ICD-10-CM

## 2023-07-28 DIAGNOSIS — K21.9 GASTROESOPHAGEAL REFLUX DISEASE, UNSPECIFIED WHETHER ESOPHAGITIS PRESENT: Primary | ICD-10-CM

## 2023-07-28 DIAGNOSIS — Z12.11 COLON CANCER SCREENING: ICD-10-CM

## 2023-07-28 PROCEDURE — 99204 OFFICE O/P NEW MOD 45 MIN: CPT | Performed by: INTERNAL MEDICINE

## 2023-07-28 NOTE — LETTER
July 28, 2023     Rigoberto Alex, [id number] [employer]  [address]    Patient: Juan Luis Manzanares   YOB: 1975   Date of Visit: 7/28/2023       Dear Dr. Kurt Jordan: Thank you for referring Juan Luis Manzanares to me for evaluation. Below are my notes for this consultation. If you have questions, please do not hesitate to call me. I look forward to following your patient along with you. Sincerely,        Lisa Thayer MD        CC: No Recipients    Lisa Thayer MD  7/28/2023  7:58 AM  Incomplete  [facility]    Dear Jigna Carmen,    I had the pleasure of seeing your patient Juan Luis Manzanares in the office today and I thank you for this kind referral.       Chief Complaint: Phlegm      HPI:  Juan Luis Manzanares is a 52 y.o. male who presents with a long history of significant phlegm with occasional vomiting secondary to this. He does have postnasal drip. However he has seen an ENT who thinks it might be due to reflux. He has never had an EGD. He has a long history of GERD and has been taking proton pump inhibitors for years. He has occasional vomiting when the phlegm gets very bad. He had an episode of spontaneous vomiting following a meal.  He does not get dysphagia or odynophagia. Occasional nausea. No other significant GI symptomatology. No melena. No involuntary weight loss. No chest pain or shortness of breath. The patient has never had a colonoscopy. No significant family history. He has no other complaints of any kind. .      Review of Systems:   Constitutional: No fever or chills, feels well, no tiredness, no recent weight gain or weight loss. HENT: No complaints of earache, no hearing loss, no nosebleeds, no nasal discharge, no sore throat, no hoarseness. Eyes: No complaints of eye pain, no red eyes, no discharge from eyes, no itchy eyes.   Cardiovascular: No complaints of slow heart rate, no fast heart rate, no chest pain, no palpitations, no leg claudication, no lower extremity edema. Respiratory: No complaints of shortness of breath, no wheezing, no cough, no SOB on exertion, no orthopnea. Gastrointestinal: As noted in HPI  Genitourinary: No complaints of dysuria, no incontinence, no hesitancy, no nocturia. Musculoskeletal: No complaints of arthralgia, no myalgias, no joint swelling or stiffness, no limb pain or swelling. Neurological: No complaints of headache, no confusion, no convulsions, no numbness or tingling, no dizziness or fainting, no limb weakness, no difficulty walking. Skin: No complaints of skin rash or skin lesions, no itching, no skin wound, no dry skin. Hematological/Lymphatic: No complaints of swollen glands, does not bleed easy. Allergic/Immunologic: No immunocompromised state. Endocrine:  No complaints of polyuria, no polydipsia. Psychiatric/Behavioral: is not suicidal, no sleep disturbances, no anxiety or depression, no change in personality, no emotional problems.        Historical Information   Past Medical History:   Diagnosis Date   • Disease of thyroid gland    • GERD (gastroesophageal reflux disease)    • Hypertension    • Rotator cuff tear      Past Surgical History:   Procedure Laterality Date   • KNEE SURGERY Bilateral     torn ligament    • MENISCECTOMY Left 3/10/2023    Procedure: ARTHROSCOPY KNEE- Left Knee partial medial menisectomy, Chondroplasty, medial femoral condyle;  Surgeon: Sebastián Ridley DO;  Location: [facility];  Service: Orthopedics   • [place] ARTHRS KNE SURG W/MENISCECTOMY MED/LAT W/SHVG Right 4/8/2022    Procedure: ARTHROSCOPY KNEE- Right Knee partial medial menisectomy;  Surgeon: Sebastián Ridley DO;  Location: [facility];  Service: Orthopedics   • VASECTOMY       Social History   Social History     Substance and Sexual Activity   Alcohol Use Yes    Comment: 3-4 x year     Social History     Substance and Sexual Activity   Drug Use No     Social History     Tobacco Use Smoking Status Former   • Packs/day: 1.00   • Years: 26.00   • Total pack years: 26.00   • Types: Cigarettes   • Quit date:    • Years since quittin.5   Smokeless Tobacco Never     Family History   Problem Relation Age of Onset   • No Known Problems Mother    • SELINA disease Father          Current Medications: has a current medication list which includes the following prescription(s): omeprazole, aspirin, ibuprofen, ibuprofen, levothyroxine, lidocaine, ondansetron, ondansetron, oxycodone-acetaminophen, and phentermine. Vital Signs: /82   Pulse 81   Resp 18   Ht 6' (1.829 m)   Wt 114 kg (251 lb)   SpO2 99%   BMI 34.04 kg/m²     Physical Exam:   Constitutional  General Appearance: No acute distress, well appearing and well nourished  Head  Normocephalic  Eyes  Conjunctivae and lids: No swelling, erythema, or discharge. Pupils and irises: Equal, round and reactive to light. Ears, Nose, Mouth, and Throat  External inspection of ears and nose: Normal  Nasal mucosa, septum and turbinates: Normal without edema or erythema/   Oropharynx: Normal with no erythema, edema, exudate or lesions. Neck  Normal range of motion. Neck supple. Cardiovascular  Auscultation of the heart: Normal rate and rhythm, normal S1 and S2 without murmurs. Examination of the extremities for edema and/or varicosities: Normal  Pulmonary/Chest  Respiratory effort: No increased work of breathing or signs of respiratory distress. Auscultation of lungs: Clear to auscultation, equal breath sounds bilaterally, no wheezes, rales, no rhonchi. Abdomen  Abdomen: Non-tender, no masses. Liver and spleen: No hepatomegaly or splenomegaly. Musculoskeletal  Gait and station: normal.  Digits and Nails: normal without clubbing or cyanosis. Inspection/palpation of joints, bones, and muscles: Normal  Neurological  No nystagmus or asterixis. Skin  Skin and subcutaneous tissue: Normal without rashes or lesions. Lymphatic  Palpation of the lymph nodes in neck: No lymphadenopathy. Psychiatric  Orientation to person, place and time: Normal.  Mood and affect: Normal.         Labs:   Lab Results   Component Value Date    ALT 39 03/06/2023    AST 24 03/06/2023    BUN 17 03/06/2023    CALCIUM 9.0 03/06/2023     03/06/2023    CO2 27 03/06/2023    CREATININE 0.98 03/06/2023    HCT 40.5 03/24/2022    HGB 13.0 03/24/2022    HGBA1C 5.9 (H) 02/15/2023     03/24/2022    K 4.2 03/06/2023    WBC 6.58 03/24/2022         X-Rays & Procedures:   No orders to display         ______________________________________________________________________      Assessment & Plan:      Diagnoses and all orders for this visit:    Gastroesophageal reflux disease, unspecified whether esophagitis present  -     EGD; Future    Bilious vomiting with nausea  -     EGD; Future    Colon cancer screening  -     Colonoscopy; Future          The patient will follow a full antireflux regimen and get a wedge. He will increase his omeprazole to twice daily. He will do all this for a period of 2 weeks to see if it has any effect at all on his phlegm issue. If it does we may certainly attribute some of this to his GERD. However if it does not it is likely not due to GERD. I have also advised a screening colonoscopy. I will be happy to inform you of his results and progress. I would like to thank you for allowing me to participate in his care.           With warmest regards,    Lindsey Dunlap MD, [facility]

## 2023-07-28 NOTE — PROGRESS NOTES
[facility]    Dear Ghanshyam Simmons,    I had the pleasure of seeing your patient Luiza Carson in the office today and I thank you for this kind referral.       Chief Complaint: Phlegm      HPI:  Luiza Carson is a 52 y.o. male who presents with a long history of significant phlegm with occasional vomiting secondary to this. He does have postnasal drip. However he has seen an ENT who thinks it might be due to reflux. He has never had an EGD. He has a long history of GERD and has been taking proton pump inhibitors for years. He has occasional vomiting when the phlegm gets very bad. He had an episode of spontaneous vomiting following a meal.  He does not get dysphagia or odynophagia. Occasional nausea. No other significant GI symptomatology. No melena. No involuntary weight loss. No chest pain or shortness of breath. The patient has never had a colonoscopy. No significant family history. He has no other complaints of any kind. .      Review of Systems:   Constitutional: No fever or chills, feels well, no tiredness, no recent weight gain or weight loss. HENT: No complaints of earache, no hearing loss, no nosebleeds, no nasal discharge, no sore throat, no hoarseness. Eyes: No complaints of eye pain, no red eyes, no discharge from eyes, no itchy eyes. Cardiovascular: No complaints of slow heart rate, no fast heart rate, no chest pain, no palpitations, no leg claudication, no lower extremity edema. Respiratory: No complaints of shortness of breath, no wheezing, no cough, no SOB on exertion, no orthopnea. Gastrointestinal: As noted in HPI  Genitourinary: No complaints of dysuria, no incontinence, no hesitancy, no nocturia. Musculoskeletal: No complaints of arthralgia, no myalgias, no joint swelling or stiffness, no limb pain or swelling.    Neurological: No complaints of headache, no confusion, no convulsions, no numbness or tingling, no dizziness or fainting, no limb weakness, no difficulty walking. Skin: No complaints of skin rash or skin lesions, no itching, no skin wound, no dry skin. Hematological/Lymphatic: No complaints of swollen glands, does not bleed easy. Allergic/Immunologic: No immunocompromised state. Endocrine:  No complaints of polyuria, no polydipsia. Psychiatric/Behavioral: is not suicidal, no sleep disturbances, no anxiety or depression, no change in personality, no emotional problems. Historical Information   Past Medical History:   Diagnosis Date   • Disease of thyroid gland    • GERD (gastroesophageal reflux disease)    • Hypertension    • Rotator cuff tear      Past Surgical History:   Procedure Laterality Date   • KNEE SURGERY Bilateral     torn ligament    • MENISCECTOMY Left 3/10/2023    Procedure: ARTHROSCOPY KNEE- Left Knee partial medial menisectomy, Chondroplasty, medial femoral condyle;  Surgeon: Easton Larose DO;  Location: [facility];  Service: Orthopedics   • [place] ARTHRS KNE SURG W/MENISCECTOMY MED/LAT W/SHVG Right 2022    Procedure: ARTHROSCOPY KNEE- Right Knee partial medial menisectomy;  Surgeon: Easton Larose DO;  Location: [facility];  Service: Orthopedics   • VASECTOMY       Social History   Social History     Substance and Sexual Activity   Alcohol Use Yes    Comment: 3-4 x year     Social History     Substance and Sexual Activity   Drug Use No     Social History     Tobacco Use   Smoking Status Former   • Packs/day: 1.00   • Years: 26.00   • Total pack years: 26.00   • Types: Cigarettes   • Quit date:    • Years since quittin.5   Smokeless Tobacco Never     Family History   Problem Relation Age of Onset   • No Known Problems Mother    • SELINA disease Father          Current Medications: has a current medication list which includes the following prescription(s): omeprazole, aspirin, ibuprofen, ibuprofen, levothyroxine, lidocaine, ondansetron, ondansetron, oxycodone-acetaminophen, and phentermine.        Vital Signs: BP 120/82   Pulse 81   Resp 18   Ht 6' (1.829 m)   Wt 114 kg (251 lb)   SpO2 99%   BMI 34.04 kg/m²     Physical Exam:   Constitutional  General Appearance: No acute distress, well appearing and well nourished  Head  Normocephalic  Eyes  Conjunctivae and lids: No swelling, erythema, or discharge. Pupils and irises: Equal, round and reactive to light. Ears, Nose, Mouth, and Throat  External inspection of ears and nose: Normal  Nasal mucosa, septum and turbinates: Normal without edema or erythema/   Oropharynx: Normal with no erythema, edema, exudate or lesions. Neck  Normal range of motion. Neck supple. Cardiovascular  Auscultation of the heart: Normal rate and rhythm, normal S1 and S2 without murmurs. Examination of the extremities for edema and/or varicosities: Normal  Pulmonary/Chest  Respiratory effort: No increased work of breathing or signs of respiratory distress. Auscultation of lungs: Clear to auscultation, equal breath sounds bilaterally, no wheezes, rales, no rhonchi. Abdomen  Abdomen: Non-tender, no masses. Liver and spleen: No hepatomegaly or splenomegaly. Musculoskeletal  Gait and station: normal.  Digits and Nails: normal without clubbing or cyanosis. Inspection/palpation of joints, bones, and muscles: Normal  Neurological  No nystagmus or asterixis. Skin  Skin and subcutaneous tissue: Normal without rashes or lesions. Lymphatic  Palpation of the lymph nodes in neck: No lymphadenopathy.    Psychiatric  Orientation to person, place and time: Normal.  Mood and affect: Normal.         Labs:   Lab Results   Component Value Date    ALT 39 03/06/2023    AST 24 03/06/2023    BUN 17 03/06/2023    CALCIUM 9.0 03/06/2023     03/06/2023    CO2 27 03/06/2023    CREATININE 0.98 03/06/2023    HCT 40.5 03/24/2022    HGB 13.0 03/24/2022    HGBA1C 5.9 (H) 02/15/2023     03/24/2022    K 4.2 03/06/2023    WBC 6.58 03/24/2022         X-Rays & Procedures:   No orders to display ______________________________________________________________________      Assessment & Plan:      Diagnoses and all orders for this visit:    Gastroesophageal reflux disease, unspecified whether esophagitis present  -     EGD; Future    Bilious vomiting with nausea  -     EGD; Future    Colon cancer screening  -     Colonoscopy; Future          The patient will follow a full antireflux regimen and get a wedge. He will increase his omeprazole to twice daily. He will do all this for a period of 2 weeks to see if it has any effect at all on his phlegm issue. If it does we may certainly attribute some of this to his GERD. However if it does not it is likely not due to GERD. I have also advised a screening colonoscopy. I will be happy to inform you of his results and progress. I would like to thank you for allowing me to participate in his care.           With warmest regards,    Santosh Monroy MD, [facility]

## 2023-07-28 NOTE — PATIENT INSTRUCTIONS
Scheduled date of EGD/colonoscopy (as of today): 9/21/23  Physician performing EGD/colonoscopy: Annamaria Parrish  Location of EGD/colonoscopy: [facility]  Desired bowel prep reviewed with patient: Jaden/Siva  Instructions reviewed with patient by: Dandre SADLER  Clearances:

## 2023-07-30 ENCOUNTER — HOSPITAL ENCOUNTER (EMERGENCY)
Facility: HOSPITAL | Age: 48
Discharge: HOME | End: 2023-07-30
Payer: COMMERCIAL

## 2023-07-30 DIAGNOSIS — S61.211A LACERATION OF LEFT INDEX FINGER WITHOUT FOREIGN BODY WITHOUT DAMAGE TO NAIL, INITIAL ENCOUNTER: Primary | ICD-10-CM

## 2023-07-30 PROCEDURE — 12001 RPR S/N/AX/GEN/TRNK 2.5CM/<: CPT | Performed by: EMERGENCY MEDICINE

## 2023-07-30 PROCEDURE — 99284 EMERGENCY DEPT VISIT MOD MDM: CPT | Performed by: EMERGENCY MEDICINE

## 2023-07-30 PROCEDURE — 99282 EMERGENCY DEPT VISIT SF MDM: CPT

## 2023-07-30 RX ORDER — LIDOCAINE HYDROCHLORIDE 10 MG/ML
10 INJECTION, SOLUTION EPIDURAL; INFILTRATION; INTRACAUDAL; PERINEURAL ONCE
Status: COMPLETED | OUTPATIENT
Start: 2023-07-30 | End: 2023-07-30

## 2023-07-30 RX ADMIN — LIDOCAINE HYDROCHLORIDE 10 ML: 10 INJECTION, SOLUTION EPIDURAL; INFILTRATION; INTRACAUDAL at 13:36

## 2023-07-30 NOTE — DISCHARGE INSTRUCTIONS
A  personal message from Dr. Spencer Brambila,  Thank you so much for allowing me to care for you today. I pride myself in the care and attention I give all my patients. I hope you were a witness to this tonight. If for any reason your condition does not improve or worsens, or you have a question that was not answered during your visit you can feel free to text me on my personal phone #  # [phone]. I will answer to your message and continue your care past your emergency room visit. Please understand that although you are being discharged because your condition has been deemed stable and able to be managed on an outpatient setting. However your condition may worsen as part of the natural progression of the illness/condition, if this occurs please come back to the emergency department for a repeat evaluation.

## 2023-07-30 NOTE — ED NOTES
Finger cleansed with NSS, large bandaid applied & finger splint applied.      Silvana Homans, RN  07/30/23 [id number]

## 2023-07-30 NOTE — ED PROVIDER NOTES
History  Chief Complaint   Patient presents with   • Finger Laceration     Pt presents to ER with c/o's (L) 2nd digit laceration while using a  today. Significant laceration noted, bleeding somewhat controlled with pressure.  + radial pulses with brisk cap refill. Healthy appearing adult presents in no distress. This is a 43-year-old male no significant past medical history who sustained a laceration to the left index finger earlier today. The laceration is fairly large and deep and had a lot of bleeding. He has no numbness or tingling distally he can fully move his finger. No other injuries. Last tetanus immunization less than 10 years ago      History provided by:  Patient   used: No        Prior to Admission Medications   Prescriptions Last Dose Informant Patient Reported? Taking?   aspirin (ECOTRIN LOW STRENGTH) 81 mg EC tablet  Self No No   Sig: Take 1 tablet (81 mg total) by mouth 2 (two) times a day   Patient not taking: Reported on 4/27/2023   ibuprofen (MOTRIN) 400 mg tablet  Self No No   Sig: Take 1 tablet (400 mg total) by mouth every 6 (six) hours as needed for mild pain   Patient not taking: Reported on 4/27/2023   ibuprofen (MOTRIN) 800 mg tablet  Self No No   Sig: Take 1 tablet (800 mg total) by mouth every 8 (eight) hours as needed for mild pain Take 1 tablet by mouth 3 times daily for 3 days, then take as needed for pain relief. Take with food and water. Discontinue if stomach upset occurs.    Patient not taking: Reported on 4/27/2023   levothyroxine 25 mcg tablet  Self Yes No   Patient not taking: Reported on 3/20/2023   lidocaine (XYLOCAINE) 5 % ointment  Self No No   Sig: Apply topically as needed for mild pain   Patient not taking: Reported on 3/20/2023   omeprazole (PriLOSEC) 40 MG capsule  Self Yes No   Sig: Take 40 mg by mouth daily   ondansetron (ZOFRAN) 4 mg tablet  Self No No   Sig: Take 1 tablet (4 mg total) by mouth every 8 (eight) hours as needed for nausea or vomiting   Patient not taking: Reported on 2023   ondansetron (Zofran ODT) 4 mg disintegrating tablet  Self No No   Sig: Take 1 tablet (4 mg total) by mouth every 6 (six) hours as needed for nausea or vomiting   Patient not taking: Reported on 3/20/2023   oxyCODONE-acetaminophen (Percocet) 5-325 mg per tablet  Self No No   Sig: Take 1 tablet by mouth every 4 (four) hours as needed for moderate pain Max Daily Amount: 6 tablets   Patient not taking: Reported on 3/20/2023   phentermine 15 MG capsule  Self Yes No   Sig: Take 15 mg by mouth every morning      Facility-Administered Medications: None       Past Medical History:   Diagnosis Date   • Disease of thyroid gland    • GERD (gastroesophageal reflux disease)    • Hypertension    • Rotator cuff tear        Past Surgical History:   Procedure Laterality Date   • KNEE SURGERY Bilateral     torn ligament    • MENISCECTOMY Left 3/10/2023    Procedure: ARTHROSCOPY KNEE- Left Knee partial medial menisectomy, Chondroplasty, medial femoral condyle;  Surgeon: Bennett Edmondson DO;  Location: [facility];  Service: Orthopedics   • [place] ARTHRS KNE SURG W/MENISCECTOMY MED/LAT W/SHVG Right 2022    Procedure: ARTHROSCOPY KNEE- Right Knee partial medial menisectomy;  Surgeon: Bennett Edmondson DO;  Location: [facility];  Service: Orthopedics   • VASECTOMY         Family History   Problem Relation Age of Onset   • No Known Problems Mother    • SELINA disease Father      I have reviewed and agree with the history as documented.     E-Cigarette/Vaping   • E-Cigarette Use Never User      E-Cigarette/Vaping Substances   • Nicotine No    • THC No    • CBD No    • Flavoring No    • Other No    • Unknown No      Social History     Tobacco Use   • Smoking status: Former     Packs/day: 1.00     Years: 26.00     Total pack years: 26.00     Types: Cigarettes     Quit date:      Years since quittin.5   • Smokeless tobacco: Never   Vaping Use   • Vaping Use: Never used Substance Use Topics   • Alcohol use: Yes     Comment: 3-4 x year   • Drug use: No       Review of Systems   Constitutional: Negative for chills and fever. HENT: Negative for ear pain and sore throat. Eyes: Negative for pain and visual disturbance. Respiratory: Negative for cough and shortness of breath. Cardiovascular: Negative for chest pain and palpitations. Gastrointestinal: Negative for abdominal pain and vomiting. Genitourinary: Negative for dysuria and hematuria. Musculoskeletal: Negative for arthralgias and back pain. Skin: Negative for color change and rash. Neurological: Negative for seizures and syncope. All other systems reviewed and are negative. Physical Exam  Physical Exam  Vitals and nursing note reviewed. Constitutional:       General: He is not in acute distress. Appearance: Normal appearance. He is well-developed. He is obese. HENT:      Head: Normocephalic and atraumatic. Right Ear: External ear normal.      Left Ear: External ear normal.   Eyes:      Conjunctiva/sclera: Conjunctivae normal.   Cardiovascular:      Rate and Rhythm: Normal rate. Heart sounds: No murmur heard. Pulmonary:      Effort: Pulmonary effort is normal. No respiratory distress. Abdominal:      Palpations: Abdomen is soft. Tenderness: There is no abdominal tenderness. Musculoskeletal:         General: No swelling. Cervical back: Neck supple. Skin:     General: Skin is warm and dry. Capillary Refill: Capillary refill takes less than 2 seconds. Comments: Laceration on the radial side of the index finger. Deep to subcutaneous tissue. No significant artery or nerve injury no tendon injury noticed. Full range of motion even against resistance of the finger. Procedure Note: (performed by me)  Consent was verbally obtained prior to procedure. Indications, risk, and benefits were explained at the length. Universal protocol: Timeout was performed. Patient, side, site and procedure was verified prior to procedure. Laceration length 1.9 cm,   Anesthesia: Field block with lidocaine w/ epi was performed by subcutaneous infiltration approximately 5 cc  Irritated with NS copiously, and explored. No foreign bodies identified. Laceration closed with 3-0 x 11 stitches continuous interlocking  Patient tolerated procedure well, no complications. Patient was neurovascularly intact pre and post procedure. Dressed by [facility]. Neurological:      General: No focal deficit present. Mental Status: He is alert and oriented to person, place, and time. Psychiatric:         Mood and Affect: Mood normal.         Thought Content: Thought content normal.         Judgment: Judgment normal.         Vital Signs  ED Triage Vitals   Temp Pulse Resp BP SpO2   -- -- -- -- --      Temp src Heart Rate Source Patient Position - Orthostatic VS BP Location FiO2 (%)   -- -- -- -- --      Pain Score       --           There were no vitals filed for this visit.       Visual Acuity      ED Medications  Medications   lidocaine (PF) (XYLOCAINE-MPF) 1 % injection 10 mL (10 mL Infiltration Given 7/30/23 1336)       Diagnostic Studies  Results Reviewed     None                 No orders to display              Procedures  Procedures         ED Course                                             MDM    Disposition  Final diagnoses:   Laceration of left index finger without foreign body without damage to nail, initial encounter     Time reflects when diagnosis was documented in both MDM as applicable and the Disposition within this note     Time User Action Codes Description Comment    7/30/2023  1:50 PM Ebenezer Parsons Add [P32.876R] Laceration of left index finger without foreign body without damage to nail, initial encounter       ED Disposition     ED Disposition   Discharge    Condition   Stable    Date/Time   Sun Jul 30, 2023  1:50 PM    Comment   Elnoria Lefort discharge to home/self care.               Follow-up Information     Follow up With Specialties Details Why [address], CRNP Nurse Practitioner In 2 weeks for suture removal [address]  [address]  [address]  [phone]            Patient's Medications   Discharge Prescriptions    No medications on file       No discharge procedures on file.     PDMP Review     None          ED Provider  Electronically Signed by           Marcelino England MD  07/30/23 4731

## 2023-08-11 ENCOUNTER — OFFICE VISIT (OUTPATIENT)
Dept: URGENT CARE | Facility: MEDICAL CENTER | Age: 48
End: 2023-08-11
Payer: COMMERCIAL

## 2023-08-11 VITALS
RESPIRATION RATE: 18 BRPM | TEMPERATURE: 97.8 F | DIASTOLIC BLOOD PRESSURE: 83 MMHG | BODY MASS INDEX: 34 KG/M2 | WEIGHT: 251 LBS | HEIGHT: 72 IN | HEART RATE: 71 BPM | OXYGEN SATURATION: 98 % | SYSTOLIC BLOOD PRESSURE: 124 MMHG

## 2023-08-11 DIAGNOSIS — Z48.02 ENCOUNTER FOR REMOVAL OF SUTURES: Primary | ICD-10-CM

## 2023-08-11 DIAGNOSIS — T14.8XXA WOUND INFECTION: ICD-10-CM

## 2023-08-11 DIAGNOSIS — L08.9 WOUND INFECTION: ICD-10-CM

## 2023-08-11 PROCEDURE — 99213 OFFICE O/P EST LOW 20 MIN: CPT | Performed by: PHYSICIAN ASSISTANT

## 2023-08-11 PROCEDURE — S9088 SERVICES PROVIDED IN URGENT: HCPCS | Performed by: PHYSICIAN ASSISTANT

## 2023-08-11 RX ORDER — CEPHALEXIN 500 MG/1
500 CAPSULE ORAL EVERY 6 HOURS SCHEDULED
Qty: 28 CAPSULE | Refills: 0 | Status: SHIPPED | OUTPATIENT
Start: 2023-08-11 | End: 2023-08-18

## 2023-08-11 NOTE — PROGRESS NOTES
[facility]        NAME: Linda Jenkins is a 52 y.o. male  : 1975    MRN: [id number]  DATE: 2023  TIME: 9:53 AM    Assessment and Plan   Encounter for removal of sutures [Z48.02]  1. Encounter for removal of sutures  cephalexin (KEFLEX) 500 mg capsule      2. Wound infection  cephalexin (KEFLEX) 500 mg capsule            Patient Instructions       Follow up with PCP in 3-5 days. Proceed to  ER if symptoms worsen. Chief Complaint     Chief Complaint   Patient presents with   • Wound Check     Had sutures placed [id number] [facility] ER two Saturdays ago on left hand pointer finger. Concerned area is red and swollen still. Wants to make sure no infection. History of Present Illness       Patient is here for evaluation of follow-up for his laceration to his left hand. Patient was seen in the emergency room and had sutures placed. He states that he noted some swelling and some redness over part of the wound. He denies any purulent drainage. He denies any increased pain but states it is still sore. He states he still has some focal numbness at the site of the laceration. Patient is 12 days post laceration repair      Review of Systems   Review of Systems   Constitutional: Negative. Musculoskeletal: Negative. Skin: Positive for color change and wound. Negative for pallor and rash. Neurological: Positive for numbness (Focal at the site of the laceration). Negative for dizziness, tremors, seizures, syncope, facial asymmetry, speech difficulty, weakness, light-headedness and headaches.          Current Medications       Current Outpatient Medications:   •  cephalexin (KEFLEX) 500 mg capsule, Take 1 capsule (500 mg total) by mouth every 6 (six) hours for 7 days, Disp: 28 capsule, Rfl: 0  •  omeprazole (PriLOSEC) 40 MG capsule, Take 40 mg by mouth daily, Disp: , Rfl:   •  aspirin (ECOTRIN LOW STRENGTH) 81 mg EC tablet, Take 1 tablet (81 mg total) by mouth 2 (two) times a day (Patient not taking: Reported on 4/27/2023), Disp: 28 tablet, Rfl: 0  •  ibuprofen (MOTRIN) 400 mg tablet, Take 1 tablet (400 mg total) by mouth every 6 (six) hours as needed for mild pain (Patient not taking: Reported on 4/27/2023), Disp: 30 tablet, Rfl: 0  •  ibuprofen (MOTRIN) 800 mg tablet, Take 1 tablet (800 mg total) by mouth every 8 (eight) hours as needed for mild pain Take 1 tablet by mouth 3 times daily for 3 days, then take as needed for pain relief. Take with food and water. Discontinue if stomach upset occurs.  (Patient not taking: Reported on 4/27/2023), Disp: 30 tablet, Rfl: 0  •  levothyroxine 25 mcg tablet, , Disp: , Rfl:   •  lidocaine (XYLOCAINE) 5 % ointment, Apply topically as needed for mild pain (Patient not taking: Reported on 3/20/2023), Disp: 35.44 g, Rfl: 2  •  ondansetron (Zofran ODT) 4 mg disintegrating tablet, Take 1 tablet (4 mg total) by mouth every 6 (six) hours as needed for nausea or vomiting (Patient not taking: Reported on 3/20/2023), Disp: 20 tablet, Rfl: 0  •  ondansetron (ZOFRAN) 4 mg tablet, Take 1 tablet (4 mg total) by mouth every 8 (eight) hours as needed for nausea or vomiting (Patient not taking: Reported on 4/27/2023), Disp: 20 tablet, Rfl: 0  •  oxyCODONE-acetaminophen (Percocet) 5-325 mg per tablet, Take 1 tablet by mouth every 4 (four) hours as needed for moderate pain Max Daily Amount: 6 tablets (Patient not taking: Reported on 3/20/2023), Disp: 7 tablet, Rfl: 0  •  phentermine 15 MG capsule, Take 15 mg by mouth every morning, Disp: , Rfl:     Current Allergies     Allergies as of 08/11/2023   • (No Known Allergies)            The following portions of the patient's history were reviewed and updated as appropriate: allergies, current medications, past family history, past medical history, past social history, past surgical history and problem list.     Past Medical History:   Diagnosis Date   • Disease of thyroid gland    • GERD (gastroesophageal reflux disease)    • Hypertension    • Rotator cuff tear        Past Surgical History:   Procedure Laterality Date   • KNEE SURGERY Bilateral     torn ligament    • MENISCECTOMY Left 3/10/2023    Procedure: ARTHROSCOPY KNEE- Left Knee partial medial menisectomy, Chondroplasty, medial femoral condyle;  Surgeon: Mendel Conte DO;  Location: [facility];  Service: Orthopedics   • [place] ARTHRS KNE SURG W/MENISCECTOMY MED/LAT W/SHVG Right 4/8/2022    Procedure: ARTHROSCOPY KNEE- Right Knee partial medial menisectomy;  Surgeon: Mendel Conte DO;  Location: [facility];  Service: Orthopedics   • VASECTOMY         Family History   Problem Relation Age of Onset   • No Known Problems Mother    • SELINA disease Father          Medications have been verified. Objective   /83   Pulse 71   Temp 97.8 °F (36.6 °C) (Temporal)   Resp 18   Ht 6' (1.829 m)   Wt 114 kg (251 lb)   SpO2 98%   BMI 34.04 kg/m²   No LMP for male patient. Physical Exam     Physical Exam  Vitals and nursing note reviewed. Constitutional:       General: He is not in acute distress. Appearance: Normal appearance. He is well-developed. He is not ill-appearing, toxic-appearing or diaphoretic. HENT:      Head: Normocephalic and atraumatic. Eyes:      Extraocular Movements: Extraocular movements intact. Conjunctiva/sclera: Conjunctivae normal.      Pupils: Pupils are equal, round, and reactive to light. Skin:     General: Skin is warm and dry. Neurological:      General: No focal deficit present. Mental Status: He is alert and oriented to person, place, and time. Psychiatric:         Mood and Affect: Mood normal.         Behavior: Behavior normal.         Thought Content:  Thought content normal.         Judgment: Judgment normal.       Suture removal    Date/Time: 8/11/2023 8:30 AM    Performed by: Link Quiroz PA-C  Authorized by: Link Quiroz PA-C  Universal Protocol:  Consent: Verbal consent obtained. Risks and benefits: risks, benefits and alternatives were discussed  Consent given by: patient  Patient understanding: patient does not state understanding of the procedure being performed  Patient identity confirmed: verbally with patient        Patient location:  Clinic  Location:     Laterality:  Left    Location:  Upper extremity    Upper extremity location:  Hand    Hand location:  L hand  Procedure details: Tools used:  Suture removal kit    Wound appearance:  Tender, warm, red and clean    Sutures removed: Running suture removed. Post-procedure details:     Post-removal:  Dressing applied and Steri-Strips applied    Patient tolerance of procedure: Patient tolerated the procedure. Complication (if applicable): For facial wound dehiscence on the palmar aspect of the laceration. Steri-Strips applied and dry Coban dressing applied  Comments: The left index finger is neurovascularly intact distally. Patient does have some focal numbness at the site of the laceration. Recommend follow-up with the orthopedic hand specialist for further evaluation.

## 2023-08-11 NOTE — PATIENT INSTRUCTIONS
Take probiotics daily while on the antibiotics    Follow-up with orthopedic hand specialist if symptoms persist    Go to the emergency room if your symptoms are worsening    Continue local wound care daily as directed

## 2023-09-21 ENCOUNTER — HOSPITAL ENCOUNTER (OUTPATIENT)
Dept: GASTROENTEROLOGY | Facility: HOSPITAL | Age: 48
Setting detail: OUTPATIENT SURGERY
End: 2023-09-21
Attending: INTERNAL MEDICINE
Payer: COMMERCIAL

## 2023-09-21 ENCOUNTER — ANESTHESIA EVENT (OUTPATIENT)
Dept: GASTROENTEROLOGY | Facility: HOSPITAL | Age: 48
End: 2023-09-21

## 2023-09-21 ENCOUNTER — ANESTHESIA (OUTPATIENT)
Dept: GASTROENTEROLOGY | Facility: HOSPITAL | Age: 48
End: 2023-09-21

## 2023-09-21 VITALS
OXYGEN SATURATION: 95 % | DIASTOLIC BLOOD PRESSURE: 87 MMHG | HEART RATE: 72 BPM | TEMPERATURE: 97.9 F | WEIGHT: 250.88 LBS | BODY MASS INDEX: 33.98 KG/M2 | HEIGHT: 72 IN | RESPIRATION RATE: 20 BRPM | SYSTOLIC BLOOD PRESSURE: 143 MMHG

## 2023-09-21 DIAGNOSIS — R11.14 BILIOUS VOMITING WITH NAUSEA: ICD-10-CM

## 2023-09-21 DIAGNOSIS — K21.9 GASTROESOPHAGEAL REFLUX DISEASE, UNSPECIFIED WHETHER ESOPHAGITIS PRESENT: ICD-10-CM

## 2023-09-21 DIAGNOSIS — Z12.11 COLON CANCER SCREENING: ICD-10-CM

## 2023-09-21 PROCEDURE — 88342 IMHCHEM/IMCYTCHM 1ST ANTB: CPT | Performed by: PATHOLOGY

## 2023-09-21 PROCEDURE — 88305 TISSUE EXAM BY PATHOLOGIST: CPT | Performed by: PATHOLOGY

## 2023-09-21 RX ORDER — SODIUM CHLORIDE, SODIUM LACTATE, POTASSIUM CHLORIDE, CALCIUM CHLORIDE 600; 310; 30; 20 MG/100ML; MG/100ML; MG/100ML; MG/100ML
75 INJECTION, SOLUTION INTRAVENOUS CONTINUOUS
Status: CANCELLED | OUTPATIENT
Start: 2023-09-21

## 2023-09-21 RX ORDER — SODIUM CHLORIDE, SODIUM LACTATE, POTASSIUM CHLORIDE, CALCIUM CHLORIDE 600; 310; 30; 20 MG/100ML; MG/100ML; MG/100ML; MG/100ML
INJECTION, SOLUTION INTRAVENOUS CONTINUOUS PRN
Status: DISCONTINUED | OUTPATIENT
Start: 2023-09-21 | End: 2023-09-21

## 2023-09-21 RX ORDER — LIDOCAINE HYDROCHLORIDE 20 MG/ML
INJECTION, SOLUTION EPIDURAL; INFILTRATION; INTRACAUDAL; PERINEURAL AS NEEDED
Status: DISCONTINUED | OUTPATIENT
Start: 2023-09-21 | End: 2023-09-21

## 2023-09-21 RX ORDER — PROPOFOL 10 MG/ML
INJECTION, EMULSION INTRAVENOUS AS NEEDED
Status: DISCONTINUED | OUTPATIENT
Start: 2023-09-21 | End: 2023-09-21

## 2023-09-21 RX ADMIN — PROPOFOL 150 MG: 10 INJECTION, EMULSION INTRAVENOUS at 11:08

## 2023-09-21 RX ADMIN — PROPOFOL 50 MG: 10 INJECTION, EMULSION INTRAVENOUS at 11:11

## 2023-09-21 RX ADMIN — PROPOFOL 50 MG: 10 INJECTION, EMULSION INTRAVENOUS at 11:16

## 2023-09-21 RX ADMIN — LIDOCAINE HYDROCHLORIDE 60 MG: 20 INJECTION, SOLUTION EPIDURAL; INFILTRATION; INTRACAUDAL; PERINEURAL at 11:08

## 2023-09-21 RX ADMIN — PROPOFOL 30 MG: 10 INJECTION, EMULSION INTRAVENOUS at 11:28

## 2023-09-21 RX ADMIN — PROPOFOL 50 MG: 10 INJECTION, EMULSION INTRAVENOUS at 11:19

## 2023-09-21 RX ADMIN — SODIUM CHLORIDE, SODIUM LACTATE, POTASSIUM CHLORIDE, AND CALCIUM CHLORIDE: .6; .31; .03; .02 INJECTION, SOLUTION INTRAVENOUS at 10:27

## 2023-09-21 RX ADMIN — PROPOFOL 50 MG: 10 INJECTION, EMULSION INTRAVENOUS at 11:22

## 2023-09-21 RX ADMIN — PROPOFOL 50 MG: 10 INJECTION, EMULSION INTRAVENOUS at 11:14

## 2023-09-21 RX ADMIN — PROPOFOL 50 MG: 10 INJECTION, EMULSION INTRAVENOUS at 11:24

## 2023-09-21 NOTE — INTERVAL H&P NOTE
H&P reviewed. After examining the patient I find no changes in the patients condition since the H&P had been written.     Vitals:    09/21/23 1014   BP: 122/81   Pulse: 75   Resp: 14   Temp: 98.2 °F (36.8 °C)   SpO2: 93%

## 2023-09-21 NOTE — ANESTHESIA POSTPROCEDURE EVALUATION
Post-Op Assessment Note    CV Status:  Stable  Pain Score: 0    Pain management: adequate     Mental Status:  Alert and awake   Hydration Status:  Euvolemic   PONV Controlled:  None   Airway Patency:  Patent      Post Op Vitals Reviewed: Yes      Staff: CRNA         No notable events documented.     BP   132/71   Temp   97.9   Pulse  60   Resp   16   SpO2   100

## 2023-09-21 NOTE — ANESTHESIA PREPROCEDURE EVALUATION
Procedure:  COLONOSCOPY  EGD    Relevant Problems   CARDIO   (+) Hypertension      ENDO   (+) Hypothyroidism      GI/HEPATIC   (+) GERD (gastroesophageal reflux disease)      PULMONARY   (+) ELIAS (obstructive sleep apnea)      Other   (+) BMI 33.0-33.9,adult        Physical Exam    Airway    Mallampati score: II  TM Distance: >3 FB  Neck ROM: full     Dental       Cardiovascular  Cardiovascular exam normal    Pulmonary  Pulmonary exam normal     Other Findings        Anesthesia Plan  ASA Score- 2     Anesthesia Type- IV sedation with anesthesia with ASA Monitors. Additional Monitors:   Airway Plan:           Plan Factors-Exercise tolerance (METS): >4 METS. Chart reviewed. EKG reviewed. Imaging results reviewed. Existing labs reviewed. Patient summary reviewed. Induction- intravenous. Postoperative Plan-     Informed Consent- Anesthetic plan and risks discussed with patient. I personally reviewed this patient with the CRNA. Discussed and agreed on the Anesthesia Plan with the CRNA. Aidan Speaker

## 2023-09-21 NOTE — H&P
History and Physical -  [facility]  Ridge Joseph 52 y.o. male MRN: [id number]                  HPI: Ridge Joseph is a 52y.o. year old male who presents for EGD and colonoscopy for GERD, nausea and vomiting, initial average risk screening. No prior colonoscopy. No family history      REVIEW OF SYSTEMS: Per the HPI, and otherwise unremarkable. Historical Information   Past Medical History:   Diagnosis Date   • Disease of thyroid gland    • GERD (gastroesophageal reflux disease)    • Hypertension    • Rotator cuff tear      Past Surgical History:   Procedure Laterality Date   • KNEE SURGERY Bilateral     torn ligament    • MENISCECTOMY Left 3/10/2023    Procedure: ARTHROSCOPY KNEE- Left Knee partial medial menisectomy, Chondroplasty, medial femoral condyle;  Surgeon: Sim Floyd DO;  Location: [facility];  Service: Orthopedics   • [place] ARTHRS KNE SURG W/MENISCECTOMY MED/LAT W/SHVG Right 2022    Procedure: ARTHROSCOPY KNEE- Right Knee partial medial menisectomy;  Surgeon: Sim Floyd DO;  Location: [facility];  Service: Orthopedics   • VASECTOMY       Social History   Social History     Substance and Sexual Activity   Alcohol Use Yes    Comment: 3-4 x year     Social History     Substance and Sexual Activity   Drug Use No     Social History     Tobacco Use   Smoking Status Former   • Packs/day: 1.00   • Years: 26.00   • Total pack years: 26.00   • Types: Cigarettes   • Quit date:    • Years since quittin.7   Smokeless Tobacco Never     Family History   Problem Relation Age of Onset   • No Known Problems Mother    • SELINA disease Father        Meds/Allergies     (Not in a hospital admission)      No Known Allergies    Objective     Blood pressure 122/81, pulse 75, temperature 98.2 °F (36.8 °C), temperature source Tympanic, resp. rate 14, height 6' (1.829 m), weight 114 kg (250 lb 14.1 oz), SpO2 93 %.       PHYSICAL EXAM    Gen: NAD  CV: RRR  CHEST: Clear  ABD: soft, NT/ND  EXT: no edema  Neuro: AAO      ASSESSMENT/PLAN:  This is a 52y.o. year old male here for GERD nausea and vomiting, initial average risk screening    PLAN:   Procedure: EGD and colon

## 2023-09-26 ENCOUNTER — TELEPHONE (OUTPATIENT)
Age: 48
End: 2023-09-26

## 2023-09-26 DIAGNOSIS — A04.8 H. PYLORI INFECTION: Primary | ICD-10-CM

## 2023-09-26 PROCEDURE — 88342 IMHCHEM/IMCYTCHM 1ST ANTB: CPT | Performed by: PATHOLOGY

## 2023-09-26 PROCEDURE — 88305 TISSUE EXAM BY PATHOLOGIST: CPT | Performed by: PATHOLOGY

## 2023-09-26 RX ORDER — CLARITHROMYCIN 500 MG/1
500 TABLET, COATED ORAL 2 TIMES DAILY
Qty: 28 TABLET | Refills: 0 | Status: SHIPPED | OUTPATIENT
Start: 2023-09-26 | End: 2023-10-10

## 2023-09-26 RX ORDER — OMEPRAZOLE 20 MG/1
20 CAPSULE, DELAYED RELEASE ORAL EVERY MORNING
Qty: 60 CAPSULE | Refills: 5 | Status: SHIPPED | OUTPATIENT
Start: 2023-09-26

## 2023-09-26 RX ORDER — AMOXICILLIN 500 MG/1
1000 TABLET, FILM COATED ORAL 2 TIMES DAILY
Qty: 56 TABLET | Refills: 0 | Status: SHIPPED | OUTPATIENT
Start: 2023-09-26 | End: 2023-10-10

## 2023-09-26 NOTE — TELEPHONE ENCOUNTER
----- Message from Isabel Pelletier MD sent at 9/26/2023  9:34 AM EDT -----  Results given. He has H.  Pylori    Please call him and  Biaxin 500 mg p.o. twice daily x2 weeks  Amoxicillin 500 mg, 2 tabs p.o. twice daily x2 weeks  Have him increase the omeprazole to twice daily x2 weeks  Call him in a new prescription for his omeprazole, 20 mg p.o. every morning half hour AC breakfast dispense 60 with 5 refills  Stool for H. pylori antigen 1 month after treatment is finished

## 2023-10-11 ENCOUNTER — TELEPHONE (OUTPATIENT)
Age: 48
End: 2023-10-11

## 2023-10-11 NOTE — TELEPHONE ENCOUNTER
Patients GI provider:  Dr. Lj Hauser to return call: (    Reason for call: Pt calling asking if he is to still have celiac testing done if so he needs order    Scheduled procedure/appointment date if applicable: Apt/procedure

## 2023-11-17 DIAGNOSIS — A04.8 H. PYLORI INFECTION: ICD-10-CM

## 2023-11-17 RX ORDER — OMEPRAZOLE 20 MG/1
CAPSULE, DELAYED RELEASE ORAL
Qty: 90 CAPSULE | Refills: 1 | Status: SHIPPED | OUTPATIENT
Start: 2023-11-17

## 2023-11-29 ENCOUNTER — OFFICE VISIT (OUTPATIENT)
Dept: URGENT CARE | Facility: MEDICAL CENTER | Age: 48
End: 2023-11-29
Payer: COMMERCIAL

## 2023-11-29 VITALS
BODY MASS INDEX: 33.86 KG/M2 | DIASTOLIC BLOOD PRESSURE: 82 MMHG | WEIGHT: 250 LBS | RESPIRATION RATE: 18 BRPM | TEMPERATURE: 98 F | HEIGHT: 72 IN | HEART RATE: 90 BPM | SYSTOLIC BLOOD PRESSURE: 130 MMHG | OXYGEN SATURATION: 99 %

## 2023-11-29 DIAGNOSIS — J02.9 ACUTE PHARYNGITIS, UNSPECIFIED ETIOLOGY: Primary | ICD-10-CM

## 2023-11-29 LAB — S PYO AG THROAT QL: NEGATIVE

## 2023-11-29 PROCEDURE — 87880 STREP A ASSAY W/OPTIC: CPT

## 2023-11-29 PROCEDURE — S9088 SERVICES PROVIDED IN URGENT: HCPCS | Performed by: PHYSICIAN ASSISTANT

## 2023-11-29 PROCEDURE — 99213 OFFICE O/P EST LOW 20 MIN: CPT | Performed by: PHYSICIAN ASSISTANT

## 2023-11-29 PROCEDURE — 87070 CULTURE OTHR SPECIMN AEROBIC: CPT | Performed by: PHYSICIAN ASSISTANT

## 2023-11-29 NOTE — PROGRESS NOTES
[facility]        NAME: Aayush Bautista is a 52 y.o. male  : 1975    MRN: [id number]  DATE: 2023  TIME: 6:40 PM    Assessment and Plan   Acute pharyngitis, unspecified etiology [J02.9]  1. Acute pharyngitis, unspecified etiology  POCT rapid strepA            Patient Instructions     Pharyngitis  Salt water gargles  Follow up with PCP in 3-5 days. Proceed to  ER if symptoms worsen. Chief Complaint     Chief Complaint   Patient presents with    Sore Throat     Sore throat since Monday          History of Present Illness       66-year-old male who presents complaining of sore throat and pain on swallowing x3 days. Patient denies fevers, chills, cough, congestion, chest pain, shortness of breath. Sore Throat         Review of Systems   Review of Systems   HENT:  Positive for sore throat. Current Medications       Current Outpatient Medications:     aspirin (ECOTRIN LOW STRENGTH) 81 mg EC tablet, Take 1 tablet (81 mg total) by mouth 2 (two) times a day (Patient not taking: Reported on 2023), Disp: 28 tablet, Rfl: 0    ibuprofen (MOTRIN) 400 mg tablet, Take 1 tablet (400 mg total) by mouth every 6 (six) hours as needed for mild pain (Patient not taking: Reported on 2023), Disp: 30 tablet, Rfl: 0    ibuprofen (MOTRIN) 800 mg tablet, Take 1 tablet (800 mg total) by mouth every 8 (eight) hours as needed for mild pain Take 1 tablet by mouth 3 times daily for 3 days, then take as needed for pain relief. Take with food and water. Discontinue if stomach upset occurs.  (Patient not taking: Reported on 2023), Disp: 30 tablet, Rfl: 0    levothyroxine 25 mcg tablet, , Disp: , Rfl:     lidocaine (XYLOCAINE) 5 % ointment, Apply topically as needed for mild pain (Patient not taking: Reported on 3/20/2023), Disp: 35.44 g, Rfl: 2    omeprazole (PriLOSEC) 20 mg delayed release capsule, TAKE 1 CAPSULE BY MOUTH EVERY DAY IN THE MORNING, Disp: 90 capsule, Rfl: 1    omeprazole (PriLOSEC) 40 MG capsule, Take 40 mg by mouth daily, Disp: , Rfl:     ondansetron (Zofran ODT) 4 mg disintegrating tablet, Take 1 tablet (4 mg total) by mouth every 6 (six) hours as needed for nausea or vomiting, Disp: 20 tablet, Rfl: 0    ondansetron (ZOFRAN) 4 mg tablet, Take 1 tablet (4 mg total) by mouth every 8 (eight) hours as needed for nausea or vomiting, Disp: 20 tablet, Rfl: 0    oxyCODONE-acetaminophen (Percocet) 5-325 mg per tablet, Take 1 tablet by mouth every 4 (four) hours as needed for moderate pain Max Daily Amount: 6 tablets (Patient not taking: Reported on 3/20/2023), Disp: 7 tablet, Rfl: 0    phentermine 15 MG capsule, Take 15 mg by mouth every morning, Disp: , Rfl:     Current Allergies     Allergies as of 11/29/2023    (No Known Allergies)            The following portions of the patient's history were reviewed and updated as appropriate: allergies, current medications, past family history, past medical history, past social history, past surgical history and problem list.     Past Medical History:   Diagnosis Date    Disease of thyroid gland     GERD (gastroesophageal reflux disease)     Hypertension     Rotator cuff tear        Past Surgical History:   Procedure Laterality Date    KNEE SURGERY Bilateral     torn ligament     MENISCECTOMY Left 3/10/2023    Procedure: ARTHROSCOPY KNEE- Left Knee partial medial menisectomy, Chondroplasty, medial femoral condyle;  Surgeon: Altagracia Evans DO;  Location: [facility];  Service: Orthopedics    ID ARTHRS KNE SURG W/MENISCECTOMY MED/LAT W/SHVG Right 4/8/2022    Procedure: ARTHROSCOPY KNEE- Right Knee partial medial menisectomy;  Surgeon: Altagracia Evans DO;  Location: [facility];  Service: Orthopedics    VASECTOMY         Family History   Problem Relation Age of Onset    No Known Problems Mother     SELINA disease Father          Medications have been verified.         Objective   /82 (BP Location: Left arm, Patient Position: Sitting, Cuff Size: Large)   Pulse 90   Temp 98 °F (36.7 °C) (Temporal)   Resp 18   Ht 6' (1.829 m)   Wt 113 kg (250 lb)   SpO2 99%   BMI 33.91 kg/m²        Physical Exam     Physical Exam  Constitutional:       General: He is not in acute distress. Appearance: He is well-developed. He is not diaphoretic. HENT:      Head: Normocephalic and atraumatic. Right Ear: Hearing, tympanic membrane, ear canal and external ear normal.      Left Ear: Hearing, tympanic membrane, ear canal and external ear normal.      Mouth/Throat:      Pharynx: Uvula midline. Posterior oropharyngeal erythema present. No oropharyngeal exudate. Tonsils: No tonsillar abscesses. Eyes:      Conjunctiva/sclera: Conjunctivae normal.   Cardiovascular:      Rate and Rhythm: Normal rate and regular rhythm. Pulmonary:      Effort: Pulmonary effort is normal. No respiratory distress. Breath sounds: Normal breath sounds. No stridor. No wheezing, rhonchi or rales. Chest:      Chest wall: No tenderness. Musculoskeletal:      Cervical back: Normal range of motion and neck supple. Lymphadenopathy:      Cervical: Cervical adenopathy present.

## 2023-11-29 NOTE — LETTER
November 29, 2023     Patient: Ivory Montenegro   YOB: 1975   Date of Visit: 11/29/2023       To Whom it May Concern:    Ivory Montenegro was seen in my clinic on 11/29/2023. He may return to work on 12/1/23 . If you have any questions or concerns, please don't hesitate to call.          Sincerely,          [facility]        CC: No Recipients

## 2023-12-01 LAB — BACTERIA THROAT CULT: NORMAL

## 2024-03-27 ENCOUNTER — HOSPITAL ENCOUNTER (EMERGENCY)
Facility: HOSPITAL | Age: 49
Discharge: HOME | End: 2024-03-27
Attending: EMERGENCY MEDICINE
Payer: COMMERCIAL

## 2024-03-27 ENCOUNTER — APPOINTMENT (EMERGENCY)
Dept: CT IMAGING | Facility: HOSPITAL | Age: 49
End: 2024-03-27
Payer: COMMERCIAL

## 2024-03-27 VITALS
HEART RATE: 93 BPM | SYSTOLIC BLOOD PRESSURE: 205 MMHG | DIASTOLIC BLOOD PRESSURE: 88 MMHG | TEMPERATURE: 97 F | OXYGEN SATURATION: 99 % | RESPIRATION RATE: 22 BRPM

## 2024-03-27 DIAGNOSIS — R93.7 ABNORMAL CT SCAN, LUMBAR SPINE: ICD-10-CM

## 2024-03-27 DIAGNOSIS — M54.50 ACUTE LOW BACK PAIN: Primary | ICD-10-CM

## 2024-03-27 PROCEDURE — 96372 THER/PROPH/DIAG INJ SC/IM: CPT

## 2024-03-27 PROCEDURE — 72131 CT LUMBAR SPINE W/O DYE: CPT

## 2024-03-27 PROCEDURE — 99284 EMERGENCY DEPT VISIT MOD MDM: CPT | Performed by: PHYSICIAN ASSISTANT

## 2024-03-27 PROCEDURE — 99283 EMERGENCY DEPT VISIT LOW MDM: CPT

## 2024-03-27 RX ORDER — LIDOCAINE 50 MG/G
1 PATCH TOPICAL ONCE
Status: DISCONTINUED | OUTPATIENT
Start: 2024-03-27 | End: 2024-03-27 | Stop reason: HOSPADM

## 2024-03-27 RX ORDER — METHOCARBAMOL 500 MG/1
500 TABLET, FILM COATED ORAL 2 TIMES DAILY
Qty: 20 TABLET | Refills: 0 | Status: SHIPPED | OUTPATIENT
Start: 2024-03-27

## 2024-03-27 RX ORDER — PREDNISONE 20 MG/1
40 TABLET ORAL ONCE
Status: COMPLETED | OUTPATIENT
Start: 2024-03-27 | End: 2024-03-27

## 2024-03-27 RX ORDER — KETOROLAC TROMETHAMINE 30 MG/ML
15 INJECTION, SOLUTION INTRAMUSCULAR; INTRAVENOUS ONCE
Status: COMPLETED | OUTPATIENT
Start: 2024-03-27 | End: 2024-03-27

## 2024-03-27 RX ORDER — PREDNISONE 20 MG/1
40 TABLET ORAL DAILY
Qty: 10 TABLET | Refills: 0 | Status: SHIPPED | OUTPATIENT
Start: 2024-03-27

## 2024-03-27 RX ORDER — NAPROXEN 500 MG/1
500 TABLET ORAL 2 TIMES DAILY WITH MEALS
Qty: 30 TABLET | Refills: 0 | Status: SHIPPED | OUTPATIENT
Start: 2024-03-27

## 2024-03-27 RX ORDER — MORPHINE SULFATE 15 MG/1
15 TABLET ORAL EVERY 8 HOURS PRN
Qty: 12 TABLET | Refills: 0 | Status: SHIPPED | OUTPATIENT
Start: 2024-03-27 | End: 2024-04-03

## 2024-03-27 RX ORDER — ACETAMINOPHEN 325 MG/1
650 TABLET ORAL ONCE
Status: COMPLETED | OUTPATIENT
Start: 2024-03-27 | End: 2024-03-27

## 2024-03-27 RX ADMIN — ACETAMINOPHEN 650 MG: 325 TABLET, FILM COATED ORAL at 15:17

## 2024-03-27 RX ADMIN — LIDOCAINE 5% 1 PATCH: 700 PATCH TOPICAL at 15:18

## 2024-03-27 RX ADMIN — KETOROLAC TROMETHAMINE 15 MG: 30 INJECTION, SOLUTION INTRAMUSCULAR; INTRAVENOUS at 15:18

## 2024-03-27 RX ADMIN — PREDNISONE 40 MG: 20 TABLET ORAL at 15:18

## 2024-03-27 NOTE — DISCHARGE INSTRUCTIONS
Your CT reports: IMPRESSION:  Mild lumbar degenerative disc disease including small left-sided disc herniations at L2-3 and L5-S1 facet effusion resulting in mild foraminal narrowing.    Can continue topical Salonpas patches, 12 hours on/12 hours off.  Tylenol and naproxen for mild to moderate pain relief, morphine tablet only as needed for episodes of breakthrough pain.  Recommend establishing care with comprehensive spine program for further management.  Please return immediately to the ED with any new or worsening symptoms as discussed.

## 2024-03-28 ENCOUNTER — TELEPHONE (OUTPATIENT)
Dept: PHYSICAL THERAPY | Facility: OTHER | Age: 49
End: 2024-03-28

## 2024-03-30 NOTE — ED PROVIDER NOTES
History  Chief Complaint   Patient presents with    Back Pain     Right sided lower back pain for the last week, radiating down legs bilaterally. No injury he can recall.      This is a 48-year-old male arriving ambulatory to the emergency department for evaluation with complaint of acute, atraumatic lower back pain x 1 week.  Patient notes no inciting event to his knowledge.  He reports radiation of pain along the posterior aspect of both lower extremities along the thighs.  He denies any extremity paresthesias or weakness with ambulating, saddle anesthesia, bladder/bowel incontinence or retention.  He denies abdominal pain or testicular pain, difficulty urinating, bladder/bowel incontinence or retention.  Denies fevers, chills, sweats or other systemic symptoms.  Patient has no past medical history of diabetes or malignancy or social history of IV drug use.  He denies any history of similar symptoms in the past.  He has found no relief with stretching and Tylenol/Motrin.  He offers no other complaints or concerns at this time.        Prior to Admission Medications   Prescriptions Last Dose Informant Patient Reported? Taking?   aspirin (ECOTRIN LOW STRENGTH) 81 mg EC tablet  Self No No   Sig: Take 1 tablet (81 mg total) by mouth 2 (two) times a day   Patient not taking: Reported on 4/27/2023   ibuprofen (MOTRIN) 400 mg tablet  Self No No   Sig: Take 1 tablet (400 mg total) by mouth every 6 (six) hours as needed for mild pain   Patient not taking: Reported on 4/27/2023   ibuprofen (MOTRIN) 800 mg tablet  Self No No   Sig: Take 1 tablet (800 mg total) by mouth every 8 (eight) hours as needed for mild pain Take 1 tablet by mouth 3 times daily for 3 days, then take as needed for pain relief. Take with food and water. Discontinue if stomach upset occurs.   Patient not taking: Reported on 4/27/2023   levothyroxine 25 mcg tablet  Self Yes No   Patient not taking: Reported on 3/20/2023   lidocaine (XYLOCAINE) 5 % ointment   Self No No   Sig: Apply topically as needed for mild pain   Patient not taking: Reported on 3/20/2023   omeprazole (PriLOSEC) 20 mg delayed release capsule   No No   Sig: TAKE 1 CAPSULE BY MOUTH EVERY DAY IN THE MORNING   omeprazole (PriLOSEC) 40 MG capsule  Self Yes No   Sig: Take 40 mg by mouth daily   ondansetron (ZOFRAN) 4 mg tablet  Self No No   Sig: Take 1 tablet (4 mg total) by mouth every 8 (eight) hours as needed for nausea or vomiting   ondansetron (Zofran ODT) 4 mg disintegrating tablet  Self No No   Sig: Take 1 tablet (4 mg total) by mouth every 6 (six) hours as needed for nausea or vomiting   oxyCODONE-acetaminophen (Percocet) 5-325 mg per tablet  Self No No   Sig: Take 1 tablet by mouth every 4 (four) hours as needed for moderate pain Max Daily Amount: 6 tablets   Patient not taking: Reported on 3/20/2023   phentermine 15 MG capsule  Self Yes No   Sig: Take 15 mg by mouth every morning      Facility-Administered Medications: None       Past Medical History:   Diagnosis Date    Disease of thyroid gland     GERD (gastroesophageal reflux disease)     Hypertension     Rotator cuff tear        Past Surgical History:   Procedure Laterality Date    KNEE SURGERY Bilateral     torn ligament     MENISCECTOMY Left 3/10/2023    Procedure: ARTHROSCOPY KNEE- Left Knee partial medial menisectomy, Chondroplasty, medial femoral condyle;  Surgeon: Nigel Sky DO;  Location: [facility] OR;  Service: Orthopedics    VT ARTHRS KNE SURG W/MENISCECTOMY MED/LAT W/SHVG Right 4/8/2022    Procedure: ARTHROSCOPY KNEE- Right Knee partial medial menisectomy;  Surgeon: Nigel Sky DO;  Location: [facility] OR;  Service: Orthopedics    VASECTOMY         Family History   Problem Relation Age of Onset    No Known Problems Mother     SELINA disease Father      I have reviewed and agree with the history as documented.    E-Cigarette/Vaping    E-Cigarette Use Never User      E-Cigarette/Vaping Substances    Nicotine No     THC No      CBD No     Flavoring No     Other No     Unknown No      Social History     Tobacco Use    Smoking status: Former     Current packs/day: 0.00     Average packs/day: 1 pack/day for 26.0 years (26.0 ttl pk-yrs)     Types: Cigarettes     Start date:      Quit date:      Years since quittin.2    Smokeless tobacco: Never   Vaping Use    Vaping status: Never Used   Substance Use Topics    Alcohol use: Yes     Comment: 3-4 x year    Drug use: No       Review of Systems   Constitutional:  Negative for chills, diaphoresis, fatigue and fever.   Respiratory:  Negative for shortness of breath.    Cardiovascular:  Negative for chest pain.   Musculoskeletal:  Positive for back pain. Negative for gait problem and neck pain.   Neurological:  Negative for weakness and numbness.   All other systems reviewed and are negative.      Physical Exam  Physical Exam  Vitals and nursing note reviewed.   Constitutional:       General: He is not in acute distress.     Appearance: Normal appearance. He is well-developed. He is not ill-appearing, toxic-appearing or diaphoretic.   HENT:      Head: Normocephalic and atraumatic.      Right Ear: External ear normal.      Left Ear: External ear normal.   Eyes:      Conjunctiva/sclera: Conjunctivae normal.   Cardiovascular:      Rate and Rhythm: Normal rate and regular rhythm.      Pulses: Normal pulses.   Pulmonary:      Effort: Pulmonary effort is normal. No respiratory distress.      Breath sounds: Normal breath sounds. No wheezing, rhonchi or rales.   Abdominal:      General: There is no distension.      Palpations: Abdomen is soft.      Tenderness: There is no abdominal tenderness.   Musculoskeletal:         General: Normal range of motion.      Cervical back: Normal range of motion and neck supple.      Comments: There is no spinous process tenderness upon palpation of the cervical, thoracic, lumbar spine.  Patient has tenderness upon palpation of the lumbosacral regions bilaterally.   Symmetric sensation intact with palpation along both lower extremities, with symmetric 5 of 5 strength.  Patient ambulatory in the department with stable gait observed.   Skin:     General: Skin is warm and dry.      Capillary Refill: Capillary refill takes less than 2 seconds.   Neurological:      Mental Status: He is alert.      Sensory: No sensory deficit.      Motor: Motor function is intact. No weakness.      Coordination: Coordination normal.      Gait: Gait is intact. Gait normal.      Deep Tendon Reflexes: Reflexes normal.   Psychiatric:         Mood and Affect: Mood normal.         Vital Signs  ED Triage Vitals [03/27/24 1450]   Temperature Pulse Respirations Blood Pressure SpO2   (!) 97 °F (36.1 °C) 93 22 (!) 205/88 99 %      Temp Source Heart Rate Source Patient Position - Orthostatic VS BP Location FiO2 (%)   Temporal Monitor Sitting Left arm --      Pain Score       --           Vitals:    03/27/24 1450   BP: (!) 205/88   Pulse: 93   Patient Position - Orthostatic VS: Sitting         Visual Acuity      ED Medications  Medications   ketorolac (TORADOL) injection 15 mg (15 mg Intramuscular Given 3/27/24 1518)   acetaminophen (TYLENOL) tablet 650 mg (650 mg Oral Given 3/27/24 1517)   predniSONE tablet 40 mg (40 mg Oral Given 3/27/24 1518)       Diagnostic Studies  Results Reviewed       None                   CT spine lumbar without contrast   Final Result by Josh Cameron DO (03/27 1640)      Mild lumbar degenerative disc disease including small left-sided disc herniations at L2-3 and L5-S1 facet effusion resulting in mild foraminal narrowing.         Workstation performed: [id number]                    Procedures  Procedures         ED Course                                             Medical Decision Making  MDM: 48-year-old male with no pertinent past medical history arriving ambulatory to the emergency department with complaint of atraumatic lower back pain radiating along both lower  extremities.  Reports no injuries or trauma and denies history of similar symptoms in the past.  No back pain red flags.  Patient's vital signs on presentation are notable for an elevated blood pressure reading which may be situational in the setting of pain.  Examination as above.  There is low clinical suspicion for cauda equina or any neurosurgical emergency at this time.  CT obtained which reports mild lumbar degenerative disc disease with small disc herniations and mild foraminal narrowing.  Test results reviewed with the patient at bedside as well as plan for hospital discharge with outpatient comprehensive spine program and medications to assist with symptom management.  Will prescribe short-term course of morphine to utilize only as needed for episodes of breakthrough pain.  Standard narcotic precautions given.  Outpatient follow-up included in discharge paperwork and parameters for ED return discussed at length.  Patient verbalized understanding of plan as above which she is comfortable and agreeable with and he was discharged in stable condition, ambulating independently from the ED today without issue.    Amount and/or Complexity of Data Reviewed  Radiology: ordered.    Risk  OTC drugs.  Prescription drug management.             Disposition  Final diagnoses:   Acute low back pain   Abnormal CT scan, lumbar spine     Time reflects when diagnosis was documented in both MDM as applicable and the Disposition within this note       Time User Action Codes Description Comment    3/27/2024  4:47 PM Sarai Jain Add [M54.50] Acute low back pain     3/27/2024  4:47 PM Sarai Jain Add [R93.7] Abnormal CT scan, lumbar spine           ED Disposition       ED Disposition   Discharge    Condition   Stable    Date/Time   Wed Mar 27, 2024  4:47 PM    Comment   Ash Cooper discharge to home/self care.                   Follow-up Information       Follow up With Specialties Details Why Contact Info Additional  Information    JACQUELINE Braxton Nurse Practitioner   [address]  [address]  [facility] [address]  [phone]       [facility] Physical Therapy   [phone]    [facility] Emergency Department Emergency Medicine  If symptoms worsen [address] [facility] [id number] [phone] [facility] Emergency Department, [address], [place], [address]            Discharge Medication List as of 3/27/2024  4:51 PM        START taking these medications    Details   methocarbamol (ROBAXIN) 500 mg tablet Take 1 tablet (500 mg total) by mouth 2 (two) times a day, Starting Wed 3/27/2024, Normal      morphine (MSIR) 15 mg tablet Take 1 tablet (15 mg total) by mouth every 8 (eight) hours as needed for severe pain for up to 7 days Max Daily Amount: 45 mg, Starting Wed 3/27/2024, Until Wed 4/3/2024 at 2359, Normal      naproxen (Naprosyn) 500 mg tablet Take 1 tablet (500 mg total) by mouth 2 (two) times a day with meals, Starting Wed 3/27/2024, Normal      predniSONE 20 mg tablet Take 2 tablets (40 mg total) by mouth daily, Starting Wed 3/27/2024, Normal           CONTINUE these medications which have NOT CHANGED    Details   aspirin (ECOTRIN LOW STRENGTH) 81 mg EC tablet Take 1 tablet (81 mg total) by mouth 2 (two) times a day, Starting Fri 3/10/2023, Normal      !! ibuprofen (MOTRIN) 400 mg tablet Take 1 tablet (400 mg total) by mouth every 6 (six) hours as needed for mild pain, Starting Sat 3/25/2023, Normal      !! ibuprofen (MOTRIN) 800 mg tablet Take 1 tablet (800 mg total) by mouth every 8 (eight) hours as needed for mild pain Take 1 tablet by mouth 3 times daily for 3 days, then take as needed for pain relief. Take with food and water. Discontinue if stomach upset occurs., Starting Fri 3/10/20 23, Normal      levothyroxine 25 mcg tablet Starting Sat 10/22/2022, Historical Med      lidocaine  (XYLOCAINE) 5 % ointment Apply topically as needed for mild pain, Starting Tue 2/8/2022, Normal      !! omeprazole (PriLOSEC) 20 mg delayed release capsule TAKE 1 CAPSULE BY MOUTH EVERY DAY IN THE MORNING, Normal      !! omeprazole (PriLOSEC) 40 MG capsule Take 40 mg by mouth daily, Starting Wed 3/13/2019, Historical Med      ondansetron (Zofran ODT) 4 mg disintegrating tablet Take 1 tablet (4 mg total) by mouth every 6 (six) hours as needed for nausea or vomiting, Starting Fri 4/8/2022, Normal      ondansetron (ZOFRAN) 4 mg tablet Take 1 tablet (4 mg total) by mouth every 8 (eight) hours as needed for nausea or vomiting, Starting Fri 3/10/2023, Normal      oxyCODONE-acetaminophen (Percocet) 5-325 mg per tablet Take 1 tablet by mouth every 4 (four) hours as needed for moderate pain Max Daily Amount: 6 tablets, Starting Fri 3/10/2023, Normal      phentermine 15 MG capsule Take 15 mg by mouth every morning, Starting Fri 2/17/2023, Until Thu 4/27/2023, Historical Med       !! - Potential duplicate medications found. Please discuss with provider.              PDMP Review       None            ED Provider  Electronically Signed by             Sarai Jain PA-C  03/30/24 0491

## 2024-04-03 ENCOUNTER — EVALUATION (OUTPATIENT)
Dept: PHYSICAL THERAPY | Age: 49
End: 2024-04-03
Payer: COMMERCIAL

## 2024-04-03 DIAGNOSIS — M54.42 ACUTE MIDLINE LOW BACK PAIN WITH BILATERAL SCIATICA: ICD-10-CM

## 2024-04-03 DIAGNOSIS — M54.41 ACUTE MIDLINE LOW BACK PAIN WITH BILATERAL SCIATICA: ICD-10-CM

## 2024-04-03 PROCEDURE — 97140 MANUAL THERAPY 1/> REGIONS: CPT | Performed by: PHYSICAL THERAPIST

## 2024-04-03 PROCEDURE — 97161 PT EVAL LOW COMPLEX 20 MIN: CPT | Performed by: PHYSICAL THERAPIST

## 2024-04-03 PROCEDURE — 97110 THERAPEUTIC EXERCISES: CPT | Performed by: PHYSICAL THERAPIST

## 2024-04-03 NOTE — LETTER
2024    JACQUELINE Braxton  [address] [address]    Patient: Ash Cooper  YOB: 1975   Date of Visit: 4/3/2024      Dear Dr. Dobson:    One of your patients, Ash Cooper, was referred to me by the [facility] program.  Please see the evaluation summary attached below.  If care is required beyond 30 days to help your patient reach their goals, I will send you a request for signature on the plan of care.    If you have any questions or concerns, please don't hesitate to call.      Sincerely,    Irina Farr, PT      Primary Care Provider:      I certify that I have read the below Plan of Care and certify the need for these services furnished under this plan of treatment while under my care.                    JACQUELINE Braxton  [address] [address]  Via Fax: [phone]           PT Evaluation     Today's date: 4/3/2024  Patient name: Ash Cooper  : 1975  MRN: [id number]  Referring provider: Celeste Dobson CRNP  Dx:   Encounter Diagnosis     ICD-10-CM    1. Acute midline low back pain with bilateral sciatica  M54.42 Ambulatory referral to PT spine    M54.41                      Assessment  Assessment details: Problem list:  1.  LBP/R LE pain with repeated and prolonged flexion indicating likely a posterior derangement   2.  Lumbar hypomobility lacking extension with segmental mobility restrictions at L5-S1     Ash Cooper is a 48-year-old male who presents via [facility] program with acute onset of low back pain radiating into the left buttocks posteriorly to the left knee.  He has lumbar hypomobility lacking extension with repeated flexion provoking his chief complaint symptoms and likely indicating posterior derangement.  He has no neurologic signs at this time and no dermatomal changes or myotomal weakness.  He responded well to repeated extension.  No referral appears necessary at this time as  he responded favorably to initial treatment.  Comparable sign:  1.  Left lower extremity radiating pain  2.  Repeated lumbar flexion  Impairments: abnormal or restricted ROM, activity intolerance, pain with function and poor body mechanics  Functional limitations: Sitting, bending, prolonged standingUnderstanding of Dx/Px/POC: good   Prognosis: good  Prognosis details: Positive prognostic indicators:  Patient is a willing participant, absence of observed red flags, positive sponsor repeated motions with centralization  Negative prognostic indicators:  None     Goals  Short-term goals:  1.  Independent home exercise program.  2.  Abolish left lower extremity pain and promote centralization.  3.  Improved seated posture with use of lumbar roll.  Long-term goals:  1.  Pain-free ADLs and IADLs.  2.  Patient will be able perform work-related activities without pain.  3.  Patient will be able to  walk unlimited distances without pain.    Plan  Patient would benefit from: skilled physical therapy  Planned therapy interventions: manual therapy, neuromuscular re-education, postural training, patient education, therapeutic activities, therapeutic exercise and graded exercise  Frequency: 2x week  Duration in visits: 8  Plan of Care beginning date: 4/3/2024  Plan of Care expiration date: 7/2/2024  Treatment plan discussed with: patient    Subjective Evaluation    History of Present Illness  Mechanism of injury: Pt notes insidious onset of LBP radiating in to the buttocks. It has progressively worsened over a couple of weeks. He reports paresthesias that radiate into the left LE to the knee with prolonged standing. He reports discomfort in the low back and the pain is greater in the low back than the leg. He also reports pain with prolonged bending and sitting and driving.   He went to ER 3/27 with pain worse in his leg and pain rated at 9/10. He was given a muscle relaxer and prednisone as well as Naproxen and morphine. He has  not taken medicine for the past 2 days and the pain worsened.   He did have a CT scan that revealed DDD and disc bulges at L2-3, L5-S1.  He gets pain relief by laying down and going to bed.   The pain generally worsens as the day progresses.     The patient's greatest concern is the pain has not gone away.   His goal is to alleviate pain so it doesn't worsen and be able to go to [place] and walk around.   Patient Goals  Patient goals for therapy: decreased pain and independence with ADLs/IADLs  Patient goal: be able to walk around [place]  Pain  Current pain ratin  At worst pain rating: 10  Location: low back, buttocks, left leg  Quality: radiating  Alleviating factors: laying down.  Aggravating factors: sitting and walking (bending)      Diagnostic Tests  CT scan: abnormal      Objective     Concurrent Complaints  Negative for night pain, disturbed sleep, bladder dysfunction, bowel dysfunction and saddle (S4) numbness    Additional Special Questions  No red flags    Neurological Testing     Sensation     Lumbar   Left   Intact: light touch and pin prick    Right   Intact: light touch and pin prick    Reflexes   Left   Patellar (L4): normal (2+)  Achilles (S1): normal (2+)    Right   Patellar (L4): normal (2+)  Achilles (S1): normal (2+)    Active Range of Motion     Lumbar   Flexion:  Restriction level: minimal  Extension:  Restriction level: minimal  Left lateral flexion:  Restriction level: minimal  Right lateral flexion:  Restriction level: minimal  Left rotation:  Restriction level: minimal  Right rotation:  Restriction level: minimal    Joint Play     Hypomobile: L4, L5 and S1     Pain: L4, L5 and S1   L5 comments: L UPA  S1 comments: L UPA  Mechanical Assessment    Cervical      Thoracic      Lumbar    Standing flexion: repeated movements   Pain location:peripheralized  Pain level: produced  Lying extension: repeated movements  Pain location: centralized  Pain intensity: better  Pain level:  abolished    Strength/Myotome Testing     Lumbar   Left   Heel walk: normal  Toe walk: normal    Right   Heel walk: normal  Toe walk: normal    Left Hip   Planes of Motion   Flexion: 5    Right Hip   Planes of Motion   Flexion: 5    Left Knee   Flexion: 5  Extension: 4 (prior knee surgery)    Right Knee   Flexion: 5  Extension: 5    Left Ankle/Foot   Dorsiflexion: 5  Plantar flexion: 5  Great toe extension: 5    Right Ankle/Foot   Dorsiflexion: 5  Plantar flexion: 5  Great toe extension: 5    Additional Strength Details  Pt able to heel and toe walk    Tests     Lumbar     Left   Negative slump test.     Right   Negative slump test.            POC expires Unit limit Auth Expiration date PT/OT + Visit Limit?    BOMN 6/28/24 45 (combo per year)  12                           Visit/Unit Tracking  AUTH Status:  Date 4/3              Auth needed  Used 1               Remaining  11               FOTO                     Precautions: HTN  Access Code: [email or web address]  URL: [email or web address]  Date: 04/03/2024  Prepared by: Irina Farr    Exercises  - Prone Press Up  - 1 x daily - 7 x weekly - 3 sets - 10 reps  - Prone Press Up On Elbows  - 1 x daily - 7 x weekly - 3 sets - 10 reps  - Standing Lumbar Extension  - 1 x daily - 7 x weekly - 3 sets - 10 reps  Discussed centralization, progression of force, green light. Pt demonstrated good understanding.   Manuals 4/3            PA mbo Left L5-S1, scaral rock [facility]                         TherEx             Pt ed/HEP 10'            Prone lying 3'            Prone on elbows 3'            Prone press up  3x10                                                                                          Ther Ex                                                                                                                     Ther Activity                                       Gait Training                                       Modalities

## 2024-04-03 NOTE — PROGRESS NOTES
PT Evaluation     Today's date: 4/3/2024  Patient name: Ash Cooper  : 1975  MRN: [id number]  Referring provider: Celeste Dobson CRNP  Dx:   Encounter Diagnosis     ICD-10-CM    1. Acute midline low back pain with bilateral sciatica  M54.42 Ambulatory referral to PT spine    M54.41                      Assessment  Assessment details: Problem list:  1.  LBP/R LE pain with repeated and prolonged flexion indicating likely a posterior derangement   2.  Lumbar hypomobility lacking extension with segmental mobility restrictions at L5-S1     Ash Cooper is a 48-year-old male who presents via [facility] program with acute onset of low back pain radiating into the left buttocks posteriorly to the left knee.  He has lumbar hypomobility lacking extension with repeated flexion provoking his chief complaint symptoms and likely indicating posterior derangement.  He has no neurologic signs at this time and no dermatomal changes or myotomal weakness.  He responded well to repeated extension.  No referral appears necessary at this time as he responded favorably to initial treatment.  Comparable sign:  1.  Left lower extremity radiating pain  2.  Repeated lumbar flexion  Impairments: abnormal or restricted ROM, activity intolerance, pain with function and poor body mechanics  Functional limitations: Sitting, bending, prolonged standingUnderstanding of Dx/Px/POC: good   Prognosis: good  Prognosis details: Positive prognostic indicators:  Patient is a willing participant, absence of observed red flags, positive sponsor repeated motions with centralization  Negative prognostic indicators:  None     Goals  Short-term goals:  1.  Independent home exercise program.  2.  Abolish left lower extremity pain and promote centralization.  3.  Improved seated posture with use of lumbar roll.  Long-term goals:  1.  Pain-free ADLs and IADLs.  2.  Patient will be able perform work-related activities without pain.  3.  Patient will be able  to  walk unlimited distances without pain.    Plan  Patient would benefit from: skilled physical therapy  Planned therapy interventions: manual therapy, neuromuscular re-education, postural training, patient education, therapeutic activities, therapeutic exercise and graded exercise  Frequency: 2x week  Duration in visits: 8  Plan of Care beginning date: 4/3/2024  Plan of Care expiration date: 2024  Treatment plan discussed with: patient    Subjective Evaluation    History of Present Illness  Mechanism of injury: Pt notes insidious onset of LBP radiating in to the buttocks. It has progressively worsened over a couple of weeks. He reports paresthesias that radiate into the left LE to the knee with prolonged standing. He reports discomfort in the low back and the pain is greater in the low back than the leg. He also reports pain with prolonged bending and sitting and driving.   He went to ER 3/27 with pain worse in his leg and pain rated at 9/10. He was given a muscle relaxer and prednisone as well as Naproxen and morphine. He has not taken medicine for the past 2 days and the pain worsened.   He did have a CT scan that revealed DDD and disc bulges at L2-3, L5-S1.  He gets pain relief by laying down and going to bed.   The pain generally worsens as the day progresses.     The patient's greatest concern is the pain has not gone away.   His goal is to alleviate pain so it doesn't worsen and be able to go to [place] and walk around.   Patient Goals  Patient goals for therapy: decreased pain and independence with ADLs/IADLs  Patient goal: be able to walk around [place]  Pain  Current pain ratin  At worst pain rating: 10  Location: low back, buttocks, left leg  Quality: radiating  Alleviating factors: laying down.  Aggravating factors: sitting and walking (bending)      Diagnostic Tests  CT scan: abnormal      Objective     Concurrent Complaints  Negative for night pain, disturbed sleep, bladder dysfunction, bowel  dysfunction and saddle (S4) numbness    Additional Special Questions  No red flags    Neurological Testing     Sensation     Lumbar   Left   Intact: light touch and pin prick    Right   Intact: light touch and pin prick    Reflexes   Left   Patellar (L4): normal (2+)  Achilles (S1): normal (2+)    Right   Patellar (L4): normal (2+)  Achilles (S1): normal (2+)    Active Range of Motion     Lumbar   Flexion:  Restriction level: minimal  Extension:  Restriction level: minimal  Left lateral flexion:  Restriction level: minimal  Right lateral flexion:  Restriction level: minimal  Left rotation:  Restriction level: minimal  Right rotation:  Restriction level: minimal    Joint Play     Hypomobile: L4, L5 and S1     Pain: L4, L5 and S1   L5 comments: L UPA  S1 comments: L UPA  Mechanical Assessment    Cervical      Thoracic      Lumbar    Standing flexion: repeated movements   Pain location:peripheralized  Pain level: produced  Lying extension: repeated movements  Pain location: centralized  Pain intensity: better  Pain level: abolished    Strength/Myotome Testing     Lumbar   Left   Heel walk: normal  Toe walk: normal    Right   Heel walk: normal  Toe walk: normal    Left Hip   Planes of Motion   Flexion: 5    Right Hip   Planes of Motion   Flexion: 5    Left Knee   Flexion: 5  Extension: 4 (prior knee surgery)    Right Knee   Flexion: 5  Extension: 5    Left Ankle/Foot   Dorsiflexion: 5  Plantar flexion: 5  Great toe extension: 5    Right Ankle/Foot   Dorsiflexion: 5  Plantar flexion: 5  Great toe extension: 5    Additional Strength Details  Pt able to heel and toe walk    Tests     Lumbar     Left   Negative slump test.     Right   Negative slump test.            POC expires Unit limit Auth Expiration date PT/OT + Visit Limit?    BOMN 6/28/24 45 (combo per year)  12                           Visit/Unit Tracking  AUTH Status:  Date 4/3              Auth needed  Used 1               Remaining  11               FOTO                      Precautions: HTN  Access Code: [email or web address]  URL: [email or web address]  Date: 04/03/2024  Prepared by: Irina Farr    Exercises  - Prone Press Up  - 1 x daily - 7 x weekly - 3 sets - 10 reps  - Prone Press Up On Elbows  - 1 x daily - 7 x weekly - 3 sets - 10 reps  - Standing Lumbar Extension  - 1 x daily - 7 x weekly - 3 sets - 10 reps  Discussed centralization, progression of force, green light. Pt demonstrated good understanding.   Manuals 4/3            PA mbo Left L5-S1, scaral rock MH                         TherEx             Pt ed/HEP 10'            Prone lying 3'            Prone on elbows 3'            Prone press up  3x10                                                                                          Ther Ex                                                                                                                     Ther Activity                                       Gait Training                                       Modalities

## 2024-04-08 ENCOUNTER — APPOINTMENT (OUTPATIENT)
Dept: PHYSICAL THERAPY | Age: 49
End: 2024-04-08
Payer: COMMERCIAL

## 2024-04-10 ENCOUNTER — OFFICE VISIT (OUTPATIENT)
Dept: PHYSICAL THERAPY | Age: 49
End: 2024-04-10
Payer: COMMERCIAL

## 2024-04-10 DIAGNOSIS — M54.41 ACUTE MIDLINE LOW BACK PAIN WITH BILATERAL SCIATICA: Primary | ICD-10-CM

## 2024-04-10 DIAGNOSIS — M54.42 ACUTE MIDLINE LOW BACK PAIN WITH BILATERAL SCIATICA: Primary | ICD-10-CM

## 2024-04-10 PROCEDURE — 97110 THERAPEUTIC EXERCISES: CPT | Performed by: PHYSICAL THERAPIST

## 2024-04-10 PROCEDURE — 97140 MANUAL THERAPY 1/> REGIONS: CPT | Performed by: PHYSICAL THERAPIST

## 2024-04-10 NOTE — PROGRESS NOTES
Daily Note     Today's date: 4/10/2024  Patient name: Ash Cooper  : 1975  MRN: [id number]  Referring provider: David Sousa DO  Dx:   Encounter Diagnosis     ICD-10-CM    1. Acute midline low back pain with bilateral sciatica  M54.42     M54.41           Start Time: 0700  Stop Time: 0740  Total time in clinic (min): 40 minutes    Subjective: Pt reports his numbness is gone in his leg, his LBP is mostly gone. He is compliant with HEP and reports MARCELO is helping.       Objective: See treatment diary below      Assessment: Improved extension ROM, symptoms appear centralized. Initiated some return to function activities.       Plan: Continue per plan of care.      POC expires Unit limit Auth Expiration date PT/OT + Visit Limit?    BOMN 24 45 (combo per year)  12                           Visit/Unit Tracking  AUTH Status:  Date 4/3 4/9             Auth needed  Used 1 2              Remaining  11 10              FOTO                     Precautions: HTN  Access Code: [email or web address]  URL: [email or web address]  Date: 2024  Prepared by: Irina Farr    Exercises  - Prone Press Up  - 1 x daily - 7 x weekly - 3 sets - 10 reps  - Prone Press Up On Elbows  - 1 x daily - 7 x weekly - 3 sets - 10 reps  - Standing Lumbar Extension  - 1 x daily - 7 x weekly - 3 sets - 10 reps  Discussed centralization, progression of force, green light. Pt demonstrated good understanding.   Manuals 4/3 4/10           PA mbo Left L5-S1, scaral rock  MH                        TherEx             Pt ed/HEP 10'            Prone lying 3'            Prone on elbows 3'            Prone press up  3x10            MARCELO  3x10  1x10 post           bridging  2x10           Standing hip   ext  2x10 ea           Tband row with ADIM  BTB 20x           Tband ext ADIM  BTB 20x           Paloff press  Blk 20x                                                                                                                                 Ther Activity                                       Gait Training                                       Modalities

## 2024-04-24 ENCOUNTER — OFFICE VISIT (OUTPATIENT)
Dept: PHYSICAL THERAPY | Age: 49
End: 2024-04-24
Payer: COMMERCIAL

## 2024-04-24 DIAGNOSIS — M54.41 ACUTE MIDLINE LOW BACK PAIN WITH BILATERAL SCIATICA: Primary | ICD-10-CM

## 2024-04-24 DIAGNOSIS — M54.42 ACUTE MIDLINE LOW BACK PAIN WITH BILATERAL SCIATICA: Primary | ICD-10-CM

## 2024-04-24 PROCEDURE — 97140 MANUAL THERAPY 1/> REGIONS: CPT | Performed by: PHYSICAL THERAPIST

## 2024-04-24 PROCEDURE — 97110 THERAPEUTIC EXERCISES: CPT | Performed by: PHYSICAL THERAPIST

## 2024-04-24 NOTE — PROGRESS NOTES
Daily Note/Discharge     Today's date: 2024  Patient name: Ash Cooper  : 1975  MRN: [id number]  Referring provider: David Sousa DO  Dx:   Encounter Diagnosis     ICD-10-CM    1. Acute midline low back pain with bilateral sciatica  M54.42     M54.41                      Subjective: Pt is pain free for > 1 week, no burning in buttocks, no back pain.       Objective: See treatment diary below      Assessment: Pt is pain free, performing work activities without limitation, was bale to drive in car when traveling to [place] and walk around [employer]. Pt is able to manage symptoms independently and has returned to prior level of function.   Goals  Short-term goals:  1.  Independent home exercise program. MET  2.  Abolish left lower extremity pain and promote centralization. MET  3.  Improved seated posture with use of lumbar roll. MET  Long-term goals:  1.  Pain-free ADLs and IADLs. MET  2.  Patient will be able perform work-related activities without pain. MET  3.  Patient will be able to  walk unlimited distances without pain. MET    Plan: D/C PT at this time with all goals achieved.      POC expires Unit limit Auth Expiration date PT/OT + Visit Limit?    BOMN 24 45 (combo per year)  12                           Visit/Unit Tracking  AUTH Status:  Date 4/3 4/10 4/24            Auth needed  Used 1 2 3             Remaining  11 10 9             FOTO                     Precautions: HTN  Access Code: [email or web address]  URL: [email or web address]  Date: 2024  Prepared by: Irina Farr    Exercises  - Prone Press Up  - 1 x daily - 7 x weekly - 3 sets - 10 reps  - Prone Press Up On Elbows  - 1 x daily - 7 x weekly - 3 sets - 10 reps  - Standing Lumbar Extension  - 1 x daily - 7 x weekly - 3 sets - 10 reps  Discussed centralization, progression of force, green light. Pt demonstrated good understanding.   Manuals 4/3 4/10 4/24          PA mbo Left L5-S1, scaral rock [facility]                        TherEx             Pt ed/HEP 10'            Prone lying 3'            Prone on elbows 3'            Prone press up  3x10            MARCELO  3x10  1x10 post 3x10          Prone glute set   30x          Prone hip ext   2x10          bridging  2x10 20x          Standing hip   ext  2x10 ea 20x          Tband row with ADIM  BTB 20x 30x blk          Tband ext ADIM  BTB 20x 30x blk          Paloff press  Blk 20x 20x ea                                                                                                                               Ther Activity                                       Gait Training                                       Modalities

## 2024-04-26 ENCOUNTER — APPOINTMENT (OUTPATIENT)
Dept: PHYSICAL THERAPY | Age: 49
End: 2024-04-26
Payer: COMMERCIAL

## 2024-05-13 ENCOUNTER — HOSPITAL ENCOUNTER (EMERGENCY)
Facility: HOSPITAL | Age: 49
Discharge: HOME | End: 2024-05-13
Attending: EMERGENCY MEDICINE | Admitting: EMERGENCY MEDICINE
Payer: COMMERCIAL

## 2024-05-13 ENCOUNTER — APPOINTMENT (EMERGENCY)
Dept: RADIOLOGY | Facility: HOSPITAL | Age: 49
End: 2024-05-13
Payer: COMMERCIAL

## 2024-05-13 VITALS
OXYGEN SATURATION: 97 % | DIASTOLIC BLOOD PRESSURE: 105 MMHG | RESPIRATION RATE: 18 BRPM | SYSTOLIC BLOOD PRESSURE: 170 MMHG | TEMPERATURE: 97.8 F | HEART RATE: 95 BPM

## 2024-05-13 DIAGNOSIS — S61.012A LACERATION OF LEFT THUMB: Primary | ICD-10-CM

## 2024-05-13 PROCEDURE — 90471 IMMUNIZATION ADMIN: CPT

## 2024-05-13 PROCEDURE — 90715 TDAP VACCINE 7 YRS/> IM: CPT | Performed by: EMERGENCY MEDICINE

## 2024-05-13 PROCEDURE — 99283 EMERGENCY DEPT VISIT LOW MDM: CPT

## 2024-05-13 PROCEDURE — 73140 X-RAY EXAM OF FINGER(S): CPT

## 2024-05-13 PROCEDURE — 99284 EMERGENCY DEPT VISIT MOD MDM: CPT | Performed by: EMERGENCY MEDICINE

## 2024-05-13 PROCEDURE — 12002 RPR S/N/AX/GEN/TRNK2.6-7.5CM: CPT | Performed by: EMERGENCY MEDICINE

## 2024-05-13 RX ORDER — OXYCODONE HYDROCHLORIDE AND ACETAMINOPHEN 5; 325 MG/1; MG/1
1 TABLET ORAL EVERY 6 HOURS PRN
Qty: 15 TABLET | Refills: 0 | Status: SHIPPED | OUTPATIENT
Start: 2024-05-13

## 2024-05-13 RX ORDER — AMOXICILLIN AND CLAVULANATE POTASSIUM 875; 125 MG/1; MG/1
1 TABLET, FILM COATED ORAL ONCE
Status: COMPLETED | OUTPATIENT
Start: 2024-05-13 | End: 2024-05-13

## 2024-05-13 RX ORDER — CEPHALEXIN 500 MG/1
500 CAPSULE ORAL EVERY 6 HOURS SCHEDULED
Qty: 28 CAPSULE | Refills: 0 | Status: SHIPPED | OUTPATIENT
Start: 2024-05-13 | End: 2024-05-20

## 2024-05-13 RX ADMIN — Medication 1 APPLICATION: at 17:07

## 2024-05-13 RX ADMIN — TETANUS TOXOID, REDUCED DIPHTHERIA TOXOID AND ACELLULAR PERTUSSIS VACCINE, ADSORBED 0.5 ML: 5; 2.5; 8; 8; 2.5 SUSPENSION INTRAMUSCULAR at 17:07

## 2024-05-13 RX ADMIN — AMOXICILLIN AND CLAVULANATE POTASSIUM 1 TABLET: 875; 125 TABLET, FILM COATED ORAL at 17:07

## 2024-05-13 NOTE — ED PROVIDER NOTES
History  Chief Complaint   Patient presents with    Thumb Laceration     Left thumb laceration from table saw injury today.      47 yo male RHD tetanus status uncertain who presents with thumb lac from table saw. No motor deficit. Pain resolved. Bleeding resolved. Nkda.         Prior to Admission Medications   Prescriptions Last Dose Informant Patient Reported? Taking?   aspirin (ECOTRIN LOW STRENGTH) 81 mg EC tablet  Self No No   Sig: Take 1 tablet (81 mg total) by mouth 2 (two) times a day   Patient not taking: Reported on 4/27/2023   ibuprofen (MOTRIN) 400 mg tablet  Self No No   Sig: Take 1 tablet (400 mg total) by mouth every 6 (six) hours as needed for mild pain   Patient not taking: Reported on 4/27/2023   ibuprofen (MOTRIN) 800 mg tablet  Self No No   Sig: Take 1 tablet (800 mg total) by mouth every 8 (eight) hours as needed for mild pain Take 1 tablet by mouth 3 times daily for 3 days, then take as needed for pain relief. Take with food and water. Discontinue if stomach upset occurs.   Patient not taking: Reported on 4/27/2023   levothyroxine 25 mcg tablet  Self Yes No   Patient not taking: Reported on 3/20/2023   lidocaine (XYLOCAINE) 5 % ointment  Self No No   Sig: Apply topically as needed for mild pain   Patient not taking: Reported on 3/20/2023   methocarbamol (ROBAXIN) 500 mg tablet   No No   Sig: Take 1 tablet (500 mg total) by mouth 2 (two) times a day   naproxen (Naprosyn) 500 mg tablet   No No   Sig: Take 1 tablet (500 mg total) by mouth 2 (two) times a day with meals   omeprazole (PriLOSEC) 20 mg delayed release capsule   No No   Sig: TAKE 1 CAPSULE BY MOUTH EVERY DAY IN THE MORNING   omeprazole (PriLOSEC) 40 MG capsule  Self Yes No   Sig: Take 40 mg by mouth daily   ondansetron (ZOFRAN) 4 mg tablet  Self No No   Sig: Take 1 tablet (4 mg total) by mouth every 8 (eight) hours as needed for nausea or vomiting   ondansetron (Zofran ODT) 4 mg disintegrating tablet  Self No No   Sig: Take 1 tablet (4  mg total) by mouth every 6 (six) hours as needed for nausea or vomiting   oxyCODONE-acetaminophen (Percocet) 5-325 mg per tablet  Self No No   Sig: Take 1 tablet by mouth every 4 (four) hours as needed for moderate pain Max Daily Amount: 6 tablets   Patient not taking: Reported on 3/20/2023   phentermine 15 MG capsule  Self Yes No   Sig: Take 15 mg by mouth every morning   predniSONE 20 mg tablet   No No   Sig: Take 2 tablets (40 mg total) by mouth daily      Facility-Administered Medications: None       Past Medical History:   Diagnosis Date    Disease of thyroid gland     GERD (gastroesophageal reflux disease)     Hypertension     Rotator cuff tear        Past Surgical History:   Procedure Laterality Date    KNEE SURGERY Bilateral     torn ligament     MENISCECTOMY Left 3/10/2023    Procedure: ARTHROSCOPY KNEE- Left Knee partial medial menisectomy, Chondroplasty, medial femoral condyle;  Surgeon: Nigel Sky DO;  Location: [facility];  Service: Orthopedics    [place] ARTH[place] KNE SURG W/MENISCECTOMY MED/LAT W/SHVG Right 2022    Procedure: ARTHROSCOPY KNEE- Right Knee partial medial menisectomy;  Surgeon: Nigel Sky DO;  Location: [facility];  Service: Orthopedics    VASECTOMY         Family History   Problem Relation Age of Onset    No Known Problems Mother     SELINA disease Father      I have reviewed and agree with the history as documented.    E-Cigarette/Vaping    E-Cigarette Use Never User      E-Cigarette/Vaping Substances    Nicotine No     THC No     CBD No     Flavoring No     Other No     Unknown No      Social History     Tobacco Use    Smoking status: Former     Current packs/day: 0.00     Average packs/day: 1 pack/day for 26.0 years (26.0 ttl pk-yrs)     Types: Cigarettes     Start date:      Quit date: 2012     Years since quittin.3    Smokeless tobacco: Never   Vaping Use    Vaping status: Never Used   Substance Use Topics    Alcohol use: Yes     Comment: 3-4 x year    Drug use: No        Review of Systems   Skin:  Positive for wound.       Physical Exam  Physical Exam  Vitals and nursing note reviewed.   Constitutional:       General: He is not in acute distress.     Appearance: Normal appearance. He is well-developed. He is not ill-appearing, toxic-appearing or diaphoretic.   HENT:      Head: Normocephalic and atraumatic.      Mouth/Throat:      Mouth: Mucous membranes are moist.      Pharynx: Oropharynx is clear.   Eyes:      General:         Right eye: No discharge.         Left eye: No discharge.      Conjunctiva/sclera: Conjunctivae normal.      Pupils: Pupils are equal, round, and reactive to light.   Neck:      Vascular: No JVD.   Cardiovascular:      Pulses: Normal pulses.   Pulmonary:      Effort: Pulmonary effort is normal. No respiratory distress.      Breath sounds: No stridor.   Musculoskeletal:         General: Swelling and signs of injury present. No deformity. Normal range of motion.      Cervical back: Normal range of motion and neck supple. No rigidity.      Comments: Complex jagged laceration L thumb with tissue loss. Normal motor function and perfusion. Decreased sensation to light touch from IP joint distally. Normal distal perfusion.    Skin:     General: Skin is warm and dry.      Capillary Refill: Capillary refill takes less than 2 seconds.      Coloration: Skin is not pale.      Findings: No erythema or rash.   Neurological:      General: No focal deficit present.      Mental Status: He is alert and oriented to person, place, and time.      Cranial Nerves: No cranial nerve deficit.      Sensory: No sensory deficit.      Motor: No weakness or abnormal muscle tone.      Coordination: Coordination normal.      Gait: Gait normal.         Vital Signs  ED Triage Vitals [05/13/24 1340]   Temperature Pulse Respirations Blood Pressure SpO2   97.8 °F (36.6 °C) 95 18 (!) 170/105 97 %      Temp Source Heart Rate Source Patient Position - Orthostatic VS BP Location FiO2 (%)    Temporal Monitor Sitting Left arm --      Pain Score       --           Vitals:    05/13/24 1340   BP: (!) 170/105   Pulse: 95   Patient Position - Orthostatic VS: Sitting         Visual Acuity      ED Medications  Medications   tetanus-diphtheria-acellular pertussis (BOOSTRIX) IM injection 0.5 mL (0.5 mL Intramuscular Given 5/13/24 1707)   amoxicillin-clavulanate (AUGMENTIN) 875-125 mg per tablet 1 tablet (1 tablet Oral Given 5/13/24 1707)   LET gel 1 Application (1 Application Topical Given 5/13/24 1707)       Diagnostic Studies  Results Reviewed       None                   XR thumb first digit-min 2 views LEFT   ED Interpretation by Kirk Farris MD (05/13 1733)   No fracture or foreign body.                  Procedures  Universal Protocol:  Consent: Verbal consent obtained.  Risks and benefits: risks, benefits and alternatives were discussed  Consent given by: patient  Laceration repair    Date/Time: 5/13/2024 6:17 PM    Performed by: Kirk Farris MD  Authorized by: Kirk Farris MD  Body area: upper extremity  Location details: left thumb  Laceration length: 4 cm  Foreign bodies: no foreign bodies  Tendon involvement: none  Nerve involvement: none    Anesthesia:  Local Anesthetic: LET (lido, epi, tetracaine)    Wound Dehiscence:  Superficial Wound Dehiscence: simple closure      Procedure Details:  Preparation: Patient was prepped and draped in the usual sterile fashion.  Irrigation solution: tap water  Irrigation method: syringe  Amount of cleaning: standard  Debridement: minimal  Skin closure: 4-0 nylon  Number of sutures: 2  Technique: horizontal mattress  Approximation: close  Approximation difficulty: complex  Patient tolerance: patient tolerated the procedure well with no immediate complications  Cleaning details: other particulate matter             ED Course                                             Medical Decision Making  Problems Addressed:  Laceration of left thumb: complicated acute  illness or injury     Details: At risk for infection, vascular injury, tendon injury, non-healing due to tissue destruction.     Amount and/or Complexity of Data Reviewed  Radiology: ordered and independent interpretation performed.    Risk  Prescription drug management.             Disposition  Final diagnoses:   Laceration of left thumb     Time reflects when diagnosis was documented in both MDM as applicable and the Disposition within this note       Time User Action Codes Description Comment    5/13/2024  5:52 PM Kikr Farris Add [S61.012A] Laceration of left thumb           ED Disposition       ED Disposition   Discharge    Condition   Stable    Date/Time   Mon May 13, 2024  5:52 PM    Comment   Ash Cooper discharge to home/self care.                   Follow-up Information       Follow up With Specialties Details Why Contact Info Additional Information    [facility] Emergency Medicine In 1 week to have your stitches taken out [address] [facility] [address]  [phone] [facility], [address], [place], [address]            Patient's Medications   Discharge Prescriptions    CEPHALEXIN (KEFLEX) 500 MG CAPSULE    Take 1 capsule (500 mg total) by mouth every 6 (six) hours for 7 days       Start Date: 5/13/2024 End Date: 5/20/2024       Order Dose: 500 mg       Quantity: 28 capsule    Refills: 0    OXYCODONE-ACETAMINOPHEN (PERCOCET) 5-325 MG PER TABLET    Take 1 tablet by mouth every 6 (six) hours as needed for moderate pain for up to 15 doses Max Daily Amount: 4 tablets       Start Date: 5/13/2024 End Date: --       Order Dose: 1 tablet       Quantity: 15 tablet    Refills: 0           PDMP Review       None            ED Provider  Electronically Signed by        I have reasonably determined that electronically prescribing a controlled substance would be impractical for the patient to obtain  the controlled substance prescribed by electronic prescription or would cause an untimely delay resulting in an adverse impact on the patient's medical condition.       Kirk Farris MD  05/13/24 3236

## 2024-05-28 ENCOUNTER — OFFICE VISIT (OUTPATIENT)
Dept: OBGYN CLINIC | Facility: CLINIC | Age: 49
End: 2024-05-28
Payer: COMMERCIAL

## 2024-05-28 VITALS — HEIGHT: 72 IN | WEIGHT: 259 LBS | BODY MASS INDEX: 35.08 KG/M2

## 2024-05-28 DIAGNOSIS — S61.012A LACERATION OF LEFT THUMB: ICD-10-CM

## 2024-05-28 PROCEDURE — 99213 OFFICE O/P EST LOW 20 MIN: CPT | Performed by: STUDENT IN AN ORGANIZED HEALTH CARE EDUCATION/TRAINING PROGRAM

## 2024-05-28 NOTE — PROGRESS NOTES
ORTHOPAEDIC HAND, WRIST, AND ELBOW OFFICE  VISIT      ASSESSMENT/PLAN:      Diagnoses and all orders for this visit:    Laceration of left thumb  -     Ambulatory Referral to Orthopedic Surgery          48 y.o. male with left thumb laceration, DOI 5/13/24    On exam, the laceration is healing well. There is no erythema or signs of infection. EPL and FPL are intact. He may wash with soap and water. Discussed he likely cut a nerve however, at this level no surgical intervention is warranted. X-rays were reviewed which demonstrate no fractures or dislocations. He will follow up in 2 weeks for repeat evaluation.      Follow Up:  2 weeks       To Do Next Visit:  Re-evaluation of current issue            Jose Capps MD  Attending, Orthopaedic Surgery  Hand, Wrist, and Elbow Surgery  [facility]    ______________________________________________________________________________________________    CHIEF COMPLAINT:  Chief Complaint   Patient presents with   • Left Thumb - Laceration       SUBJECTIVE:  Patient is a 48 y.o. RHD male who presents today for evaluation and treatment of left thumb laceration. The patient states on 5/13/24 he cut the volar aspect of his thumb on a table saw. He presented to the ED the day of injury where x-rays were taken. Sutures were placed and he was discharged on Keflex. The patient did remove the sutures 10 days after the injury.     Occupation: owns a bathroom business      I have personally reviewed all the relevant PMH, PSH, SH, FH, Medications and allergies      PAST MEDICAL HISTORY:  Past Medical History:   Diagnosis Date   • Disease of thyroid gland    • GERD (gastroesophageal reflux disease)    • Hypertension    • Rotator cuff tear        PAST SURGICAL HISTORY:  Past Surgical History:   Procedure Laterality Date   • KNEE SURGERY Bilateral     torn ligament    • MENISCECTOMY Left 3/10/2023    Procedure: ARTHROSCOPY KNEE- Left Knee partial medial menisectomy,  Chondroplasty, medial femoral condyle;  Surgeon: Nigel Sky DO;  Location: [facility];  Service: Orthopedics   • [place] ARTH[place] KNE SURG W/MENISCECTOMY MED/LAT W/SHVG Right 2022    Procedure: ARTHROSCOPY KNEE- Right Knee partial medial menisectomy;  Surgeon: Nigel Sky DO;  Location: [facility];  Service: Orthopedics   • VASECTOMY         FAMILY HISTORY:  Family History   Problem Relation Age of Onset   • No Known Problems Mother    • SELINA disease Father        SOCIAL HISTORY:  Social History     Tobacco Use   • Smoking status: Former     Current packs/day: 0.00     Average packs/day: 1 pack/day for 26.0 years (26.0 ttl pk-yrs)     Types: Cigarettes     Start date:      Quit date:      Years since quittin.4   • Smokeless tobacco: Never   Vaping Use   • Vaping status: Never Used   Substance Use Topics   • Alcohol use: Yes     Comment: 3-4 x year   • Drug use: No       MEDICATIONS:    Current Outpatient Medications:   •  omeprazole (PriLOSEC) 20 mg delayed release capsule, TAKE 1 CAPSULE BY MOUTH EVERY DAY IN THE MORNING, Disp: 90 capsule, Rfl: 1  •  omeprazole (PriLOSEC) 40 MG capsule, Take 40 mg by mouth daily (Patient not taking: Reported on 2024), Disp: , Rfl:     ALLERGIES:  No Known Allergies        REVIEW OF SYSTEMS:  Musculoskeletal:        As noted in HPI.   All other systems reviewed and are negative.    VITALS:  There were no vitals filed for this visit.    LABS:  HgA1c:   Lab Results   Component Value Date    HGBA1C 5.9 (H) 02/15/2023     BMP:   Lab Results   Component Value Date    CALCIUM 9.0 2023    K 4.2 2023    CO2 27 2023     2023    BUN 17 2023    CREATININE 0.98 2023       _____________________________________________________  PHYSICAL EXAMINATION:  General: Well developed and well nourished, alert & oriented x 3, appears comfortable  Psychiatric: Normal  HEENT: Normocephalic, Atraumatic Trachea Midline, No  torticollis  Pulmonary: No audible wheezing or respiratory distress   Abdomen/GI: Non tender, non distended   Cardiovascular: No pitting edema, 2+ radial pulse   Skin: No Masses, No Fluctuation, No Ulcerations  Neurovascular: Sensation Intact to the Median, Ulnar, Radial Nerve, Motor Intact to the Median, Ulnar, Radial Nerve, and Pulses Intact  Musculoskeletal: Normal, except as noted in detailed exam and in HPI.      MUSCULOSKELETAL EXAMINATION:  Left thumb  6 mm L by 16 mm wide laceration over volar thumb tip  FPL intact  EPL intact  2 point discrimination 5 mm ulnar aspect and out at 15 over radial aspect   ___________________________________________________  STUDIES REVIEWED:  Xrays of the left thumb were reviewed and independently interpreted in PACS by Dr. Capps and demonstrate no fractures or dislocations.           PROCEDURES PERFORMED:  Procedures  No Procedures performed today    _____________________________________________________      Scribe Attestation    I,:  Mariola Rowley MA am acting as a scribe while in the presence of the attending physician.:       I,:  Jose Capps MD personally performed the services described in this documentation    as scribed in my presence.:

## 2024-06-02 DIAGNOSIS — A04.8 H. PYLORI INFECTION: ICD-10-CM

## 2024-06-03 RX ORDER — OMEPRAZOLE 20 MG/1
CAPSULE, DELAYED RELEASE ORAL
Qty: 90 CAPSULE | Refills: 1 | Status: SHIPPED | OUTPATIENT
Start: 2024-06-03

## 2024-11-19 ENCOUNTER — NURSE TRIAGE (OUTPATIENT)
Age: 49
End: 2024-11-19

## 2024-11-19 ENCOUNTER — TELEPHONE (OUTPATIENT)
Age: 49
End: 2024-11-19

## 2024-11-19 NOTE — TELEPHONE ENCOUNTER
Patient calling with rectal bleeding every day to every other day since drove back from [place] on 10/25.  Sometimes dark red, burgundy wine color and other times just red (pictures received)  Denies pain with the exception of pain under right rib cage after overeating 3-4/10.  Painful with palpation to area 1/10 starting about 3 months ago.  Patient go back and forth between constipation and diarrhea dependent on what patient eats.  Denies nausea and vomiting.  Denies pain or pressure at rectum.  Does have intermittent itchiness. Pictures in chart.  Advised patient to start with fiber supplement, increased water intake and Prep H.  If not better in a week to call back.  If increased bleeding, color change in blood, abdominal pain, weakness, or shortness of breath to go ED.   Reason for Disposition   Affirmative: The patient has constipation with BRBPR (bright red blood per rectum)    Answer Assessment - Initial Assessment Questions  1. When did your symptoms start?   10/25  2. Is there bright red blood when wiping or streaks in the stool? If yes, how many occurrences have you had in the last 24 hours?  Red blood streaks and in toilet  3. Do you feel this is a mild, moderate, or severe amount of blood?   mild  4. Have your symptoms improved or weakened?   same  5. Are you experiencing more diarrhea or constipation?   Goes back and forth between both.    6. Do you have anything prescribed or over the counter for this? XXX If so, did they offer any relief?   no  7. Are you experiencing any additional symptoms? If yes, please describe what your symptoms are.   denies    Protocols used: GI-Hemorrhoids-ADULT-[place]

## 2024-11-19 NOTE — TELEPHONE ENCOUNTER
Pt called stated since 10-25 he's been having off and on blood in stool. Warm transferred over to triage nurse for further assistance

## 2025-05-09 ENCOUNTER — APPOINTMENT (EMERGENCY)
Dept: RADIOLOGY | Facility: HOSPITAL | Age: 50
End: 2025-05-09
Payer: COMMERCIAL

## 2025-05-09 ENCOUNTER — HOSPITAL ENCOUNTER (EMERGENCY)
Facility: HOSPITAL | Age: 50
Discharge: HOME | End: 2025-05-09
Attending: EMERGENCY MEDICINE
Payer: COMMERCIAL

## 2025-05-09 ENCOUNTER — APPOINTMENT (EMERGENCY)
Dept: CT IMAGING | Facility: HOSPITAL | Age: 50
End: 2025-05-09
Payer: COMMERCIAL

## 2025-05-09 VITALS
DIASTOLIC BLOOD PRESSURE: 84 MMHG | TEMPERATURE: 98.9 F | HEART RATE: 72 BPM | SYSTOLIC BLOOD PRESSURE: 133 MMHG | OXYGEN SATURATION: 98 % | RESPIRATION RATE: 16 BRPM

## 2025-05-09 DIAGNOSIS — M54.12 RIGHT CERVICAL RADICULOPATHY: ICD-10-CM

## 2025-05-09 DIAGNOSIS — M25.511 RIGHT SHOULDER PAIN: Primary | ICD-10-CM

## 2025-05-09 DIAGNOSIS — M54.2 NECK PAIN ON RIGHT SIDE: ICD-10-CM

## 2025-05-09 DIAGNOSIS — S46.811A STRAIN OF TRAPEZIUS MUSCLE, RIGHT, INITIAL ENCOUNTER: ICD-10-CM

## 2025-05-09 PROCEDURE — 99284 EMERGENCY DEPT VISIT MOD MDM: CPT | Performed by: EMERGENCY MEDICINE

## 2025-05-09 PROCEDURE — 73030 X-RAY EXAM OF SHOULDER: CPT

## 2025-05-09 PROCEDURE — 99283 EMERGENCY DEPT VISIT LOW MDM: CPT

## 2025-05-09 PROCEDURE — 96372 THER/PROPH/DIAG INJ SC/IM: CPT

## 2025-05-09 PROCEDURE — 72125 CT NECK SPINE W/O DYE: CPT

## 2025-05-09 RX ORDER — LIDOCAINE 50 MG/G
1 PATCH TOPICAL ONCE
Status: DISCONTINUED | OUTPATIENT
Start: 2025-05-09 | End: 2025-05-09 | Stop reason: HOSPADM

## 2025-05-09 RX ORDER — KETOROLAC TROMETHAMINE 30 MG/ML
30 INJECTION, SOLUTION INTRAMUSCULAR; INTRAVENOUS ONCE
Status: COMPLETED | OUTPATIENT
Start: 2025-05-09 | End: 2025-05-09

## 2025-05-09 RX ORDER — METHYLPREDNISOLONE 4 MG/1
TABLET ORAL
Qty: 21 TABLET | Refills: 0 | Status: SHIPPED | OUTPATIENT
Start: 2025-05-09

## 2025-05-09 RX ORDER — ACETAMINOPHEN 325 MG/1
650 TABLET ORAL ONCE
Status: COMPLETED | OUTPATIENT
Start: 2025-05-09 | End: 2025-05-09

## 2025-05-09 RX ORDER — CYCLOBENZAPRINE HCL 10 MG
10 TABLET ORAL 2 TIMES DAILY PRN
Qty: 10 TABLET | Refills: 0 | Status: SHIPPED | OUTPATIENT
Start: 2025-05-09

## 2025-05-09 RX ORDER — CELECOXIB 200 MG/1
CAPSULE ORAL
Qty: 30 CAPSULE | Refills: 0 | Status: SHIPPED | OUTPATIENT
Start: 2025-05-09

## 2025-05-09 RX ADMIN — LIDOCAINE 1 PATCH: 700 PATCH TOPICAL at 14:25

## 2025-05-09 RX ADMIN — KETOROLAC TROMETHAMINE 30 MG: 30 INJECTION, SOLUTION INTRAMUSCULAR at 14:28

## 2025-05-09 RX ADMIN — ACETAMINOPHEN 650 MG: 325 TABLET ORAL at 14:26

## 2025-05-09 NOTE — ED PROVIDER NOTES
Time reflects when diagnosis was documented in both MDM as applicable and the Disposition within this note       Time User Action Codes Description Comment    5/9/2025  2:55 PM Kary Goldstein Add [M25.511] Right shoulder pain     5/9/2025  2:55 PM Kary Goldstein Add [M54.2] Neck pain on right side     5/9/2025  2:55 PM Kary Goldstein Add [S46.811A] Strain of trapezius muscle, right, initial encounter     5/9/2025  2:55 PM Kary Goldstein Add [M54.12] Right cervical radiculopathy           ED Disposition       ED Disposition   Discharge    Condition   Stable    Date/Time   Fri May 9, 2025  2:55 PM    Comment   Ash Cooper discharge to home/self care.                   Assessment & Plan       Medical Decision Making  49-year-old male presents to the ED with 4 days of right sided posterior neck, trapezius muscle pain.  He also reports radiation into the right shoulder.  He reports a tingling sensation in the musculature.  Suspect muscle strain and spasm causing mild nerve impingement.  Other considerations include cervical disc herniation, cervical spine degenerative disease causing peripheral nerve impingement.  Will obtain x-ray of the right shoulder and CT of the cervical spine.  Will treat symptomatically with IM Toradol, Tylenol, lidocaine patch.  Patient follows with [facility] orthopedic surgeon, Dr. Sky and encouraged him to call for follow-up appointment.    Amount and/or Complexity of Data Reviewed  Radiology: ordered and independent interpretation performed. Decision-making details documented in ED Course.    Risk  OTC drugs.  Prescription drug management.        ED Course as of 05/09/25 4418   Fri May 09, 2025   1451 Updated patient about essentially normal CT and x-ray.  Plan will be to prescribe Medrol Dosepak, Celebrex and Flexeril.  Advised close follow-up with orthopedic surgery.  Discussed using over-the-counter Salonpas patches as well as well as addition of Tylenol.  Discussed ED return  parameters.       Medications   lidocaine (LIDODERM) 5 % patch 1 patch (1 patch Topical Medication Applied 5/9/25 1425)   ketorolac (TORADOL) injection 30 mg (30 mg Intramuscular Given 5/9/25 1428)   acetaminophen (TYLENOL) tablet 650 mg (650 mg Oral Given 5/9/25 1426)       ED Risk Strat Scores                    No data recorded        SBIRT 20yo+      Flowsheet Row Most Recent Value   Initial Alcohol Screen: US AUDIT-C     1. How often do you have a drink containing alcohol? 0 Filed at: 05/09/2025 1317   2. How many drinks containing alcohol do you have on a typical day you are drinking?  0 Filed at: 05/09/2025 1317   3a. Male UNDER 65: How often do you have five or more drinks on one occasion? 0 Filed at: 05/09/2025 1317   3b. FEMALE Any Age, or MALE 65+: How often do you have 4 or more drinks on one occassion? 0 Filed at: 05/09/2025 1317   Audit-C Score 0 Filed at: 05/09/2025 1317   GREER: How many times in the past year have you...    Used an illegal drug or used a prescription medication for non-medical reasons? Never Filed at: 05/09/2025 1317                            History of Present Illness       Chief Complaint   Patient presents with    Shoulder Pain     3 weeks ago right shoulder was stiff and went away after a couple days. Now the middle of neck down to the shoulder is painful. Pt has hx of tear in right shoulder, feels like pins and needles. Pain 7/10. Pt denies injury to affected area. Pt denies cardiac hx       Past Medical History:   Diagnosis Date    Disease of thyroid gland     GERD (gastroesophageal reflux disease)     Hypertension     Rotator cuff tear       Past Surgical History:   Procedure Laterality Date    KNEE SURGERY Bilateral     torn ligament     MENISCECTOMY Left 3/10/2023    Procedure: ARTHROSCOPY KNEE- Left Knee partial medial menisectomy, Chondroplasty, medial femoral condyle;  Surgeon: Nigel Sky DO;  Location: [facility];  Service: Orthopedics    KS ARTHRS KNE SURG  "W/MENISCECTOMY MED/LAT W/SHVG Right 2022    Procedure: ARTHROSCOPY KNEE- Right Knee partial medial menisectomy;  Surgeon: Nigel Sky DO;  Location: [facility];  Service: Orthopedics    VASECTOMY        Family History   Problem Relation Age of Onset    No Known Problems Mother     SELINA disease Father       Social History     Tobacco Use    Smoking status: Former     Current packs/day: 0.00     Average packs/day: 1 pack/day for 26.0 years (26.0 ttl pk-yrs)     Types: Cigarettes     Start date:      Quit date:      Years since quittin.3    Smokeless tobacco: Never   Vaping Use    Vaping status: Never Used   Substance Use Topics    Alcohol use: Yes     Comment: 3-4 x year    Drug use: No      E-Cigarette/Vaping    E-Cigarette Use Never User       E-Cigarette/Vaping Substances    Nicotine No     THC No     CBD No     Flavoring No     Other No     Unknown No       I have reviewed and agree with the history as documented.     Patient is a 49-year-old male with no significant past medical history, prior right shoulder ligamentous tear several years ago, presents to the emergency department complaining of right shoulder and right sided neck pain that started on Tuesday.  Patient states that a few weeks ago he did feel a pain in the right posterior shoulder/neck region however it lasted only a couple of days and then went away.  He reports that Tuesday he woke up with pain that is now more in the back of his neck on the right side that radiates into his trapezius and right shoulder region.  Denies any radiation of pain past the shoulder joint.  He states when he does lift his shoulder, it worsens his pain. He reports he feels a \"tingling\" sensation over the right posterior neck and shoulder region.  Denies any new or recent neck or shoulder injury. Denies any associated symptoms such as fever, chills, headaches, dizziness, lightheadedness, cough, URI symptoms, neck stiffness, chest pain, palpitations, " dyspnea, abdominal pain, n/v/d/c, urinary symptoms, skin rash/color change, extremity weakness or paresthesia or other focal neurologic deficits. He reports using heat as well as cold packs and a left-over muscle relaxer that he was previously prescribed but he reports it did not help.       History provided by:  Patient   used: No    Shoulder Pain  Associated symptoms: neck pain    Associated symptoms: no back pain and no fever        Review of Systems   Constitutional:  Negative for chills and fever.   HENT:  Negative for congestion, ear pain, rhinorrhea and sore throat.    Respiratory:  Negative for cough, chest tightness, shortness of breath and wheezing.    Cardiovascular:  Negative for chest pain and palpitations.   Gastrointestinal:  Negative for abdominal pain, constipation, diarrhea, nausea and vomiting.   Genitourinary:  Negative for dysuria, flank pain, frequency and hematuria.   Musculoskeletal:  Positive for neck pain. Negative for back pain and neck stiffness.        +Right shoulder pain. +Right sided neck pain.    Skin:  Negative for color change, pallor, rash and wound.   Allergic/Immunologic: Negative for immunocompromised state.   Neurological:  Negative for dizziness, syncope, weakness, light-headedness, numbness and headaches.   Hematological:  Negative for adenopathy. Does not bruise/bleed easily.   Psychiatric/Behavioral:  Negative for confusion and decreased concentration.    All other systems reviewed and are negative.          Objective       ED Triage Vitals   Temperature Pulse Blood Pressure Respirations SpO2 Patient Position - Orthostatic VS   05/09/25 1318 05/09/25 1318 05/09/25 1318 05/09/25 1318 05/09/25 1318 05/09/25 1318   98.9 °F (37.2 °C) 72 133/84 16 98 % Sitting      Temp Source Heart Rate Source BP Location FiO2 (%) Pain Score    05/09/25 1318 05/09/25 1318 05/09/25 1318 -- 05/09/25 1426    Temporal Monitor Left arm  7      Vitals      Date and Time Temp  Pulse SpO2 Resp BP Pain Score FACES Pain Rating User   05/09/25 1428 -- -- -- -- -- 7 --    05/09/25 1426 -- -- -- -- -- 7 --    05/09/25 1318 98.9 °F (37.2 °C) 72 98 % 16 133/84 -- -- EP            Physical Exam  Vitals and nursing note reviewed.   Constitutional:       General: He is not in acute distress.     Appearance: Normal appearance. He is well-developed. He is not ill-appearing, toxic-appearing or diaphoretic.   HENT:      Head: Normocephalic and atraumatic.      Right Ear: External ear normal.      Left Ear: External ear normal.      Nose: Nose normal.      Mouth/Throat:      Mouth: Mucous membranes are moist.      Pharynx: Oropharynx is clear.   Eyes:      Extraocular Movements: Extraocular movements intact.      Conjunctiva/sclera: Conjunctivae normal.   Neck:      Vascular: No JVD.      Comments: No reproducible midline cervical spine tenderness.  There is mild right posterior and lateral cervical muscle hypertonicity without tenderness.  Right trapezius muscle hypertonicity.  Patient has full range of motion with extension and flexion of the cervical spine.  Mild decreased range of motion with rightward rotation secondary to pain.  Normal left-sided rotation.  Cardiovascular:      Rate and Rhythm: Normal rate and regular rhythm.      Pulses: Normal pulses.      Heart sounds: Normal heart sounds. No murmur heard.     No friction rub. No gallop.   Pulmonary:      Effort: Pulmonary effort is normal. No respiratory distress.      Breath sounds: Normal breath sounds. No wheezing, rhonchi or rales.   Abdominal:      General: There is no distension.      Palpations: Abdomen is soft.      Tenderness: There is no abdominal tenderness. There is no guarding or rebound.   Musculoskeletal:         General: No tenderness or deformity. Normal range of motion.      Cervical back: Neck supple. No rigidity or tenderness.      Comments: Reproducible tenderness of the right shoulder joint.  Full range of motion of  right shoulder joint however pain elicited with full right shoulder flexion and abduction.   Skin:     General: Skin is warm and dry.      Coloration: Skin is not pale.      Findings: No erythema or rash.   Neurological:      General: No focal deficit present.      Mental Status: He is alert and oriented to person, place, and time.      Sensory: No sensory deficit.      Motor: No weakness.      Comments: 5/5 strength in bilateral upper extremities both proximal and distal muscle groups.  Normal handgrip strength.  No gross motor or sensory deficits in the median, ulnar or radial nerve distribution of the right upper extremity.   Psychiatric:         Mood and Affect: Mood normal.         Behavior: Behavior normal.         Results Reviewed       None            XR shoulder 2+ views RIGHT   Final Interpretation by Alcon Smith MD (05/09 7417)      No acute osseous abnormality.         Computerized Assisted Algorithm (CAA) may have been used to analyze all applicable images.         Workstation performed: [id number]         CT spine cervical without contrast   Final Interpretation by Chris Feliz MD (05/09 1467)      1.  No cervical spine fracture or traumatic malalignment.   2.  No CT evidence of high-grade spinal canal or neural foraminal stenosis            Workstation performed: [id number]             Procedures    ED Medication and Procedure Management   Prior to Admission Medications   Prescriptions Last Dose Informant Patient Reported? Taking?   omeprazole (PriLOSEC) 20 mg delayed release capsule   No No   Sig: TAKE 1 CAPSULE BY MOUTH EVERY DAY IN THE MORNING   omeprazole (PriLOSEC) 40 MG capsule  Self Yes No   Sig: Take 40 mg by mouth daily   Patient not taking: Reported on 5/28/2024      Facility-Administered Medications: None     Patient's Medications   Discharge Prescriptions    CELECOXIB (CELEBREX) 200 MG CAPSULE    Take 1 tablet by mouth every 12-24 hours as needed for mild to moderate pain.  Take  with food and avoid use with other NSAIDs.       Start Date: 5/9/2025  End Date: --       Order Dose: --       Quantity: 30 capsule    Refills: 0    CYCLOBENZAPRINE (FLEXERIL) 10 MG TABLET    Take 1 tablet (10 mg total) by mouth 2 (two) times a day as needed for muscle spasms       Start Date: 5/9/2025  End Date: --       Order Dose: 10 mg       Quantity: 10 tablet    Refills: 0    METHYLPREDNISOLONE 4 MG TABLET THERAPY PACK    Use as directed on package       Start Date: 5/9/2025  End Date: --       Order Dose: --       Quantity: 21 tablet    Refills: 0       ED SEPSIS DOCUMENTATION   Time reflects when diagnosis was documented in both MDM as applicable and the Disposition within this note       Time User Action Codes Description Comment    5/9/2025  2:55 PM Kary Goldstein [M25.511] Right shoulder pain     5/9/2025  2:55 PM Kary Goldstein [M54.2] Neck pain on right side     5/9/2025  2:55 PM Kary Goldstein [S46.811A] Strain of trapezius muscle, right, initial encounter     5/9/2025  2:55 PM Kary Goldstein [M54.12] Right cervical radiculopathy                  Kary Goldstein DO  05/09/25 1455

## 2025-05-27 ENCOUNTER — OFFICE VISIT (OUTPATIENT)
Dept: OBGYN CLINIC | Facility: CLINIC | Age: 50
End: 2025-05-27
Payer: COMMERCIAL

## 2025-05-27 VITALS
BODY MASS INDEX: 32.23 KG/M2 | HEIGHT: 72 IN | OXYGEN SATURATION: 99 % | HEART RATE: 78 BPM | TEMPERATURE: 97.9 F | WEIGHT: 238 LBS

## 2025-05-27 DIAGNOSIS — M54.2 CERVICALGIA: Primary | ICD-10-CM

## 2025-05-27 PROCEDURE — 99203 OFFICE O/P NEW LOW 30 MIN: CPT | Performed by: FAMILY MEDICINE

## 2025-05-27 NOTE — PROGRESS NOTES
[address] [place]  [facility] [address]  [phone]      Assessment:  1. Cervicalgia  -     Ambulatory Referral to Physical Therapy; Future    Assessment & Plan  Cervicalgia    Orders:    Ambulatory Referral to Physical Therapy; Future      Patient Instructions   F/u 6 wks  Begin physical therapy  Home exercises.  Icing/heat/OTC pain meds as needed.    Plan:  Patient Instructions   F/u 6 wks  Begin physical therapy  Home exercises.  Icing/heat/OTC pain meds as needed.     Return in about 6 weeks (around 7/8/2025).    Chief Complaint:  Chief Complaint   Patient presents with    Neck - Pain     Right side       Subjective:   HPI    Patient ID: Ash Cooper is a 49 y.o. male     Here c/o neck pain  Seen in ER.  Note reviewed.  Woke up about 4 wks ago with shoulder stiffness  2 wks later with shoulder/neck stiffness  R shoulder pain.  Throbbing/sharp pain  Radiate toward the shoulder  Denies n/t  Finished steroids- helped a little  Taking celebrex/flexeril as needed- helps a little  Getting better- less pain      CT CERVICAL SPINE - WITHOUT CONTRAST     INDICATION:   right cervical radiculopathy.     COMPARISON: Cervical spine MRI 5/15/2019.     TECHNIQUE:  CT examination of the cervical spine was performed without intravenous contrast.  Contiguous axial images were obtained. Multiplanar 2D reformatted images were created from the source data.     Radiation dose length product (DLP) for this visit:  424 mGy-cm. .  This examination, like all CT scans performed in the [facility], was performed utilizing techniques to minimize radiation dose exposure, including the use of iterative   reconstruction and automated exposure control.     IMAGE QUALITY:  Diagnostic.     FINDINGS:     ALIGNMENT: Straightening of normal cervical lordosis. No subluxation.     VERTEBRAE:  No fracture.     DEGENERATIVE CHANGES:  Spinal canal stenosis: No CT evidence of high-grade spinal  canal stenosis  Neuroforaminal stenosis: Mild bilateral neuroforaminal narrowing at C3-C4.     PREVERTEBRAL AND PARASPINAL SOFT TISSUES: Unremarkable. Patient is edentulous.     THORACIC INLET:  Normal.     IMPRESSION:     1.  No cervical spine fracture or traumatic malalignment.  2.  No CT evidence of high-grade spinal canal or neural foraminal stenosis    Review of Systems   Constitutional:  Negative for fatigue and fever.   Respiratory:  Negative for shortness of breath.    Cardiovascular:  Negative for chest pain.   Gastrointestinal:  Negative for abdominal pain and nausea.   Genitourinary:  Negative for dysuria.   Musculoskeletal:  Positive for arthralgias and neck pain.   Skin:  Negative for rash and wound.   Neurological:  Negative for weakness and headaches.       Objective:  Vitals:  Pulse 78   Temp 97.9 °F (36.6 °C)   Ht 6' (1.829 m)   Wt 108 kg (238 lb)   SpO2 99%   BMI 32.28 kg/m²     The following portions of the patient's history were reviewed and updated as appropriate: allergies, current medications, past family history, past medical history, past social history, past surgical history, and problem list.    Physical exam:  Physical Exam  Constitutional:       Appearance: Normal appearance. He is normal weight.     Eyes:      Extraocular Movements: Extraocular movements intact.     Pulmonary:      Effort: Pulmonary effort is normal.     Musculoskeletal:      Cervical back: Normal range of motion.     Skin:     General: Skin is warm and dry.     Neurological:      General: No focal deficit present.      Mental Status: He is alert and oriented to person, place, and time. Mental status is at baseline.     Psychiatric:         Mood and Affect: Mood normal.         Behavior: Behavior normal.         Thought Content: Thought content normal.         Judgment: Judgment normal.       Back Exam     Tenderness   The patient is experiencing no tenderness.     Range of Motion   The patient has normal back  ROM.            I have personally reviewed pertinent films in PACS and my interpretation is CT  C spine-  loss of lordotic curve. No fx.      Ector Salgado MD

## 2025-06-05 ENCOUNTER — EVALUATION (OUTPATIENT)
Dept: PHYSICAL THERAPY | Age: 50
End: 2025-06-05
Payer: COMMERCIAL

## 2025-06-05 DIAGNOSIS — M54.2 CERVICALGIA: Primary | ICD-10-CM

## 2025-06-05 PROCEDURE — 97140 MANUAL THERAPY 1/> REGIONS: CPT | Performed by: PHYSICAL THERAPIST

## 2025-06-05 PROCEDURE — 97110 THERAPEUTIC EXERCISES: CPT | Performed by: PHYSICAL THERAPIST

## 2025-06-05 PROCEDURE — 97162 PT EVAL MOD COMPLEX 30 MIN: CPT | Performed by: PHYSICAL THERAPIST

## 2025-06-05 NOTE — PROGRESS NOTES
PT Evaluation     Today's date: 2025  Patient name: Ash Cooper  : 1975  MRN: [id number]  Referring provider: Ector Salgado MD  Dx:   Encounter Diagnosis     ICD-10-CM    1. Cervicalgia  M54.2           Start Time: 0700  Stop Time: 0758  Total time in clinic (min): 58 minutes    Assessment  Impairments: abnormal or restricted ROM, activity intolerance, impaired physical strength, lacks appropriate home exercise program, pain with function, poor posture  and poor body mechanics  Symptom irritability: low    Assessment details: Ash Cooper is a 49 y.o. male who presents with pain, decreased strength, and decreased ROM. Due to these impairments, Patient has difficulty performing a/iadls and recreational activities. Patient's clinical presentation is consistent with their referring diagnosis of neck pain. Patient would benefit from skilled physical therapy to address their aforementioned impairments, improve their level of function and to improve their overall quality of life.  Understanding of Dx/Px/POC: excellent     Prognosis: excellent    Goals  ST-3 WEEKS  1.  Decrease pain by 2 points on VAS at its worst.  2.  Increase ROM by > 5 deg in all deficients planes.  3.  Increase neck and RUE strength by 1/2 MMT grade in all deficient planes.    LT-6 WEEKS  1. Patient to be independent with a/iadls.  2. Increase functional activities for leisure and home activities to previous LOF.  3. Independent with HEP and/or fitness program.    Plan  Patient would benefit from: skilled physical therapy  Planned modality interventions: cryotherapy, thermotherapy: hydrocollator packs and unattended electrical stimulation    Planned therapy interventions: activity modification, behavior modification, body mechanics training, flexibility, functional ROM exercises, home exercise program, IADL retraining, joint mobilization, manual therapy, neuromuscular re-education, patient education, postural training,  strengthening, stretching, therapeutic activities and therapeutic exercise    Frequency: 2-3x week.  Duration in weeks: 12  Plan of Care beginning date: 2025  Plan of Care expiration date: 2025  Treatment plan discussed with: patient        Subjective Evaluation    History of Present Illness  Mechanism of injury: Patient reports he started with pain in his neck and then stiffness in his shoulder. He has hx of right RTC tear and thought it was getting worse. He went to ED, had xray and CT scan. He got shot of Toradol in his neck and feels much better. He was referred to [facility]. He only gets pain with lifting and turning his head.           Not a recurrent problem   Patient Goals  Patient goals for therapy: decreased pain, increased motion and increased strength    Pain  Current pain ratin  At worst pain ratin  Location: neck  Quality: sharp and radiating  Relieving factors: change in position and rest  Aggravating factors: overhead activity and lifting (turning head)  Progression: improved    Social Support    Employment status: working (warehouse)  Hand dominance: right      Diagnostic Tests  X-ray: normal  CT scan: normal        Objective     Static Posture     Head  Forward.    Shoulders  Rounded.    Thoracic Spine  Hyperkyphosis.    Postural Observations  Seated posture: fair  Standing posture: fair      Palpation     Right   Hypertonic in the upper trapezius.   Tenderness of the upper trapezius.     Neurological Testing     Sensation   Cervical/Thoracic   Left   Intact: light touch    Right   Intact: light touch    Additional Neurological Details  No c/o radicular symptoms in Ue's and no neuro signs noted.    Active Range of Motion   Cervical/Thoracic Spine       Cervical    Flexion: 32 degrees   Extension: 35 degrees      Left lateral flexion: 15 degrees      Right lateral flexion: 35 degrees      Left rotation: 45 degrees  Right rotation: 52 degrees       Joint Play     Hypomobile: C5, C6 and C7  "    Strength/Myotome Testing   Cervical Spine   Neck extension: 4  Neck flexion: 4+    Left   Neck lateral flexion (C3): 4    Right   Neck lateral flexion (C3): 4    Left Shoulder   Normal muscle strength    Right Shoulder     Planes of Motion   Flexion: 4   Right shoulder extension strength: pain.   Abduction: 4+   External rotation at 0°: 4+   Internal rotation at 0°: 4     Isolated Muscles   Biceps: 5   Serratus anterior: 3+   Supraspinatus: 4+   Triceps: 5     Left Wrist/Hand      (2nd hand position)     Trial 1: 40    Trial 2: 40    Trial 3: 40    Average: 40    Right Wrist/Hand      (2nd hand position)     Trial 1: 50    Trial 2: 40    Trial 3: 30    Average: 40    Tests   Cervical   Positive neck flexor muscle endurance test.  Negative vertical compression, cervical distraction, alar ligament test and Sharp-Ephraim test.     Left   Negative Spurling's Test A.     Right   Negative Spurling's Test A.     Right Shoulder   Positive lift-off.   Negative belly press, drop arm, empty can and full can.     Lumbar   Negative vertical compression.              POC expires Unit limit Auth Expiration date PT/OT + Visit Limit?   9/5/ na na 45                           Visit/Unit Tracking  AUTH Status:  Date 6/5              Auth after visit 12 Used 1               Remaining  11               FOTO                 Precautions: R RTC tear, B knee scopes, HTN, GERD,   Daily Treatment Diary:    Manuals 6/5            Neck stm SG                                                   Neuro Re-Ed                                                                                                        Ther Ex             Chin tucks 20x 5\"            snags nv            Neckslevel AROM 2'            Pulleys w/rot to up 30x                                                                Ther Activity                                       Gait Training                                       Modalities                                    "

## 2025-06-12 ENCOUNTER — APPOINTMENT (OUTPATIENT)
Dept: PHYSICAL THERAPY | Age: 50
End: 2025-06-12
Attending: FAMILY MEDICINE
Payer: COMMERCIAL

## 2025-06-16 ENCOUNTER — OFFICE VISIT (OUTPATIENT)
Dept: PHYSICAL THERAPY | Age: 50
End: 2025-06-16
Attending: FAMILY MEDICINE
Payer: COMMERCIAL

## 2025-06-16 DIAGNOSIS — M54.2 CERVICALGIA: Primary | ICD-10-CM

## 2025-06-16 PROCEDURE — 97140 MANUAL THERAPY 1/> REGIONS: CPT | Performed by: PHYSICAL THERAPIST

## 2025-06-16 PROCEDURE — 97110 THERAPEUTIC EXERCISES: CPT | Performed by: PHYSICAL THERAPIST

## 2025-06-16 NOTE — PROGRESS NOTES
"Daily Note     Today's date: 2025  Patient name: Ash Cooper  : 1975  MRN: [id number]  Referring provider: Ector Salgado MD  Dx:   Encounter Diagnosis     ICD-10-CM    1. Cervicalgia  M54.2           Start Time: 0700  Stop Time: 0745  Total time in clinic (min): 45 minutes    Subjective: Patient c/o tightness in c/s even at rest.       Objective: See treatment diary below      Assessment: Tolerated treatment well. Patient would benefit from continued PT. Patient was instructed in HEP with copy given and in chart.       Plan: Continue per plan of care.        POC expires Unit limit Auth Expiration date PT/OT + Visit Limit?   / na na 45                           Visit/Unit Tracking  AUTH Status:  Date              Auth after visit 12 Used 1 2              Remaining  11 10              FOTO                 Precautions: R RTC tear, B knee scopes, HTN, GERD,   Daily Treatment Diary:    Manuals            Neck stm SG SG                                                  Neuro Re-Ed                                                                                                        Ther Ex             Chin tucks 20x 5\" 30x 5\"           DNF  30x 5\"           snags nv 3'           Neckslevel AROM 2' 2' Yel           Pulleys w/rot to up 30x 30x           UBE  2/2                                     HEP  5'           Ther Activity                                       Gait Training                                       Modalities                                       Access Code: [email or web address]  URL: [email or web address]  Date: 2025  Prepared by: Noris Gimenez    Exercises  - Seated Assisted Cervical Rotation with Towel  - 1 x daily - 7 x weekly - 3 sets - 10 reps  - Standing Cervical Retraction  - 1 x daily - 7 x weekly - 3 sets - 10 reps  - Supine Chin Tuck  - 1 x daily - 7 x weekly - 3 sets - 10 reps  - Eccentric Deep Neck Flexor Training  - 1 x daily - 7 x weekly - 3 sets " - 10 reps  - Seated Deep Neck Flexor Nods  - 1 x daily - 7 x weekly - 3 sets - 10 reps

## 2025-06-18 ENCOUNTER — APPOINTMENT (OUTPATIENT)
Dept: RADIOLOGY | Facility: CLINIC | Age: 50
End: 2025-06-18
Attending: ORTHOPAEDIC SURGERY
Payer: COMMERCIAL

## 2025-06-18 ENCOUNTER — OFFICE VISIT (OUTPATIENT)
Dept: OBGYN CLINIC | Facility: CLINIC | Age: 50
End: 2025-06-18
Payer: COMMERCIAL

## 2025-06-18 VITALS — WEIGHT: 243 LBS | BODY MASS INDEX: 32.91 KG/M2 | HEIGHT: 72 IN

## 2025-06-18 DIAGNOSIS — M17.12 PRIMARY OSTEOARTHRITIS OF LEFT KNEE: ICD-10-CM

## 2025-06-18 DIAGNOSIS — M25.562 LEFT KNEE PAIN, UNSPECIFIED CHRONICITY: ICD-10-CM

## 2025-06-18 DIAGNOSIS — M25.562 ACUTE PAIN OF LEFT KNEE: Primary | ICD-10-CM

## 2025-06-18 PROCEDURE — 99213 OFFICE O/P EST LOW 20 MIN: CPT | Performed by: ORTHOPAEDIC SURGERY

## 2025-06-18 PROCEDURE — 73564 X-RAY EXAM KNEE 4 OR MORE: CPT

## 2025-06-18 PROCEDURE — 20610 DRAIN/INJ JOINT/BURSA W/O US: CPT | Performed by: ORTHOPAEDIC SURGERY

## 2025-06-18 RX ORDER — BUPIVACAINE HYDROCHLORIDE 2.5 MG/ML
2 INJECTION, SOLUTION INFILTRATION; PERINEURAL
Status: COMPLETED | OUTPATIENT
Start: 2025-06-18 | End: 2025-06-18

## 2025-06-18 RX ORDER — METHYLPREDNISOLONE ACETATE 40 MG/ML
2 INJECTION, SUSPENSION INTRA-ARTICULAR; INTRALESIONAL; INTRAMUSCULAR; SOFT TISSUE
Status: COMPLETED | OUTPATIENT
Start: 2025-06-18 | End: 2025-06-18

## 2025-06-18 RX ORDER — LIDOCAINE HYDROCHLORIDE 10 MG/ML
2 INJECTION, SOLUTION INFILTRATION; PERINEURAL
Status: COMPLETED | OUTPATIENT
Start: 2025-06-18 | End: 2025-06-18

## 2025-06-18 RX ADMIN — METHYLPREDNISOLONE ACETATE 2 ML: 40 INJECTION, SUSPENSION INTRA-ARTICULAR; INTRALESIONAL; INTRAMUSCULAR; SOFT TISSUE at 14:45

## 2025-06-18 RX ADMIN — BUPIVACAINE HYDROCHLORIDE 2 ML: 2.5 INJECTION, SOLUTION INFILTRATION; PERINEURAL at 14:45

## 2025-06-18 RX ADMIN — LIDOCAINE HYDROCHLORIDE 2 ML: 10 INJECTION, SOLUTION INFILTRATION; PERINEURAL at 14:45

## 2025-06-18 NOTE — PROGRESS NOTES
Name: Ash Cooper      : 1975      MRN: [id number]  Encounter Provider: Nigel Sky DO  Encounter Date: 2025   Encounter department: [facility] [place]  :  Assessment & Plan  Acute pain of left knee    Orders:    XR knee 4+ vw left injury; Future    Primary osteoarthritis of left knee    Orders:    Large joint arthrocentesis: L knee          Left knee osteoarthritis  Imaging reviewed at appointment completion  Educated patient about recommended nonoperative management of Left knee osteoarthritis including therapy, CSI, visco and PRP injections, and OTC topical and oral analgesics.   Patient agreeable and wished to proceed with CSI. Risks discussed. Tolerated well. Would not perform earlier than 3 months and would avoid multiple CSI secondary to patient's age and concern for decrease cartilage quality. Patient agreeable.  Continue OTC medications and strengthening of Left lower extremity  Follow up October before his half marathon       History of the Present Illness   History of Present Illness   HPI   Ash Cooper is a 49 y.o. male with Left knee pain. Patient approximately 2 years s/p Left knee partial medial menisectomy, chondroplasty and medial femoral condyle. Patient reports he is doing well s/p surgical intervention with residual anterior knee discomfort. He has pain mostly with stair ambulation. Denies recent swelling or locking.  He has been running for training for a half marathon coming up in November.        Review of Systems     Review of Systems   Constitutional:  Negative for chills and fever.   HENT:  Negative for ear pain and sore throat.    Eyes:  Negative for pain and visual disturbance.   Respiratory:  Negative for cough and shortness of breath.    Cardiovascular:  Negative for chest pain and palpitations.   Gastrointestinal:  Negative for abdominal pain and vomiting.   Genitourinary:  Negative for dysuria and hematuria.   Musculoskeletal:   Negative for arthralgias and back pain.   Skin:  Negative for color change and rash.   Neurological:  Negative for seizures and syncope.   All other systems reviewed and are negative.      Physical Exam   Objective   Ht 6' (1.829 m)   Wt 110 kg (243 lb)   BMI 32.96 kg/m²        Left Knee  Range of motion from 0 to 125 degrees with mild anterior knee discomfort at terminal flexion.    There is no effusion.    There is tenderness over the medial femoral condyle, medial joint line.    The patient is able to perform a straight leg raise.    Varus stress testing reveals no pain or instability at 0 and 30 degrees   Valgus stress testing reveals no pain or instability at 0 and 30 degrees  The patient is neurovascular intact distally.      Data Review     I have personally reviewed pertinent films in PACS, and my interpretation follows.    X-rays taken 06/18/2025 of Left knee independently reviewed and demonstrate mild medial and patellofemoral degenerative changes with subtle joint space narrowing and osteophyte formation without acute fracture or dislocation. Mildly progressed changes from previous imaging.     Lab Results   Component Value Date/Time    HGBA1C 5.8 (H) 08/02/2024 08:52 AM       Social History[1]        Large joint arthrocentesis: L knee    Performed by: Nigel Sky DO  Authorized by: Nigel Sky DO    Universal Protocol:  Risks and benefits: risks, benefits and alternatives were discussed  Consent given by: patient  Patient identity confirmed: verbally with patient    Is this a Visco injection? NoProcedure Details  Location: knee - L knee  Needle size: 22 G  Approach: lateral  Medications administered: 2 mL bupivacaine 0.25 %; 2 mL lidocaine 1 %; 2 mL methylPREDNISolone acetate 40 mg/mL    Patient tolerance: patient tolerated the procedure well with no immediate complications  Dressing:  Sterile dressing applied            Nigel Sky DO          [1]   Social History  Tobacco Use     Smoking status: Former     Current packs/day: 0.00     Average packs/day: 1 pack/day for 26.0 years (26.0 ttl pk-yrs)     Types: Cigarettes     Start date:      Quit date:      Years since quittin.4    Smokeless tobacco: Never   Vaping Use    Vaping status: Never Used   Substance Use Topics    Alcohol use: Yes     Comment: 3-4 x year    Drug use: No

## 2025-06-19 ENCOUNTER — OFFICE VISIT (OUTPATIENT)
Dept: PHYSICAL THERAPY | Age: 50
End: 2025-06-19
Attending: FAMILY MEDICINE
Payer: COMMERCIAL

## 2025-06-19 DIAGNOSIS — M54.2 CERVICALGIA: Primary | ICD-10-CM

## 2025-06-19 PROCEDURE — 97140 MANUAL THERAPY 1/> REGIONS: CPT | Performed by: PHYSICAL THERAPIST

## 2025-06-19 PROCEDURE — 97110 THERAPEUTIC EXERCISES: CPT | Performed by: PHYSICAL THERAPIST

## 2025-06-19 NOTE — PROGRESS NOTES
"Daily Note     Today's date: 2025  Patient name: Ash Cooper  : 1975  MRN: [id number]  Referring provider: Ector Salgado MD  Dx:   Encounter Diagnosis     ICD-10-CM    1. Cervicalgia  M54.2           Start Time: 0700  Stop Time: 0745  Total time in clinic (min): 45 minutes    Subjective: Patient reports his neck is feeling better.       Objective: See treatment diary below      Assessment: Tolerated treatment well. Patient would benefit from continued PT. Reviewed HEP and copy of snags given. Patient less tight in bilateral levator and neck.       Plan: Continue per plan of care.        POC expires Unit limit Auth Expiration date PT/OT + Visit Limit?   / na na 45                           Visit/Unit Tracking  AUTH Status:  Date             Auth after visit 12 Used 1 2 3             Remaining  11 10 9             FOTO                 Precautions: R RTC tear, B knee scopes, HTN, GERD,   Daily Treatment Diary:    Manuals           Neck stm SG SG SG                                                 Neuro Re-Ed                                                                                                        Ther Ex             Chin tucks 20x 5\" 30x 5\" 20x          DNF  30x 5\" 20x          snags nv 3' 2'          Neckslevel AROM 2' 2' Yel Red 2'          Pulleys w/rot to up 30x 30x 30x          UBE  2/2 2/2          TB rows, ext  Blk 30x ea Blk 30x ea           TB high rows   30x blk          HEP  5'           Ther Activity                                       Gait Training                                       Modalities                                       Access Code: [email or web address]  URL: [email or web address]  Date: 2025  Prepared by: Noris Gimenez    Exercises  - Seated Assisted Cervical Rotation with Towel  - 1 x daily - 7 x weekly - 3 sets - 10 reps  - Standing Cervical Retraction  - 1 x daily - 7 x weekly - 3 sets - 10 reps  - Supine Chin Tuck  - 1 x " daily - 7 x weekly - 3 sets - 10 reps  - Eccentric Deep Neck Flexor Training  - 1 x daily - 7 x weekly - 3 sets - 10 reps  - Seated Deep Neck Flexor Nods  - 1 x daily - 7 x weekly - 3 sets - 10 reps

## 2025-06-23 ENCOUNTER — OFFICE VISIT (OUTPATIENT)
Dept: PHYSICAL THERAPY | Age: 50
End: 2025-06-23
Attending: FAMILY MEDICINE
Payer: COMMERCIAL

## 2025-06-23 DIAGNOSIS — M54.2 CERVICALGIA: Primary | ICD-10-CM

## 2025-06-23 PROCEDURE — 97110 THERAPEUTIC EXERCISES: CPT | Performed by: PHYSICAL THERAPIST

## 2025-06-23 PROCEDURE — 97140 MANUAL THERAPY 1/> REGIONS: CPT | Performed by: PHYSICAL THERAPIST

## 2025-06-23 NOTE — PROGRESS NOTES
"Daily Note     Today's date: 2025  Patient name: Ash Cooper  : 1975  MRN: [id number]  Referring provider: Ector Salgado MD  Dx:   Encounter Diagnosis     ICD-10-CM    1. Cervicalgia  M54.2           Start Time: 702  Stop Time: 747  Total time in clinic (min): 45 minutes    Subjective: Patient reports his neck is feeling pretty good today and not bad this weekend.       Objective: See treatment diary below      Assessment: Tolerated treatment well. Patient would benefit from continued PT. Patient making good gains in PT. Small lump right neck noted. Possible lymph node.       Plan: Continue per plan of care.        POC expires Unit limit Auth Expiration date PT/OT + Visit Limit?   /  na 45                           Visit/Unit Tracking  AUTH Status:  Date            Auth after visit 12 Used 1 2 3 4            Remaining  11 10 9 8            FOTO                 Precautions: R RTC tear, B knee scopes, HTN, GERD,   Daily Treatment Diary:    Manuals          Neck stm SG SG SG SG                                                Neuro Re-Ed                                       Ther Ex                                                                 Scap retraction on bolster    30x         Chin tucks 20x 5\" 30x 5\" 20x 20x         DNF  30x 5\" 20x 20x         snags nv 3' 2' 2'         Neckslevel AROM 2' 2' Yel Red 2' 2' red         Pulleys w/rot to up 30x 30x 30x 30x         UBE  2/2 2/2 3/3         TB rows, ext  Blk 30x ea Blk 30x ea  30x ea         TB high rows   30x blk 30x         HEP  5'           Ther Activity                                       Gait Training                                       Modalities                                       Access Code: [email or web address]  URL: [email or web address]  Date: 2025  Prepared by: Noris Gimenez    Exercises  - Seated Assisted Cervical Rotation with Towel  - 1 x daily - 7 x weekly - 3 sets - 10 reps  - " Standing Cervical Retraction  - 1 x daily - 7 x weekly - 3 sets - 10 reps  - Supine Chin Tuck  - 1 x daily - 7 x weekly - 3 sets - 10 reps  - Eccentric Deep Neck Flexor Training  - 1 x daily - 7 x weekly - 3 sets - 10 reps  - Seated Deep Neck Flexor Nods  - 1 x daily - 7 x weekly - 3 sets - 10 reps

## 2025-06-26 ENCOUNTER — OFFICE VISIT (OUTPATIENT)
Dept: PHYSICAL THERAPY | Age: 50
End: 2025-06-26
Attending: FAMILY MEDICINE
Payer: COMMERCIAL

## 2025-06-26 DIAGNOSIS — M54.2 CERVICALGIA: Primary | ICD-10-CM

## 2025-06-26 PROCEDURE — 97110 THERAPEUTIC EXERCISES: CPT | Performed by: PHYSICAL THERAPIST

## 2025-06-26 NOTE — PROGRESS NOTES
"Daily Note     Today's date: 2025  Patient name: Ash Cooper  : 1975  MRN: [id number]  Referring provider: Ector Salgado MD  Dx:   Encounter Diagnosis     ICD-10-CM    1. Cervicalgia  M54.2           Start Time: 0710  Stop Time: 0800  Total time in clinic (min): 50 minutes    Subjective: Patient reports his neck is feeling better.       Objective: See treatment diary below      Assessment: Tolerated treatment well. Patient would benefit from continued PT. Less tenderness and less pain/stiffness with rotations.       Plan: Continue per plan of care.        POC expires Unit limit Auth Expiration date PT/OT + Visit Limit?   / na na 45                           Visit/Unit Tracking  AUTH Status:  Date           Auth after visit 12 Used 1 2 3 4 5           Remaining  11 10 9 8 7           FOTO     nv            Precautions: R RTC tear, B knee scopes, HTN, GERD,   Daily Treatment Diary:    Manuals         Neck stm SG SG SG SG SG                                               Neuro Re-Ed                                       Ther Ex                                                                 Scap retraction on bolster    30x 30x         Chin tucks 20x 5\" 30x 5\" 20x 20x 20x        DNF  30x 5\" 20x 20x 20x        snags nv 3' 2' 2' 2'        Neckslevel AROM 2' 2' Yel Red 2' 2' red 4' red        Pulleys w/rot to up 30x 30x 30x 30x 30x        UBE  2/2 2/2 3/3 3/3 L2        rows     45# 30x        TB  ext  Blk 30x ea Blk 30x ea  30x ea 30x         TB high rows   30x blk 30x 30x        HEP  5'           Ther Activity                                       Gait Training                                       Modalities                                       Access Code: [email or web address]  URL: [email or web address]  Date: 2025  Prepared by: Noris Gimenez    Exercises  - Seated Assisted Cervical Rotation with Towel  - 1 x daily - 7 x weekly - 3 sets - 10 " reps  - Standing Cervical Retraction  - 1 x daily - 7 x weekly - 3 sets - 10 reps  - Supine Chin Tuck  - 1 x daily - 7 x weekly - 3 sets - 10 reps  - Eccentric Deep Neck Flexor Training  - 1 x daily - 7 x weekly - 3 sets - 10 reps  - Seated Deep Neck Flexor Nods  - 1 x daily - 7 x weekly - 3 sets - 10 reps

## 2025-06-30 ENCOUNTER — OFFICE VISIT (OUTPATIENT)
Dept: PHYSICAL THERAPY | Age: 50
End: 2025-06-30
Attending: FAMILY MEDICINE
Payer: COMMERCIAL

## 2025-06-30 DIAGNOSIS — M54.2 CERVICALGIA: Primary | ICD-10-CM

## 2025-06-30 PROCEDURE — 97110 THERAPEUTIC EXERCISES: CPT | Performed by: PHYSICAL THERAPIST

## 2025-06-30 NOTE — PROGRESS NOTES
"Daily Note     Today's date: 2025  Patient name: Ash Cooper  : 1975  MRN: [id number]  Referring provider: Ector Salgado MD  Dx:   Encounter Diagnosis     ICD-10-CM    1. Cervicalgia  M54.2           Start Time: 0700  Stop Time: 0750  Total time in clinic (min): 50 minutes    Subjective: Patient reports his neck is doing well. Did some car maintence this weekend.       Objective: See treatment diary below      Assessment: Tolerated treatment well. Patient would benefit from continued PT. Progress to discharge.       Plan: Continue per plan of care.        POC expires Unit limit Auth Expiration date PT/OT + Visit Limit?   /  na 45                           Visit/Unit Tracking  AUTH Status:  Date          Auth after visit 12 Used 1 2 3 4 5 6          Remaining  11 10 9 8 7 6          FOTO     nv            Precautions: R RTC tear, B knee scopes, HTN, GERD,   Daily Treatment Diary:    Manuals        Neck stm SG SG SG SG SG SG                                              Neuro Re-Ed                                       Ther Ex                                                                 Scap retraction on bolster    30x 30x  30x       Chin tucks 20x 5\" 30x 5\" 20x 20x 20x 20x       DNF  30x 5\" 20x 20x 20x 20x       snags nv 3' 2' 2' 2' home       Neckslevel AROM 2' 2' Yel Red 2' 2' red 4' red 4' red       Pulleys w/rot to up 30x 30x 30x 30x 30x 30x       UBE  2/2 2/2 3/3 3/3 L2 3/3 L2       rows     45# 30x 45# 30x       TB  ext  Blk 30x ea Blk 30x ea  30x ea 30x  30x       TB high rows   30x blk 30x 30x 30x       HEP  5'           Ther Activity                                       Gait Training                                       Modalities                                       Access Code: [email or web address]  URL: [email or web address]  Date: 2025  Prepared by: Noris Gimenez    Exercises  - Seated Assisted Cervical Rotation " with Towel  - 1 x daily - 7 x weekly - 3 sets - 10 reps  - Standing Cervical Retraction  - 1 x daily - 7 x weekly - 3 sets - 10 reps  - Supine Chin Tuck  - 1 x daily - 7 x weekly - 3 sets - 10 reps  - Eccentric Deep Neck Flexor Training  - 1 x daily - 7 x weekly - 3 sets - 10 reps  - Seated Deep Neck Flexor Nods  - 1 x daily - 7 x weekly - 3 sets - 10 reps

## (undated) DEVICE — TUBING SUCTION 5MM X 12 FT

## (undated) DEVICE — IMPERVIOUS STOCKINETTE: Brand: DEROYAL

## (undated) DEVICE — BLADE SHAVER TORPEDO CRV 4MM 13MM COOLCUT

## (undated) DEVICE — INTENDED FOR TISSUE SEPARATION, AND OTHER PROCEDURES THAT REQUIRE A SHARP SURGICAL BLADE TO PUNCTURE OR CUT.: Brand: BARD-PARKER ® CARBON RIB-BACK BLADES

## (undated) DEVICE — OCCLUSIVE GAUZE STRIP,3% BISMUTH TRIBROMOPHENATE IN PETROLATUM BLEND: Brand: XEROFORM

## (undated) DEVICE — ACE WRAP 6 IN STERILE

## (undated) DEVICE — DRAPE EQUIPMENT RF WAND

## (undated) DEVICE — LIGHT GLOVE GREEN

## (undated) DEVICE — ABDOMINAL PAD: Brand: DERMACEA

## (undated) DEVICE — PROBE ABLATION  APOLLO RF 90 DEG MULTI PORT

## (undated) DEVICE — LIGHT HANDLE COVER SLEEVE DISP BLUE STELLAR

## (undated) DEVICE — SUT ETHILON 3-0 PS-1 18 IN 1663H

## (undated) DEVICE — BETHLEHEM UNIVERSAL  ARTHRO PK: Brand: CARDINAL HEALTH

## (undated) DEVICE — GLOVE INDICATOR PI UNDERGLOVE SZ 7.5 BLUE

## (undated) DEVICE — SCD SEQUENTIAL COMPRESSION COMFORT SLEEVE MEDIUM KNEE LENGTH: Brand: KENDALL SCD

## (undated) DEVICE — TUBING ARTHROSCOPIC WAVE  MAIN PUMP

## (undated) DEVICE — GLOVE SRG BIOGEL 7.5

## (undated) DEVICE — BLADE SHAVER DISSECTOR 4MM 13CM COOLCUT

## (undated) DEVICE — CUFF TOURNIQUET 34 X 4 IN QUICK CONNECT DISP 1BLA

## (undated) DEVICE — PADDING CAST 6IN COTTON STRL

## (undated) DEVICE — PADDING CAST 4 IN  COTTON STRL

## (undated) DEVICE — CHLORAPREP HI-LITE 26ML ORANGE